# Patient Record
Sex: MALE | Race: WHITE | NOT HISPANIC OR LATINO | Employment: FULL TIME | URBAN - METROPOLITAN AREA
[De-identification: names, ages, dates, MRNs, and addresses within clinical notes are randomized per-mention and may not be internally consistent; named-entity substitution may affect disease eponyms.]

---

## 2017-01-03 ENCOUNTER — APPOINTMENT (OUTPATIENT)
Dept: URGENT CARE | Age: 42
End: 2017-01-03
Payer: COMMERCIAL

## 2017-01-03 PROCEDURE — G0382 LEV 3 HOSP TYPE B ED VISIT: HCPCS | Performed by: FAMILY MEDICINE

## 2017-01-03 PROCEDURE — 99283 EMERGENCY DEPT VISIT LOW MDM: CPT | Performed by: FAMILY MEDICINE

## 2017-03-13 ENCOUNTER — APPOINTMENT (OUTPATIENT)
Dept: LAB | Facility: HOSPITAL | Age: 42
End: 2017-03-13
Attending: INTERNAL MEDICINE
Payer: COMMERCIAL

## 2017-03-13 ENCOUNTER — TRANSCRIBE ORDERS (OUTPATIENT)
Dept: LAB | Facility: HOSPITAL | Age: 42
End: 2017-03-13

## 2017-03-13 DIAGNOSIS — C92.40 ACUTE PROMYELOCYTIC LEUKEMIA NOT HAVING ACHIEVED REMISSION (HCC): ICD-10-CM

## 2017-03-13 LAB
ALBUMIN SERPL BCP-MCNC: 3.8 G/DL (ref 3.5–5)
ALP SERPL-CCNC: 98 U/L (ref 46–116)
ALT SERPL W P-5'-P-CCNC: 42 U/L (ref 12–78)
ANION GAP SERPL CALCULATED.3IONS-SCNC: 8 MMOL/L (ref 4–13)
AST SERPL W P-5'-P-CCNC: 18 U/L (ref 5–45)
BASOPHILS # BLD AUTO: 0.02 THOUSANDS/ΜL (ref 0–0.1)
BASOPHILS NFR BLD AUTO: 0 % (ref 0–1)
BILIRUB SERPL-MCNC: 0.72 MG/DL (ref 0.2–1)
BUN SERPL-MCNC: 18 MG/DL (ref 5–25)
CALCIUM SERPL-MCNC: 8.6 MG/DL (ref 8.3–10.1)
CHLORIDE SERPL-SCNC: 105 MMOL/L (ref 100–108)
CO2 SERPL-SCNC: 27 MMOL/L (ref 21–32)
CREAT SERPL-MCNC: 0.89 MG/DL (ref 0.6–1.3)
EOSINOPHIL # BLD AUTO: 0.26 THOUSAND/ΜL (ref 0–0.61)
EOSINOPHIL NFR BLD AUTO: 4 % (ref 0–6)
ERYTHROCYTE [DISTWIDTH] IN BLOOD BY AUTOMATED COUNT: 12.3 % (ref 11.6–15.1)
GFR SERPL CREATININE-BSD FRML MDRD: >60 ML/MIN/1.73SQ M
GLUCOSE SERPL-MCNC: 108 MG/DL (ref 65–140)
HCT VFR BLD AUTO: 44.1 % (ref 36.5–49.3)
HGB BLD-MCNC: 15.3 G/DL (ref 12–17)
LYMPHOCYTES # BLD AUTO: 1.36 THOUSANDS/ΜL (ref 0.6–4.47)
LYMPHOCYTES NFR BLD AUTO: 22 % (ref 14–44)
MCH RBC QN AUTO: 31.8 PG (ref 26.8–34.3)
MCHC RBC AUTO-ENTMCNC: 34.7 G/DL (ref 31.4–37.4)
MCV RBC AUTO: 92 FL (ref 82–98)
MONOCYTES # BLD AUTO: 0.57 THOUSAND/ΜL (ref 0.17–1.22)
MONOCYTES NFR BLD AUTO: 9 % (ref 4–12)
NEUTROPHILS # BLD AUTO: 3.97 THOUSANDS/ΜL (ref 1.85–7.62)
NEUTS SEG NFR BLD AUTO: 65 % (ref 43–75)
NRBC BLD AUTO-RTO: 0 /100 WBCS
PLATELET # BLD AUTO: 216 THOUSANDS/UL (ref 149–390)
PMV BLD AUTO: 11 FL (ref 8.9–12.7)
POTASSIUM SERPL-SCNC: 3.2 MMOL/L (ref 3.5–5.3)
PROT SERPL-MCNC: 7.1 G/DL (ref 6.4–8.2)
RBC # BLD AUTO: 4.81 MILLION/UL (ref 3.88–5.62)
SODIUM SERPL-SCNC: 140 MMOL/L (ref 136–145)
WBC # BLD AUTO: 6.2 THOUSAND/UL (ref 4.31–10.16)

## 2017-03-13 PROCEDURE — 80053 COMPREHEN METABOLIC PANEL: CPT

## 2017-03-13 PROCEDURE — 85025 COMPLETE CBC W/AUTO DIFF WBC: CPT

## 2017-03-13 PROCEDURE — 81315 PML/RARALPHA COM BREAKPOINTS: CPT

## 2017-03-13 PROCEDURE — 36415 COLL VENOUS BLD VENIPUNCTURE: CPT

## 2017-03-23 ENCOUNTER — ALLSCRIPTS OFFICE VISIT (OUTPATIENT)
Dept: OTHER | Facility: OTHER | Age: 42
End: 2017-03-23

## 2017-03-24 LAB — MISCELLANEOUS LAB TEST RESULT: NORMAL

## 2017-05-17 ENCOUNTER — OFFICE VISIT (OUTPATIENT)
Dept: URGENT CARE | Age: 42
End: 2017-05-17
Payer: COMMERCIAL

## 2017-05-17 PROCEDURE — G0382 LEV 3 HOSP TYPE B ED VISIT: HCPCS | Performed by: FAMILY MEDICINE

## 2017-05-17 PROCEDURE — 99283 EMERGENCY DEPT VISIT LOW MDM: CPT | Performed by: FAMILY MEDICINE

## 2017-06-12 ENCOUNTER — TRANSCRIBE ORDERS (OUTPATIENT)
Dept: LAB | Facility: HOSPITAL | Age: 42
End: 2017-06-12

## 2017-06-12 ENCOUNTER — APPOINTMENT (OUTPATIENT)
Dept: LAB | Facility: HOSPITAL | Age: 42
End: 2017-06-12
Attending: INTERNAL MEDICINE
Payer: COMMERCIAL

## 2017-06-12 DIAGNOSIS — C92.40 ACUTE PROMYELOCYTIC LEUKEMIA NOT HAVING ACHIEVED REMISSION (HCC): Primary | ICD-10-CM

## 2017-06-12 DIAGNOSIS — C92.40 ACUTE PROMYELOCYTIC LEUKEMIA NOT HAVING ACHIEVED REMISSION (HCC): ICD-10-CM

## 2017-06-12 LAB
ALBUMIN SERPL BCP-MCNC: 3.9 G/DL (ref 3.5–5)
ALP SERPL-CCNC: 79 U/L (ref 46–116)
ALT SERPL W P-5'-P-CCNC: 38 U/L (ref 12–78)
ANION GAP SERPL CALCULATED.3IONS-SCNC: 7 MMOL/L (ref 4–13)
AST SERPL W P-5'-P-CCNC: 17 U/L (ref 5–45)
BASOPHILS # BLD AUTO: 0.03 THOUSANDS/ΜL (ref 0–0.1)
BASOPHILS NFR BLD AUTO: 0 % (ref 0–1)
BILIRUB SERPL-MCNC: 0.72 MG/DL (ref 0.2–1)
BUN SERPL-MCNC: 25 MG/DL (ref 5–25)
CALCIUM SERPL-MCNC: 8.5 MG/DL (ref 8.3–10.1)
CHLORIDE SERPL-SCNC: 105 MMOL/L (ref 100–108)
CO2 SERPL-SCNC: 31 MMOL/L (ref 21–32)
CREAT SERPL-MCNC: 0.8 MG/DL (ref 0.6–1.3)
EOSINOPHIL # BLD AUTO: 0.51 THOUSAND/ΜL (ref 0–0.61)
EOSINOPHIL NFR BLD AUTO: 8 % (ref 0–6)
ERYTHROCYTE [DISTWIDTH] IN BLOOD BY AUTOMATED COUNT: 12.5 % (ref 11.6–15.1)
GFR SERPL CREATININE-BSD FRML MDRD: >60 ML/MIN/1.73SQ M
GLUCOSE P FAST SERPL-MCNC: 101 MG/DL (ref 65–99)
HCT VFR BLD AUTO: 46.4 % (ref 36.5–49.3)
HGB BLD-MCNC: 15.7 G/DL (ref 12–17)
LYMPHOCYTES # BLD AUTO: 1.31 THOUSANDS/ΜL (ref 0.6–4.47)
LYMPHOCYTES NFR BLD AUTO: 19 % (ref 14–44)
MCH RBC QN AUTO: 31.4 PG (ref 26.8–34.3)
MCHC RBC AUTO-ENTMCNC: 33.8 G/DL (ref 31.4–37.4)
MCV RBC AUTO: 93 FL (ref 82–98)
MONOCYTES # BLD AUTO: 0.54 THOUSAND/ΜL (ref 0.17–1.22)
MONOCYTES NFR BLD AUTO: 8 % (ref 4–12)
NEUTROPHILS # BLD AUTO: 4.44 THOUSANDS/ΜL (ref 1.85–7.62)
NEUTS SEG NFR BLD AUTO: 65 % (ref 43–75)
NRBC BLD AUTO-RTO: 0 /100 WBCS
PLATELET # BLD AUTO: 194 THOUSANDS/UL (ref 149–390)
PMV BLD AUTO: 10.8 FL (ref 8.9–12.7)
POTASSIUM SERPL-SCNC: 3.5 MMOL/L (ref 3.5–5.3)
PROT SERPL-MCNC: 7.3 G/DL (ref 6.4–8.2)
RBC # BLD AUTO: 5 MILLION/UL (ref 3.88–5.62)
SODIUM SERPL-SCNC: 143 MMOL/L (ref 136–145)
WBC # BLD AUTO: 6.84 THOUSAND/UL (ref 4.31–10.16)

## 2017-06-12 PROCEDURE — 85025 COMPLETE CBC W/AUTO DIFF WBC: CPT

## 2017-06-12 PROCEDURE — 80053 COMPREHEN METABOLIC PANEL: CPT

## 2017-06-12 PROCEDURE — 88374 M/PHMTRC ALYS ISHQUANT/SEMIQ: CPT | Performed by: INTERNAL MEDICINE

## 2017-06-12 PROCEDURE — 36415 COLL VENOUS BLD VENIPUNCTURE: CPT

## 2017-06-12 PROCEDURE — 88374 M/PHMTRC ALYS ISHQUANT/SEMIQ: CPT

## 2017-06-15 LAB — MISCELLANEOUS LAB TEST RESULT: NORMAL

## 2017-06-16 DIAGNOSIS — C92.40 ACUTE PROMYELOCYTIC LEUKEMIA NOT HAVING ACHIEVED REMISSION (HCC): ICD-10-CM

## 2017-06-19 LAB — MISCELLANEOUS LAB TEST RESULT: NORMAL

## 2017-06-22 ENCOUNTER — ALLSCRIPTS OFFICE VISIT (OUTPATIENT)
Dept: OTHER | Facility: OTHER | Age: 42
End: 2017-06-22

## 2017-08-03 ENCOUNTER — OFFICE VISIT (OUTPATIENT)
Dept: URGENT CARE | Age: 42
End: 2017-08-03
Payer: COMMERCIAL

## 2017-08-03 PROCEDURE — G0382 LEV 3 HOSP TYPE B ED VISIT: HCPCS | Performed by: FAMILY MEDICINE

## 2017-09-08 ENCOUNTER — GENERIC CONVERSION - ENCOUNTER (OUTPATIENT)
Dept: OTHER | Facility: OTHER | Age: 42
End: 2017-09-08

## 2017-11-16 ENCOUNTER — ALLSCRIPTS OFFICE VISIT (OUTPATIENT)
Dept: OTHER | Facility: OTHER | Age: 42
End: 2017-11-16

## 2017-11-16 DIAGNOSIS — I10 ESSENTIAL (PRIMARY) HYPERTENSION: ICD-10-CM

## 2017-11-17 NOTE — PROGRESS NOTES
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)    Plan  Benign essential hypertension    · Losartan Potassium 50 MG Oral Tablet; take 1 tab daily   · HydroCHLOROthiazide 12 5 MG Oral Tablet; TAKE 1 TABLET DAILY   · (1) COMPREHENSIVE METABOLIC PANEL; Status:Active; Requested for:16Nov2017;    · (1) LIPID PANEL, FASTING; Status:Active; Requested for:16Nov2017;    · (1) TSH WITH FT4 REFLEX; Status:Active; Requested for:16Nov2017;    · EKG/ECG- POC; Status:Complete;   Done: 92APL1946 07:28PM   · Follow-up visit in 2 weeks Evaluation and Treatment  Follow-up  Status: Hold For -Scheduling  Requested for: 89JCL9991   · Begin or continue regular aerobic exercise  Gradually work up to at least 3 sessions of30 minutes of exercise a week ; Status:Complete;   Done: 71VRT1907 07:41PM   · Eat a low fat and low cholesterol diet ; Status:Complete;   Done: 14EKY8569 07:41PM   · Restrict the salt in your diet by avoiding highly salted foods ; Status:Complete;   Done:16Nov2017 07:41PM    Discussion/Summary    1  HTN - suboptimal BP control  check EKG  Losartan 50 mg one tablet daily  Continue HCTZ 12 5 mg one tablet daily  low-sodium diet, regular exercise Check labs  Acute promyelocytic leukemia - management per hematology -oncology Dr Norris Barrios  followup visit in 2 weeks, will review blood test results  The patient was counseled regarding diagnostic results,-- instructions for management,-- risk factor reductions,-- risks and benefits of treatment options,-- importance of compliance with treatment  total time of encounter was 25 minutes-- and-- 15 minutes was spent counseling  Possible side effects of new medications were reviewed with the patient/guardian today  The treatment plan was reviewed with the patient/guardian   The patient/guardian understands and agrees with the treatment plan      Chief Complaint  Patient presents to discuss blood pressure medication  Patient is here today for follow up of chronic conditions described in HPI  History of Present Illness  Patient presents to the office to discuss BP medications  seen in the office in 10/15  has H/o Hypertension  Currently taking HCTZ 12 5 mg daily  was at the dentist's office on Monday, 11/13/17 and his blood pressure was very high  He can not provide exact numbers  denies chest pain, shortness of breath, dizziness  Denies tobacco use  was diagnosed with acute promyelocytic leukemia in June 2015 has been followed by oncology - hematology Dr Bishnu Toscano every 6 months, last seen in 6/17  H/o is positive for HTN  Review of Systems   Constitutional: no fever,-- no chills-- and-- not feeling tired  Gained 10 lb since 5/17  Eyes: no eye pain,-- no dryness of the eyes,-- eyes not red,-- no purulent discharge from the eyes-- and-- no itching of the eyes  No visual disturbances  ENT: no earache,-- no nosebleeds,-- no sore throat,-- no hearing loss,-- no nasal discharge-- and-- no hoarseness  Cardiovascular: no chest pain,-- no intermittent leg claudication,-- no palpitations-- and-- no extremity edema  Respiratory: no shortness of breath,-- no cough,-- no wheezing-- and-- no shortness of breath during exertion  Gastrointestinal: no abdominal pain,-- no nausea,-- no vomiting,-- no constipation,-- no diarrhea-- and-- no blood in stools  Genitourinary: no dysuria,-- no urinary hesitancy-- and-- no nocturia  Musculoskeletal: no arthralgias,-- no joint swelling-- and-- no myalgias  Integumentary: no rashes,-- no itching-- and-- no skin wound  Neurological: no headache,-- no numbness,-- no tingling,-- no dizziness-- and-- no fainting  Psychiatric: no anxiety,-- no sleep disturbances,-- no depression-- and-- no emotional problems  Endocrine: no muscle weakness  Hematologic/Lymphatic: a tendency for easy bruising, but-- no swollen glands-- and-- no tendency for easy bleeding        Active Problems  1  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)   2  Allergic rhinitis (477 9) (J30 9)   3  Benign essential hypertension (401 1) (I10)   4  Dermatitis due to drug (693 0) (L27 0)   5  Need for influenza vaccination (V04 81) (Z23)   6  Nodule of left lung (793 11) (R91 1)   7  Seasonal allergies (477 9) (J30 2)    Past Medical History  1  History of Acute upper respiratory infection (465 9) (J06 9)   2  History of acute bronchitis (V12 69) (Z87 09)   3  History of acute bronchitis (V12 69) (Z87 09)   4  History of acute sinusitis (V12 69) (Z87 09)   5  History of blurred vision (V12 49) (Z86 69)   6  History of hypokalemia (V12 29) (Z86 39)   7  History of Skin abrasion (919 0) (T14 8XXA)    The active problems and past medical history were reviewed and updated today  Surgical History  1  History of Oral Surgery Tooth Extraction [place] Tooth    The surgical history was reviewed and updated today  Family History  Father    1  Family history of hypertension (V17 49) (Z82 49)    The family history was reviewed and updated today  Social History     · Denied: History of Drug use   · Never A Smoker   · No alcohol use   · Social alcohol use (Z78 9)  The social history was reviewed and updated today  Current Meds   1  HydroCHLOROthiazide 12 5 MG Oral Tablet; TAKE 1 TABLET DAILY; Therapy: 82KTK8054 to (Evaluate:15Mar2018)  Requested for: 17Aug2017; Last Rx:17Aug2017 Ordered    The medication list was reviewed and updated today  Allergies  1   Penicillins    Vitals  Vital Signs    Recorded: 16YHJ1659 07:36PM Recorded: 40KJG3424 06:48PM   Temperature  98 3 F, Tympanic   Heart Rate 78 88, R Radial   Pulse Quality  Normal, R Radial   Respiration Quality  Normal   Respiration  16   Systolic 952 919, LUE, Sitting   Diastolic 98 90, LUE, Sitting   Height  5 ft 9 in   Weight  212 lb 8 oz   BMI Calculated  31 38   BSA Calculated  2 12   Pain Scale  0       Physical Exam   Constitutional General appearance: No acute distress, well appearing and well nourished  -- Mildly obese  Eyes  Conjunctiva and lids: No swelling, erythema, or discharge  Pupils and irises: Equal, round and reactive to light  Ears, Nose, Mouth, and Throat  External inspection of ears and nose: Normal    Otoscopic examination: Tympanic membrance translucent with normal light reflex  Canals patent without erythema  Oropharynx: Normal with no erythema, edema, exudate or lesions  Pulmonary  Respiratory effort: No increased work of breathing or signs of respiratory distress  Auscultation of lungs: Clear to auscultation, equal breath sounds bilaterally, no wheezes, no rales, no rhonci  Cardiovascular  Auscultation of heart: Normal rate and rhythm, normal S1 and S2, without murmurs  Examination of extremities for edema and/or varicosities: Normal    Carotid pulses: Normal  -- no carotid bruits  Abdomen  Abdomen: Non-tender, no masses  Liver and spleen: No hepatomegaly or splenomegaly  Musculoskeletal  Gait and station: Normal    Digits and nails: Normal without clubbing or cyanosis  Inspection/palpation of joints, bones, and muscles: Normal    Skin  Skin and subcutaneous tissue: Normal without rashes or lesions     Psychiatric  Mood and affect: Normal          Future Appointments    Date/Time Provider Specialty Site   12/18/2017 04:00 PM SANAZ Hall , DO, [facility] Oncology [address] [place] [address] Rd       Signatures   Electronically signed by : SANAZ Zuleta ; Nov 16 2017  7:46PM EST                       (Author)

## 2017-12-06 ENCOUNTER — APPOINTMENT (OUTPATIENT)
Dept: LAB | Facility: HOSPITAL | Age: 42
End: 2017-12-06
Attending: INTERNAL MEDICINE
Payer: COMMERCIAL

## 2017-12-06 ENCOUNTER — TRANSCRIBE ORDERS (OUTPATIENT)
Dept: LAB | Facility: HOSPITAL | Age: 42
End: 2017-12-06

## 2017-12-06 DIAGNOSIS — C92.40 ACUTE PROMYELOCYTIC LEUKEMIA NOT HAVING ACHIEVED REMISSION (HCC): ICD-10-CM

## 2017-12-06 DIAGNOSIS — I10 ESSENTIAL (PRIMARY) HYPERTENSION: ICD-10-CM

## 2017-12-06 LAB
ALBUMIN SERPL BCP-MCNC: 3.7 G/DL (ref 3.5–5)
ALP SERPL-CCNC: 81 U/L (ref 46–116)
ALT SERPL W P-5'-P-CCNC: 49 U/L (ref 12–78)
ANION GAP SERPL CALCULATED.3IONS-SCNC: 6 MMOL/L (ref 4–13)
AST SERPL W P-5'-P-CCNC: 24 U/L (ref 5–45)
BASOPHILS # BLD AUTO: 0.02 THOUSANDS/ΜL (ref 0–0.1)
BASOPHILS NFR BLD AUTO: 0 % (ref 0–1)
BILIRUB SERPL-MCNC: 0.57 MG/DL (ref 0.2–1)
BUN SERPL-MCNC: 18 MG/DL (ref 5–25)
CALCIUM SERPL-MCNC: 8.3 MG/DL (ref 8.3–10.1)
CHLORIDE SERPL-SCNC: 104 MMOL/L (ref 100–108)
CHOLEST SERPL-MCNC: 112 MG/DL (ref 50–200)
CO2 SERPL-SCNC: 31 MMOL/L (ref 21–32)
CREAT SERPL-MCNC: 0.88 MG/DL (ref 0.6–1.3)
EOSINOPHIL # BLD AUTO: 0.27 THOUSAND/ΜL (ref 0–0.61)
EOSINOPHIL NFR BLD AUTO: 5 % (ref 0–6)
ERYTHROCYTE [DISTWIDTH] IN BLOOD BY AUTOMATED COUNT: 12.2 % (ref 11.6–15.1)
GFR SERPL CREATININE-BSD FRML MDRD: 105 ML/MIN/1.73SQ M
GLUCOSE P FAST SERPL-MCNC: 97 MG/DL (ref 65–99)
HCT VFR BLD AUTO: 44.7 % (ref 36.5–49.3)
HDLC SERPL-MCNC: 34 MG/DL (ref 40–60)
HGB BLD-MCNC: 15.8 G/DL (ref 12–17)
LDLC SERPL CALC-MCNC: 59 MG/DL (ref 0–100)
LYMPHOCYTES # BLD AUTO: 1.52 THOUSANDS/ΜL (ref 0.6–4.47)
LYMPHOCYTES NFR BLD AUTO: 28 % (ref 14–44)
MCH RBC QN AUTO: 32.2 PG (ref 26.8–34.3)
MCHC RBC AUTO-ENTMCNC: 35.3 G/DL (ref 31.4–37.4)
MCV RBC AUTO: 91 FL (ref 82–98)
MONOCYTES # BLD AUTO: 0.45 THOUSAND/ΜL (ref 0.17–1.22)
MONOCYTES NFR BLD AUTO: 8 % (ref 4–12)
NEUTROPHILS # BLD AUTO: 3.22 THOUSANDS/ΜL (ref 1.85–7.62)
NEUTS SEG NFR BLD AUTO: 59 % (ref 43–75)
NRBC BLD AUTO-RTO: 0 /100 WBCS
PLATELET # BLD AUTO: 217 THOUSANDS/UL (ref 149–390)
PMV BLD AUTO: 11.1 FL (ref 8.9–12.7)
POTASSIUM SERPL-SCNC: 3.2 MMOL/L (ref 3.5–5.3)
PROT SERPL-MCNC: 7.1 G/DL (ref 6.4–8.2)
RBC # BLD AUTO: 4.9 MILLION/UL (ref 3.88–5.62)
SODIUM SERPL-SCNC: 141 MMOL/L (ref 136–145)
T4 FREE SERPL-MCNC: 0.99 NG/DL (ref 0.76–1.46)
TRIGL SERPL-MCNC: 95 MG/DL
TSH SERPL DL<=0.05 MIU/L-ACNC: 3.83 UIU/ML (ref 0.36–3.74)
WBC # BLD AUTO: 5.49 THOUSAND/UL (ref 4.31–10.16)

## 2017-12-06 PROCEDURE — 84443 ASSAY THYROID STIM HORMONE: CPT

## 2017-12-06 PROCEDURE — 36415 COLL VENOUS BLD VENIPUNCTURE: CPT

## 2017-12-06 PROCEDURE — 80053 COMPREHEN METABOLIC PANEL: CPT

## 2017-12-06 PROCEDURE — 84439 ASSAY OF FREE THYROXINE: CPT

## 2017-12-06 PROCEDURE — 80061 LIPID PANEL: CPT

## 2017-12-06 PROCEDURE — 85025 COMPLETE CBC W/AUTO DIFF WBC: CPT

## 2017-12-06 PROCEDURE — 81315 PML/RARALPHA COM BREAKPOINTS: CPT

## 2017-12-08 ENCOUNTER — GENERIC CONVERSION - ENCOUNTER (OUTPATIENT)
Dept: OTHER | Facility: OTHER | Age: 42
End: 2017-12-08

## 2017-12-15 DIAGNOSIS — C92.40 ACUTE PROMYELOCYTIC LEUKEMIA NOT HAVING ACHIEVED REMISSION (HCC): ICD-10-CM

## 2017-12-15 DIAGNOSIS — E87.6 HYPOKALEMIA: ICD-10-CM

## 2017-12-18 ENCOUNTER — ALLSCRIPTS OFFICE VISIT (OUTPATIENT)
Dept: OTHER | Facility: OTHER | Age: 42
End: 2017-12-18

## 2017-12-19 NOTE — PROGRESS NOTES
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)    Plan  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NAKUL M3)    · Drink plenty of fluids ; Status:Complete;   Done: 32XWW0540   Ordered; For: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NAKUL M3); Ordered By:Belman, Claudetta Rhodes;   · Follow-up visit in 6 months Evaluation and Treatment  Follow-up  Status: Hold For -Scheduling  Requested for: 71YEZ0968   Ordered; For: 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NAKUL M3); Ordered By: Geoffrey Silva Performed:  Due: 41DQS8069   · (1) CBC/PLT/DIFF; Status:Active; Requested [id number]; Perform:[facility]; ZLN[facility]29SUX8002;[facility]; For: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NAKUL M3); Ordered By:Belman, Claudetta Rhodes;   · (1) COMPREHENSIVE METABOLIC PANEL; Status:Active; Requested [id number]; Perform:[facility]; YMX:78IWN5367;UBGDXTY; For: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NAKUL M3); Ordered By:Belman, Claudetta Rhodes;   · (1) SPECIAL TEST; Status:Active; Requested AEE:16PHU9531; Perform:[facility]; Order Comments:PCR for PML/ELIJAH; JTX:95NTU1596;NIYZFMG; For: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NAKUL M3); Ordered By:Belman, Claudetta Rhodes;    Discussion/Summary  Discussion Summary:   In summary, this is a 41-year-old male history of acute promyelocytic leukemia as outlined  He remains in remission  CBC is normal  Chemistry likewise  Minor hypokalemia is noted, possibly attributable to diuretic  High potassium foods were suggested  He will continue under observation  Follow-up in 6 months with repeat blood work  Lastly, I note that he has had an intermittent nonproductive cough  He denies any chest pain  He does have a history of allergies in his younger years  I suspect this represents asthma and asked him to review with his PCP at their next visit  Counseling Documentation With Imm: The patient was counseled regarding diagnostic results,-- instructions for management,-- patient and family education,-- impressions  total time of encounter was 15 minutes        Chief Complaint  Chief Complaint Free Text Note Form: Follow-up regarding APL  History of Present Illness  HPI: 6/9/15- patient presented to the hospital with bruising going on for a week or more  White blood cell count 51 9, hemoglobin 11 7, platelet count 17, 73% blasts  Fibrinogen 73, INR 1 96, PTT 31  Flow cytometry was consistent with a diagnosis of acute promyelocytic leukemia  Patient was started on ATRA 45 mg per meter squared in divided doses  Idarubicin was started 6/16/15 and administered 12 mg per meter squared days +2, +4, +6, pulse 8  On day #9 arsenic trioxide 0 15 mg per kilogram was initiated and continued daily  Treatment was complicated by stomatitis, pulmonary infiltrate (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 syndrome)  He was treated with a variety of antibiotics including acyclovir Mycafungin, cefuroxime  He was treated with allopurinol and potassium  8/24/15- started ATRA 2 weeks on, 2 weeks off, Trisenox 4 weeks on, 4 weeks off  Previous Therapy:   8/24/15- started ATRA 2 weeks on, 2 weeks off, Trisenox 4 weeks on, 2 weeks off  Interval History: Fatigued at the end of the day but still able to work  Review of Systems  Complete-Male:  Constitutional: not feeling poorly-- and-- not feeling tired  Eyes: No complaints of eye pain, no red eyes, no discharge from eyes, no itchy eyes  ENT: no complaints of earache, no hearing loss, no nosebleeds, no nasal discharge, no sore throat, no hoarseness  Cardiovascular: No complaints of slow heart rate, no fast heart rate, no chest pain, no palpitations, no leg claudication, no lower extremity  Respiratory: shortness of breath during exertion  Gastrointestinal: no nausea  Genitourinary: No complaints of dysuria, no incontinence, no hesitancy, no nocturia, no genital lesion, no testicular pain  Musculoskeletal: No complaints of arthralgia, no myalgias, no joint swelling or stiffness, no limb pain or swelling    Integumentary: No complaints of skin rash or skin lesions, no itching, no skin wound, no dry skin  Neurological: No compliants of headache, no confusion, no convulsions, no numbness or tingling, no dizziness or fainting, no limb weakness, no difficulty walking  Psychiatric: no depression  Endocrine: No complaints of proptosis, no hot flashes, no muscle weakness, no erectile dysfunction, no deepening of the voice, no feelings of weakness  Hematologic/Lymphatic: No complaints of swollen glands, no swollen glands in the neck, does not bleed easily, no easy bruising  Active Problems  1  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)   2  Allergic rhinitis (477 9) (J30 9)   3  Benign essential hypertension (401 1) (I10)   4  Dermatitis due to drug (693 0) (L27 0)   5  Hypokalemia (276 8) (E87 6)   6  Need for influenza vaccination (V04 81) (Z23)   7  Nodule of left lung (793 11) (R91 1)   8  Seasonal allergies (477 9) (J30 2)    Past Medical History  1  History of Acute upper respiratory infection (465 9) (J06 9)   2  History of acute bronchitis (V12 69) (Z87 09)   3  History of acute bronchitis (V12 69) (Z87 09)   4  History of acute sinusitis (V12 69) (Z87 09)   5  History of blurred vision (V12 49) (Z86 69)   6  History of Skin abrasion (919 0) (T14 8XXA)    Surgical History  1  History of Oral Surgery Tooth Extraction Silva Tooth    Family History  Father    1  Family history of hypertension (V17 49) (Z82 49)    Social History   · Denied: History of Drug use   · Never A Smoker   · No alcohol use   · Social alcohol use (Z78 9)    Current Meds   1  HydroCHLOROthiazide 12 5 MG Oral Tablet; TAKE 1 TABLET DAILY; Therapy: 67AZO9427 to (Evaluate:15Mar2018)  Requested for: 34ELY8859; Last Rx:17Aug2017 Ordered   2  Losartan Potassium 50 MG Oral Tablet; take 1 tab daily; Therapy: 22RDG0481 to (Last Rx:16Nov2017)  Requested for: 20CNB7614 Ordered  Medication List Reviewed: The medication list was reviewed and updated today  Allergies  1  Penicillins    Vitals  Vital Signs    Recorded: 62BDG2752 04:32PM   Temperature 97 9 F   Heart Rate 101   Respiration 16   Systolic 805   Diastolic 74   Height 5 ft 9 in   Weight 211 lb    BMI Calculated 31 16   BSA Calculated 2 11   O2 Saturation 98   Pain Scale 0     Physical Exam   Constitutional  General appearance: No acute distress, well appearing and well nourished  Eyes  Conjunctiva and lids: No swelling, erythema, or discharge  Ears, Nose, Mouth, and Throat  External inspection of ears and nose: Normal    Oropharynx: Normal with no erythema, edema, exudate or lesions  Pulmonary  Auscultation of lungs: Clear to auscultation, equal breath sounds bilaterally, no wheezes, no rales, no rhonci  Cardiovascular  Auscultation of heart: Normal rate and rhythm, normal S1 and S2, without murmurs  Examination of extremities for edema and/or varicosities: Normal    Abdomen  Abdomen: Non-tender, no masses  Liver and spleen: No hepatomegaly or splenomegaly  Lymphatic  Palpation of lymph nodes in neck: No lymphadenopathy  Musculoskeletal  Gait and station: Normal    Skin  Skin and subcutaneous tissue: Normal without rashes or lesions  Neurologic  Cranial nerves: Cranial nerves 2-12 intact  Psychiatric  Orientation to person, place and time: Normal          Signatures   Electronically signed by : SANAZ Preciado  Dec 18 2017  5:07PM EST                       (Author)

## 2018-01-10 NOTE — MISCELLANEOUS
Message   Recorded as Task   Date: 07/05/2016 01:00 PM, Created By: Rashad Talbot   Task Name: Call Back   Assigned To: Lydia Kimbrough   Regarding Patient: Dahlia Morel, Status: In Progress   Comment:    Aleisha Hawk - 05 Jul 2016 1:00 PM     TASK CREATED  Caller: Self; Other; [phone] (Home); [phone] x,,,,, (Work)  pt is calling back to have nurse call his insurance [phone]-to change diagnosis code for labs run on 02/01/16---they are not paying-debating whether it is genectic   Iesha Cordero - 05 Jul 2016 1:37 PM     TASK EDITED  called [employer]  Labs from 2/1/16 denied  DX provided does not cover genetic testing  Supporting documentation to appeal, medical records/office visit notes need to be sent via mail to [address]  Reference claim #[id number]  There is no appeal form to complete and this cannot be done via fax  Iesha Cordero - 05 Jul 2016 2:05 PM     TASK IN PROGRESS   Iesha Cordero - 08 Jul 2016 8:43 AM     TASK REASSIGNED: Previously Assigned To Iesha Cordero        Active Problems    1  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)   2  Allergic rhinitis (477 9) (J30 9)   3  Benign essential hypertension (401 1) (I10)   4  Benign essential hypertension (401 1) (I10)   5  Blurred vision (368 8) (H53 8)   6  Dermatitis due to drug (693 0,E980 5) (L27 0)   7  Need for influenza vaccination (V04 81) (Z23)   8  Skin abrasion (919 0) (T14 8)    Current Meds   1  Hydrochlorothiazide 12 5 MG Oral Tablet; TAKE 1 TABLET DAILY; Therapy: 34ICC0218 to (Evaluate:51Miz4828)  Requested for: 56MYS2723; Last   Rx:79Nzl1755 Ordered    Allergies    1   Penicillins    Signatures   Electronically signed by : Rosemarie Smith, ; Jul 11 2016  9:28AM EST                       (Author)

## 2018-01-10 NOTE — MISCELLANEOUS
Message   Recorded as Task   Date: 03/24/2016 09:10 AM, Created By: Mckay Croft   Task Name: Call Back   Assigned To: Dinah Sanon   Regarding Patient: Shaan Morin, Status: Active   Comment:    Theresa Goodrich - 24 Mar 2016 9:10 AM     TASK CREATED  Caller: Self; Results Inquiry; [phone] (Home); [phone] x,,,,, (Work)  Pt called to get results  He said that he is having a port removal tomorrow and they need to know his BW results  Lydia Kimbrough - 24 Mar 2016 9:22 AM     TASK REASSIGNED: Previously Assigned To Breckenridge Fredi - 24 Mar 2016 9:42 AM     TASK EDITED  reviewed bone marrow bx results with Dr Radha Swift  pt is ok to have port removed tomorrow as scheduled with IR  Patient aware  Active Problems    1  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)   2  Allergic rhinitis (477 9) (J30 9)   3  Benign essential hypertension (401 1) (I10)   4  Benign essential hypertension (401 1) (I10)   5  Blurred vision (368 8) (H53 8)   6  Dermatitis due to drug (693 0,E980 5) (L27 0)   7  Need for influenza vaccination (V04 81) (Z23)   8  Skin abrasion (919 0) (T14 8)    Current Meds   1  Hydrochlorothiazide 12 5 MG Oral Tablet; TAKE 1 TABLET DAILY; Therapy: 50WCG6078 to (Evaluate:37Swq8459)  Requested for: 18AQL5848; Last   Rx:56Lpn1277 Ordered   2  Mupirocin 2 % External Ointment; APPLY A SMALL AMOUNT 3 TIMES A DAY AS   DIRECTED; Therapy: 07ZLZ2053 to (Evaluate:96Pgd0616); Last Rx:19Suy9226 Ordered    Allergies    1   Penicillins    Signatures   Electronically signed by : Naveen Irvin RN; Mar 24 2016  9:43AM EST                       (Author)

## 2018-01-11 NOTE — MISCELLANEOUS
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)   2  Allergic rhinitis (477 9) (J30 9)   3  Benign essential hypertension (401 1) (I10)   4  Benign essential hypertension (401 1) (I10)   5  Blurred vision (368 8) (H53 8)   6  Dermatitis due to drug (693 0,E980 5) (L27 0)   7  Need for influenza vaccination (V04 81) (Z23)   8  Nodule of left lung (793 11) (R91 1)   9  Skin abrasion (919 0) (T14 8)    Current Meds   1  HydroCHLOROthiazide 12 5 MG Oral Tablet; TAKE 1 TABLET DAILY; Therapy: 70TUY2237 to (Evaluate:62Osd9153)  Requested for: 27Oct2016; Last   Rx:27Oct2016 Ordered    Allergies    1   Penicillins    Signatures   Electronically signed by : Rosemarie Smith, ; Nov 1 2016 11:20AM EST                       (Author)

## 2018-01-12 NOTE — PROGRESS NOTES
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)    Plan  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NAKUL M3)    · Drink plenty of fluids ; Status:Complete;   Done: 56TJW5093   Ordered; For: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NAKUL M3); Ordered By:Janel Jj;   · *1- [facility] Physician Referral  Consult  Bone marrow biopsy  Port removal one week later  Status: Hold For - Scheduling  Requested for: 84SQR8296   Ordered; For: 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NAKUL M3); Ordered By: Elizabeth Tilley Performed:  Due: 04CUI9702  Care Summary provided  : Yes   · (1) CBC/PLT/DIFF; Status:Active; Requested DFN:32IPZ6046;    Perform:[facility]; QCB:17CTP5209;GQWMBSB; For: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NAKUL M3); Ordered By:Janel Jj;   · (1) COMPREHENSIVE METABOLIC PANEL; Status:Active; Requested CGS:53EHK3694;    Perform:[facility]; MIKEY:11PNI4605;UPBSUDD; For: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NAKUL M3); Ordered By:Janel Jj;   · (1) SPECIAL TEST; Status:Active; Requested YZA:60RVQ1874;    Perform:[facility]; ZNN:94KNQ5225;OCHHAUZ; For: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NAKUL M3); Ordered By:Janel Jj;  test do you want? : PML/ELIJAH by PCR   · Follow-up visit in 3 months Evaluation and Treatment  Follow-up  Status: Complete   Done: 54VYT3378 03:45PM   Ordered; For: 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NAKUL M3); Ordered By: Elizabeth Tilley Performed:  Due: 86QRC4135; Last Updated By: Kassandra Torrez; 3/10/2016 4:19:47 PM    Discussion/Summary  Discussion Summary:   In summary, this is a 60-year-old male history of acute promyelocytic leukemia as outlined  Overall, he is doing well  He has completed his maintenance chemotherapy program of ATRA and Trisenox  Stomatitis and dermatitis are gradually improving  His energy level is generally good  He is able to work although he does have some fatigue at the end of the day  He has no other signs referable to his disease or treatment   He has no bleeding symptoms  We reviewed that bone marrow biopsy is warranted to look for minimum, residual disease  He requests Port-A-Cath removal  A week later, presuming this test is negative  I outlined a plan of surveillance moving forward thereafter with blood work on a every 3 month basis for one year, q  4 months for 2 years, q  6 months for 2 years  We reviewed that it is quite likely that his bone marrow will be favorable  Continue  Followup is warranted  Nonetheless  I note, that he's had persistent mild elevation of his alkaline phosphatase  I suspect this is related to background fatty liver/metabolic changes  If this is persistently abnormal hepatic imaging might be considered (ultrasound)  I reviewed the above considerations with the patient and his wife  They voiced understanding and agreement  Understands and agrees with treatment plan: The treatment plan was reviewed with the patient/guardian  The patient/guardian understands and agrees with the treatment plan   Counseling Documentation With Imm: The patient was counseled regarding diagnostic results, instructions for management, patient and family education, impressions  total time of encounter was 25 minutes  Chief Complaint  Chief Complaint Free Text Note Form: Followup regarding leukemia  History of Present Illness  HPI: 6/9/15- patient presented to the hospital with bruising going on for a week or more  White blood cell count 51 9, hemoglobin 11 7, platelet count 17, 50% blasts  Fibrinogen 73, INR 1 96, PTT 31  Flow cytometry was consistent with a diagnosis of acute promyelocytic leukemia  Patient was started on ATRA 45 mg per meter squared in divided doses  Idarubicin was started 6/16/15 and administered 12 mg per meter squared days +2, +4, +6, pulse 8  On day #9 arsenic trioxide 0 15 mg per kilogram was initiated and continued daily  Treatment was complicated by stomatitis, pulmonary infiltrate (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 syndrome)  He was treated with a variety of antibiotics including acyclovir Mycafungin, cefuroxime  He was treated with allopurinol and potassium  8/24/15- started ATRA 2 weeks on, 2 weeks off, Trisenox 4 weeks on, 4 weeks off  Previous Therapy:   8/24/15- started ATRA 2 weeks on, 2 weeks off, Trisenox 4 weeks on, 2 weeks off  Interval History: Fatigued at the end of the day but still able to work  Review of Systems  Complete-Male:   Constitutional: not feeling poorly and not feeling tired  Eyes: No complaints of eye pain, no red eyes, no discharge from eyes, no itchy eyes  ENT: no complaints of earache, no hearing loss, no nosebleeds, no nasal discharge, no sore throat, no hoarseness  Cardiovascular: No complaints of slow heart rate, no fast heart rate, no chest pain, no palpitations, no leg claudication, no lower extremity  Respiratory: shortness of breath during exertion  Gastrointestinal: no nausea  Genitourinary: No complaints of dysuria, no incontinence, no hesitancy, no nocturia, no genital lesion, no testicular pain  Musculoskeletal: No complaints of arthralgia, no myalgias, no joint swelling or stiffness, no limb pain or swelling  Integumentary: No complaints of skin rash or skin lesions, no itching, no skin wound, no dry skin  Neurological: No compliants of headache, no confusion, no convulsions, no numbness or tingling, no dizziness or fainting, no limb weakness, no difficulty walking  Psychiatric: no depression  Endocrine: No complaints of proptosis, no hot flashes, no muscle weakness, no erectile dysfunction, no deepening of the voice, no feelings of weakness  Hematologic/Lymphatic: No complaints of swollen glands, no swollen glands in the neck, does not bleed easily, no easy bruising  Active Problems    1  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)   2  Allergic rhinitis (477 9) (J30 9)   3  Benign essential hypertension (401 1) (I10)   4   Benign essential hypertension (401 1) (I10)   5  Blurred vision (368 8) (H53 8)   6  Dermatitis due to drug (693 0,E980 5) (L27 0)   7  Need for influenza vaccination (V04 81) (Z23)   8  Skin abrasion (919 0) (T14 8)    Past Medical History    1  History of Acute upper respiratory infection (465 9) (J06 9)   2  History of acute bronchitis (V12 69) (Z87 09)   3  History of hypokalemia (V12 29) (Z86 39)    Family History    1  Family history of hypertension (V17 49) (Z82 49)    Social History    · Never A Smoker   · No alcohol use    Current Meds   1  Hydrochlorothiazide 12 5 MG Oral Tablet; TAKE 1 TABLET DAILY; Therapy: 89RTP3375 to (Evaluate:63Rkt2792)  Requested for: 99HNS7649; Last   Rx:41Hhe8545 Ordered   2  Mupirocin 2 % External Ointment; APPLY A SMALL AMOUNT 3 TIMES A DAY AS   DIRECTED; Therapy: 74WWP4945 to (Evaluate:90Tac0099); Last Rx:95Opn8223 Ordered  Medication List Reviewed: The medication list was reviewed and updated today  Allergies    1  Penicillins    Vitals  Vital Signs [Data Includes: Current Encounter]    Recorded: 48FCI2125 03:51PM   Temperature 98 7 F   Heart Rate 94   Respiration 16   Systolic 547   Diastolic 82   Height 5 ft 9 5 in   Weight 206 lb 2 08 oz   BMI Calculated 30   BSA Calculated 2 1   O2 Saturation 96   Pain Scale 0     Physical Exam    Constitutional   General appearance: No acute distress, well appearing and well nourished  Eyes   Conjunctiva and lids: No swelling, erythema, or discharge  Ears, Nose, Mouth, and Throat   External inspection of ears and nose: Normal     Oropharynx: Normal with no erythema, edema, exudate or lesions  Pulmonary   Auscultation of lungs: Clear to auscultation, equal breath sounds bilaterally, no wheezes, no rales, no rhonci  Cardiovascular   Auscultation of heart: Normal rate and rhythm, normal S1 and S2, without murmurs  Examination of extremities for edema and/or varicosities: Normal     Abdomen   Abdomen: Non-tender, no masses      Liver and spleen: No hepatomegaly or splenomegaly  Lymphatic   Palpation of lymph nodes in neck: No lymphadenopathy  Musculoskeletal   Gait and station: Normal     Skin   Skin and subcutaneous tissue: Normal without rashes or lesions  Neurologic   Cranial nerves: Cranial nerves 2-12 intact  Psychiatric   Orientation to person, place and time: Normal          Results/Data  Results   (1) CBC/PLT/DIFF 01KTQ8030 02:38PM Damon Shields     Test Name Result Flag Reference   WBC COUNT 4 95 Thousand/uL  4 31-10 16   RBC COUNT 4 68 Million/uL  3 88-5 62   HEMOGLOBIN 15 0 g/dL  12 0-17 0   HEMATOCRIT 43 1 %  36 5-49 3   MCV 92 fL  82-98   MCH 32 1 pg  26 8-34 3   MCHC 34 8 g/dL  31 4-37 4   RDW 12 7 %  11 6-15 1   MPV 11 4 fL  8 9-12 7   PLATELET COUNT 206 Thousands/uL  149-390   nRBC AUTOMATED 0 /100 WBCs     NEUTROPHILS RELATIVE PERCENT 65 %  43-75   LYMPHOCYTES RELATIVE PERCENT 20 %  14-44   MONOCYTES RELATIVE PERCENT 11 %  4-12   EOSINOPHILS RELATIVE PERCENT 4 %  0-6   BASOPHILS RELATIVE PERCENT 0 %  0-1   NEUTROPHILS ABSOLUTE COUNT 3 22 Thousands/µL  1 85-7 62   LYMPHOCYTES ABSOLUTE COUNT 0 97 Thousands/µL  0 60-4 47   MONOCYTES ABSOLUTE COUNT 0 53 Thousand/µL  0 17-1 22   EOSINOPHILS ABSOLUTE COUNT 0 20 Thousand/µL  0 00-0 61   BASOPHILS ABSOLUTE COUNT 0 01 Thousands/µL  0 00-0 10     (1) COMPREHENSIVE METABOLIC PANEL 85YAN9292 78:59LA Damon Shields   National Kidney Disease Education Program recommendations are as follows:  GFR calculation is accurate only with a steady state creatinine  Chronic Kidney disease less than 60 ml/min/1 73 sq  meters  Kidney failure less than 15 ml/min/1 73 sq  meters  Test Name Result Flag Reference   GLUCOSE,RANDM 99 mg/dL     If the patient is fasting, the ADA then defines impaired fasting glucose as > 100 mg/dL and diabetes as > or equal to 123 mg/dL     SODIUM 140 mmol/L  136-145   POTASSIUM 3 8 mmol/L  3 5-5 3   CHLORIDE 106 mmol/L  100-108   CARBON DIOXIDE 29 mmol/L 21-32   ANION GAP (CALC) 5 mmol/L  4-13   BLOOD UREA NITROGEN 15 mg/dL  5-25   CREATININE 0 82 mg/dL  0 60-1 30   Standardized to IDMS reference method   CALCIUM 8 1 mg/dL L 8 3-10 1   BILI, TOTAL 0 51 mg/dL  0 20-1 00   ALK PHOSPHATAS 161 U/L H    ALT (SGPT) 54 U/L  12-78   AST(SGOT) 35 U/L  5-45   ALBUMIN 4 0 g/dL  3 5-5 0   TOTAL PROTEIN 7 5 g/dL  6 4-8 2   eGFR Non-African American      >60 0 ml/min/1 73sq m     Future Appointments    Date/Time Provider Specialty Site   04/14/2016 06:00 PM SANAZ Haji  [facility]   05/16/2016 04:00 PM SANAZ Haji   Family [facility]     Signatures   Electronically signed by : ASNAZ Herrera DOM D ,DO; Mar 10 2016  4:22PM EST                       (Author)

## 2018-01-13 VITALS
TEMPERATURE: 98.7 F | DIASTOLIC BLOOD PRESSURE: 84 MMHG | SYSTOLIC BLOOD PRESSURE: 142 MMHG | HEIGHT: 70 IN | RESPIRATION RATE: 20 BRPM | HEART RATE: 79 BPM | WEIGHT: 201.38 LBS | BODY MASS INDEX: 28.83 KG/M2 | OXYGEN SATURATION: 95 %

## 2018-01-13 VITALS
SYSTOLIC BLOOD PRESSURE: 140 MMHG | RESPIRATION RATE: 16 BRPM | WEIGHT: 212.5 LBS | TEMPERATURE: 98.3 F | HEART RATE: 78 BPM | DIASTOLIC BLOOD PRESSURE: 98 MMHG | BODY MASS INDEX: 31.47 KG/M2 | HEIGHT: 69 IN

## 2018-01-13 NOTE — MISCELLANEOUS
Message   Recorded as Task   Date: 10/27/2016 04:08 PM, Created By: Mary Galindo   Task Name: Call Back   Assigned To: Lydia Kimbrough   Regarding Patient: Melissa Rosas, Status: In Progress   Ivonjoshua Hugh - 27 Oct 2016 4:08 PM     TASK CREATED  patient calling having questions about a ct scan that was done a year ago of his chest  his family doc  is talking about a nodule that was on his LLL that he didnt know he had  his pcp wants him to have a repeat ct scan  he wants to know if dr Jody Song was aware of the lung nodule and if he should be worried or if dr Jody Song recommends him to have a repeat ct scan  please call him back at [phone]   Lydia Kimbrough - 28 Oct 2016 8:11 AM     TASK IN PROGRESS   Reviewed with Dr Patricia Macias - patient should have non contrast Chest CT repeated - if no changes from previous then no further scans necessary  Dr Patricia Macias thinks this may be environmental  Left msg for patient to return my call  Dr Patricia Macias will order CT if patient wishes  Active Problems    1  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)   2  Allergic rhinitis (477 9) (J30 9)   3  Benign essential hypertension (401 1) (I10)   4  Benign essential hypertension (401 1) (I10)   5  Blurred vision (368 8) (H53 8)   6  Dermatitis due to drug (693 0,E980 5) (L27 0)   7  Need for influenza vaccination (V04 81) (Z23)   8  Nodule of left lung (793 11) (R91 1)   9  Skin abrasion (919 0) (T14 8)    Current Meds   1  HydroCHLOROthiazide 12 5 MG Oral Tablet; TAKE 1 TABLET DAILY; Therapy: 98DWA2887 to (Evaluate:13Gkp4633)  Requested for: 27Oct2016; Last   Rx:27Oct2016 Ordered    Allergies    1   Penicillins    Signatures   Electronically signed by : Reggie Blackwell, ; Oct 28 2016  8:12AM EST                       (Author)

## 2018-01-14 VITALS
HEART RATE: 104 BPM | WEIGHT: 207.25 LBS | HEIGHT: 70 IN | RESPIRATION RATE: 18 BRPM | TEMPERATURE: 98.5 F | BODY MASS INDEX: 29.67 KG/M2 | SYSTOLIC BLOOD PRESSURE: 142 MMHG | DIASTOLIC BLOOD PRESSURE: 88 MMHG | OXYGEN SATURATION: 97 %

## 2018-01-14 NOTE — MISCELLANEOUS
Message   Recorded as Task   Date: 04/11/2016 03:59 PM, Created By: Romayne Orem   Task Name: Call Back   Assigned To: Lydia Kimbrough   Regarding Patient: Ran Keane, Status: Active   Comment:    Theresa Goodrich - 11 Apr 2016 3:59 PM     TASK CREATED  Caller: Self; General Medical Question; [phone] (Mobile Phone); [phone] x,,,,, (Work)  Pt had labs done through [address] [place] [address] in February  and is being charged for the test  Pt needs clarification on proper coding  Please contact cell number  By [address] APL/PML Jay Jay Parker is being denied by insurance - I will be reaching out to [employer] to find out what additional document need to be forwarded      Active Problems    1  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)   2  Allergic rhinitis (477 9) (J30 9)   3  Benign essential hypertension (401 1) (I10)   4  Benign essential hypertension (401 1) (I10)   5  Blurred vision (368 8) (H53 8)   6  Dermatitis due to drug (693 0,E980 5) (L27 0)   7  Need for influenza vaccination (V04 81) (Z23)   8  Skin abrasion (919 0) (T14 8)    Current Meds   1  Hydrochlorothiazide 12 5 MG Oral Tablet; TAKE 1 TABLET DAILY; Therapy: 44SAR2533 to (Evaluate:84Fys5505)  Requested for: 35EMI3719; Last   Rx:29Gpx6836 Ordered   2  Mupirocin 2 % External Ointment; APPLY A SMALL AMOUNT 3 TIMES A DAY AS   DIRECTED; Therapy: 02LMF3734 to (Evaluate:93Oci0295); Last Rx:03Msx5316 Ordered    Allergies    1   Penicillins    Signatures   Electronically signed by : Mague Feldman, ; Apr 11 2016  4:26PM EST                       (Author)

## 2018-01-15 NOTE — MISCELLANEOUS
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)   2  Allergic rhinitis (477 9) (J30 9)   3  Benign essential hypertension (401 1) (I10)   4  Benign essential hypertension (401 1) (I10)   5  Blurred vision (368 8) (H53 8)   6  Dermatitis due to drug (693 0,E980 5) (L27 0)   7  Need for influenza vaccination (V04 81) (Z23)   8  Nodule of left lung (793 11) (R91 1)   9  Skin abrasion (919 0) (T14 8)    Current Meds   1  HydroCHLOROthiazide 12 5 MG Oral Tablet; TAKE 1 TABLET DAILY; Therapy: 78UZK4401 to (Evaluate:00Vtj0421)  Requested for: 27Oct2016; Last   Rx:27Oct2016 Ordered    Allergies    1   Penicillins    Signatures   Electronically signed by : Fanny Villar, ; Dec 15 2016 12:51PM EST                       (Author)

## 2018-01-17 NOTE — PROGRESS NOTES
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 : PML/Ra-Ra by PCR    Discussion/Summary  Discussion Summary:   In summary, this is a 71-year-old male with a history of APL who reports to Medical Oncology for follow up of an enlarged left inguinal lymph node  #1 Indeterminate left inguinal LAD, seen on u/s  Mr Leilani Fish had a doppler ultrasound to evaluate for possible DVT after experiencing left lower extremity edema, a new bruise and pain  The U/S was negative for DVT but e/o a left inguinal lymph nod was identified  His pain and edema is improving  He does not have any other complaints or symptoms  There are no palpable inguinal LNS  The lymph node was likely reactive to a traumatic injury that initially caused the lower extremity bruise (pt denies known trauma to extremity)  He will contact us if he has any concerning symptoms  We will continue to follow regularly for his HX of APL  #2 APL, s/p systemic chemotherapy, on close clinical monitoring Q3 months  Continue current plan as outlined by Dr Montse Gaines - every 3 month follow up  Dr Montse Gaines also saw the patient face-to-face today and contributed to the above plan  Understands and agrees with treatment plan: The treatment plan was reviewed with the patient/guardian  The patient/guardian understands and agrees with the treatment plan       Counseling Documentation With Imm: The patient was counseled regarding diagnostic results, instructions for management, patient and family education, impressions  total time of encounter was 25 minutes and 20 minutes was spent counseling        Chief Complaint  Chief Complaint Free Text Note Form: f/u Acute Promyelocytic Leukemia      History of Present Illness  HPI: 6/9/15- patient presented to the hospital with bruising going on for a week or more  White blood cell count 51 9, hemoglobin 11 7, platelet count 17, 72% blasts  Fibrinogen 73, INR 1 96, PTT 31  Flow cytometry was consistent with a diagnosis of acute promyelocytic leukemia  Patient was started on ATRA 45 mg per meter squared in divided doses  Idarubicin was started 6/16/15 and administered 12 mg per meter squared days +2, +4, +6, pulse 8  On day #9 arsenic trioxide 0 15 mg per kilogram was initiated and continued daily  Treatment was complicated by stomatitis, pulmonary infiltrate (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 syndrome)  He was treated with a variety of antibiotics including acyclovir Micafungin, cefuroxime  He was treated with allopurinol and potassium  8/24/15- started ATRA 2 weeks on, 2 weeks off, Trisenox 4 weeks on, 4 weeks off   6/2016: Completed chemotherapy       Previous Therapy:   8/24/15- started ATRA 2 weeks on, 2 weeks off, Trisenox 4 weeks on, 2 weeks off  Current Therapy: Active surveillance Q3 months       Interval History: 9/19/2016: Mr Ines Rinne does not have any complaints today  He specifically denies pain, SOB, N/V, fatigue, easy bruising or bleeding, new masses, lesions or rashes  His appetite is good  ECOG=0  The remainder of a 12 point ROS was negative  11/3/2016:Mr Ines Rinne returns for evaluation of recent left lower extremity discomfort and follow up ultrasound result demonstrating a new 1 9 x 0 7 cm left inguinal LN  He reports mild tenderness in the left lower extremity that is improving over the past 1-2 weeks  Initially, he noticed a new bruise and went to the ED  A doppler u/s was performed to evaluate for DVT which was negative for DVT bu showed an enlarged lymph node in the left groin  A tib/fib XRay was negative for fracture  He was referred back to evaluate the indeterminate lymph node         Review of Systems  Complete-Male:   Constitutional: no fever, not feeling poorly and no chills  Cardiovascular: no chest pain  Respiratory: no shortness of breath, no cough and no shortness of breath during exertion  Gastrointestinal: no nausea  Musculoskeletal: No complaints of arthralgia, no myalgias, no joint swelling or stiffness, no limb pain or swelling, no limb pain and no limb swelling  Integumentary: No complaints of skin rash or skin lesions, no itching, no skin wound, no dry skin, as noted in HPI, no rashes and no skin lesions  Neurological: No compliants of headache, no confusion, no convulsions, no numbness or tingling, no dizziness or fainting, no limb weakness, no difficulty walking  Psychiatric: no depression  Endocrine: No complaints of proptosis, no hot flashes, no muscle weakness, no erectile dysfunction, no deepening of the voice, no feelings of weakness  Hematologic/Lymphatic: no tendency for easy bleeding, no tendency for easy bruising and no swollen glands in the neck  ROS Reviewed:   ROS reviewed  Active Problems    1  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)   2  Allergic rhinitis (477 9) (J30 9)   3  Benign essential hypertension (401 1) (I10)   4  Benign essential hypertension (401 1) (I10)   5  Blurred vision (368 8) (H53 8)   6  Dermatitis due to drug (693 0,E980 5) (L27 0)   7  Need for influenza vaccination (V04 81) (Z23)   8  Nodule of left lung (793 11) (R91 1)   9  Skin abrasion (919 0) (T14 8)    Past Medical History    1  History of Acute upper respiratory infection (465 9) (J06 9)   2  History of acute bronchitis (V12 69) (Z87 09)   3  History of hypokalemia (V12 29) (Z86 39)    Surgical History  Surgical History Reviewed: The surgical history was reviewed and updated today  Family History  Father    1  Family history of hypertension (V17 49) (Z82 49)  Family History Reviewed: The family history was reviewed and updated today  Social History    · Never A Smoker   · No alcohol use  Social History Reviewed: The social history was reviewed and is unchanged  Current Meds   1  HydroCHLOROthiazide 12 5 MG Oral Tablet; TAKE 1 TABLET DAILY; Therapy: 20KBT3577 to (Evaluate:01Nik1398)  Requested for: 27Oct2016; Last   Rx:27Oct2016 Ordered    Allergies    1  Penicillins    Vitals  Vital Signs    Recorded: 47XIA0199 95:78FD   Systolic 717   Diastolic 84   Heart Rate 699   Respiration 16   Temperature 98 5 F   Pain Scale 0   O2 Saturation 96   Height 5 ft 9 5 in   Weight 211 lb    BMI Calculated 30 71   BSA Calculated 2 12     Physical Exam    Constitutional   General appearance: No acute distress, well appearing and well nourished  Eyes   Conjunctiva and lids: No swelling, erythema, or discharge  Ears, Nose, Mouth, and Throat   External inspection of ears and nose: Normal     Nasal mucosa, septum, and turbinates: Normal without edema or erythema  Pulmonary   Respiratory effort: No increased work of breathing or signs of respiratory distress  Auscultation of lungs: Clear to auscultation, equal breath sounds bilaterally, no wheezes, no rales, no rhonci  Cardiovascular   Palpation of heart: Normal PMI, no thrills  Auscultation of heart: Normal rate and rhythm, normal S1 and S2, without murmurs  Examination of extremities for edema and/or varicosities: Normal     Abdomen   Abdomen: Non-tender, no masses  Liver and spleen: No hepatomegaly or splenomegaly  Lymphatic   Palpation of lymph nodes in neck: No lymphadenopathy  Musculoskeletal   Gait and station: Normal     Digits and nails: Normal without clubbing or cyanosis  Inspection/palpation of joints, bones, and muscles: Normal     Skin   Skin and subcutaneous tissue: Normal without rashes or lesions  resolving bruise on LLE, no open lesions or s/o infection  Mild associated edema  Neurologic   Sensation: No sensory loss      Psychiatric   Orientation to person, place and time: Normal     Mood and affect: Normal     Additional Exam:  No palpable inguinal adenopathy bilaterally  ECOG 0       Future Appointments    Date/Time Provider Specialty Site   12/22/2016 03:45 PM SANAZ Coffman , DO, [facility] [address] [place] [address] Rd     Signatures   Electronically signed by :  Roz Abraham, [facility]; Nov  3 2016  1:19PM EST                       (Co-author)    Electronically signed by : SANAZ Murillo ,DO; Nov 15 2016 11:57AM EST

## 2018-01-18 NOTE — MISCELLANEOUS
Message  Spoke with Haris Sosa, RN yesterday and today regarding patient  He had been experiencing diffuse frontal headache improved with tylenol  Denied any visual, hearing changes  No sinus pressure, congestion, sensation of post nasal drip  500 ccs hydration ordered yesterday and was beneficial  Additional ordered today  No nausea, vomiting  BP stable  Has been in 130s/90s  Also today reports flushing of skin without itching or any other symptoms  Will continue to monitor  She is to call tomorrow as well  Spoke with [facility] 2/11/16  Feeling better today  Less flushed  Took benadryl last night  Headache improved        Signatures   Electronically signed by : Debbie Harrington, [facility]; Feb 11 2016  4:57PM EST                       (Author)

## 2018-01-23 VITALS
DIASTOLIC BLOOD PRESSURE: 74 MMHG | WEIGHT: 211 LBS | OXYGEN SATURATION: 98 % | SYSTOLIC BLOOD PRESSURE: 138 MMHG | HEIGHT: 69 IN | TEMPERATURE: 97.9 F | HEART RATE: 101 BPM | BODY MASS INDEX: 31.25 KG/M2 | RESPIRATION RATE: 16 BRPM

## 2018-01-23 NOTE — RESULT NOTES
Verified Results  (1) TSH WITH FT4 REFLEX [id number] 06:41AM [place] [facility]     Test Name Result Flag Reference   TSH 3 830 uIU/mL H 0 358-3 740   Patients undergoing fluorescein dye angiography may retain small amounts of fluorescein in the body for 48-72 hours post procedure  Samples containing fluorescein can produce falsely depressed TSH values  If the patient had this procedure,a specimen should be resubmitted post fluorescein clearance  T4,FREE 0 99 ng/dL  0 76-1 46   Specimen collection should occur prior to Sulfasalazine administration due to the potential for falsely elevated results  (1) LIPID PANEL, FASTING [id number] 06:41AM [place] [facility]     Test Name Result Flag Reference   CHOLESTEROL 112 mg/dL     HDL,DIRECT 34 mg/dL L 40-60   Specimen collection should occur prior to Metamizole administration due to the potential for falsley depressed results  LDL CHOLESTEROL CALCULATED 59 mg/dL  0-100   Triglyceride:        Normal <150 mg/dl   Borderline High 150-199 mg/dl   High 200-499 mg/dl   Very High >499 mg/dl      Cholesterol:       Desirable <200 mg/dl    Borderline High 200-239 mg/dl    High >239 mg/dl      HDL Cholesterol:       High>59 mg/dL    Low <41 mg/dL      This screening LDL is a calculated result  It does not have the accuracy of the Direct Measured LDL in the monitoring of patients with hyperlipidemia and/or statin therapy  Direct Measure LDL ([id number]) must be ordered separately in these patients  TRIGLYCERIDES 95 mg/dL  <=150   Specimen collection should occur prior to N-Acetylcysteine or Metamizole administration due to the potential for falsely depressed results         Signatures   Electronically signed by : SANAZ Jerome ; Dec  8 2017 12:51PM EST                       (Author)

## 2018-04-05 DIAGNOSIS — I10 ESSENTIAL HYPERTENSION: Primary | ICD-10-CM

## 2018-04-05 RX ORDER — HYDROCHLOROTHIAZIDE 12.5 MG/1
12.5 TABLET ORAL DAILY
Qty: 90 TABLET | Refills: 3 | Status: SHIPPED | OUTPATIENT
Start: 2018-04-05 | End: 2018-10-27 | Stop reason: SDUPTHER

## 2018-05-23 ENCOUNTER — TELEPHONE (OUTPATIENT)
Dept: HEMATOLOGY ONCOLOGY | Facility: CLINIC | Age: 43
End: 2018-05-23

## 2018-05-23 DIAGNOSIS — C92.41 ACUTE PROMYELOCYTIC LEUKEMIA IN REMISSION (HCC): Primary | ICD-10-CM

## 2018-05-24 ENCOUNTER — APPOINTMENT (OUTPATIENT)
Dept: LAB | Facility: HOSPITAL | Age: 43
End: 2018-05-24
Attending: INTERNAL MEDICINE
Payer: COMMERCIAL

## 2018-05-24 DIAGNOSIS — C92.40 ACUTE PROMYELOCYTIC LEUKEMIA NOT HAVING ACHIEVED REMISSION (HCC): ICD-10-CM

## 2018-05-24 DIAGNOSIS — C92.41 ACUTE PROMYELOCYTIC LEUKEMIA IN REMISSION (HCC): ICD-10-CM

## 2018-05-24 LAB
ALBUMIN SERPL BCP-MCNC: 3.9 G/DL (ref 3.5–5)
ALP SERPL-CCNC: 68 U/L (ref 46–116)
ALT SERPL W P-5'-P-CCNC: 48 U/L (ref 12–78)
ANION GAP SERPL CALCULATED.3IONS-SCNC: 6 MMOL/L (ref 4–13)
AST SERPL W P-5'-P-CCNC: 29 U/L (ref 5–45)
BASOPHILS # BLD AUTO: 0.03 THOUSANDS/ΜL (ref 0–0.1)
BASOPHILS NFR BLD AUTO: 1 % (ref 0–1)
BILIRUB SERPL-MCNC: 1.05 MG/DL (ref 0.2–1)
BUN SERPL-MCNC: 15 MG/DL (ref 5–25)
CALCIUM SERPL-MCNC: 8.4 MG/DL (ref 8.3–10.1)
CHLORIDE SERPL-SCNC: 106 MMOL/L (ref 100–108)
CO2 SERPL-SCNC: 29 MMOL/L (ref 21–32)
CREAT SERPL-MCNC: 0.88 MG/DL (ref 0.6–1.3)
EOSINOPHIL # BLD AUTO: 0.24 THOUSAND/ΜL (ref 0–0.61)
EOSINOPHIL NFR BLD AUTO: 4 % (ref 0–6)
ERYTHROCYTE [DISTWIDTH] IN BLOOD BY AUTOMATED COUNT: 12 % (ref 11.6–15.1)
GFR SERPL CREATININE-BSD FRML MDRD: 105 ML/MIN/1.73SQ M
GLUCOSE SERPL-MCNC: 84 MG/DL (ref 65–140)
HCT VFR BLD AUTO: 44.6 % (ref 36.5–49.3)
HGB BLD-MCNC: 15.3 G/DL (ref 12–17)
LYMPHOCYTES # BLD AUTO: 2.04 THOUSANDS/ΜL (ref 0.6–4.47)
LYMPHOCYTES NFR BLD AUTO: 34 % (ref 14–44)
MCH RBC QN AUTO: 31.7 PG (ref 26.8–34.3)
MCHC RBC AUTO-ENTMCNC: 34.3 G/DL (ref 31.4–37.4)
MCV RBC AUTO: 93 FL (ref 82–98)
MONOCYTES # BLD AUTO: 0.35 THOUSAND/ΜL (ref 0.17–1.22)
MONOCYTES NFR BLD AUTO: 6 % (ref 4–12)
NEUTROPHILS # BLD AUTO: 3.37 THOUSANDS/ΜL (ref 1.85–7.62)
NEUTS SEG NFR BLD AUTO: 56 % (ref 43–75)
NRBC BLD AUTO-RTO: 0 /100 WBCS
PLATELET # BLD AUTO: 223 THOUSANDS/UL (ref 149–390)
PMV BLD AUTO: 11.1 FL (ref 8.9–12.7)
POTASSIUM SERPL-SCNC: 3.1 MMOL/L (ref 3.5–5.3)
PROT SERPL-MCNC: 7.1 G/DL (ref 6.4–8.2)
RBC # BLD AUTO: 4.82 MILLION/UL (ref 3.88–5.62)
SODIUM SERPL-SCNC: 141 MMOL/L (ref 136–145)
WBC # BLD AUTO: 6.05 THOUSAND/UL (ref 4.31–10.16)

## 2018-05-24 PROCEDURE — 85025 COMPLETE CBC W/AUTO DIFF WBC: CPT

## 2018-05-24 PROCEDURE — 36415 COLL VENOUS BLD VENIPUNCTURE: CPT

## 2018-05-24 PROCEDURE — 88374 M/PHMTRC ALYS ISHQUANT/SEMIQ: CPT

## 2018-05-24 PROCEDURE — 80053 COMPREHEN METABOLIC PANEL: CPT

## 2018-05-29 LAB — MISCELLANEOUS LAB TEST RESULT: NORMAL

## 2018-06-04 DIAGNOSIS — C92.40 ACUTE PROMYELOCYTIC LEUKEMIA NOT HAVING ACHIEVED REMISSION (HCC): ICD-10-CM

## 2018-06-07 ENCOUNTER — OFFICE VISIT (OUTPATIENT)
Dept: HEMATOLOGY ONCOLOGY | Facility: CLINIC | Age: 43
End: 2018-06-07
Payer: COMMERCIAL

## 2018-06-07 VITALS
BODY MASS INDEX: 28.2 KG/M2 | WEIGHT: 197 LBS | HEART RATE: 76 BPM | DIASTOLIC BLOOD PRESSURE: 74 MMHG | RESPIRATION RATE: 17 BRPM | TEMPERATURE: 98.5 F | HEIGHT: 70 IN | OXYGEN SATURATION: 98 % | SYSTOLIC BLOOD PRESSURE: 128 MMHG

## 2018-06-07 DIAGNOSIS — C92.41 ACUTE PROMYELOCYTIC LEUKEMIA IN REMISSION (HCC): Primary | ICD-10-CM

## 2018-06-07 PROCEDURE — 99213 OFFICE O/P EST LOW 20 MIN: CPT | Performed by: INTERNAL MEDICINE

## 2018-06-07 NOTE — LETTER
June 7, 2018     Jamal Mckenna, [facility]    Patient: Kemi Ravi   YOB: 1975   Date of Visit: 6/7/2018       Dear Dr Shirley Antonio:    Thank you for referring Kemi Ravi to me for evaluation  Below are my notes for this consultation  If you have questions, please do not hesitate to call me  I look forward to following your patient along with you  Sincerely,        Emerson Julian DO        CC: No Recipients  Emerson Julian DO  6/7/2018  9:32 AM  Sign at close encounter  Kemi Ravi  1975  [address]  [phone]    Chief Complaint   Patient presents with    Follow-up          No history exists  History of Present Illness:  -No ref  provider found:    -Interval History:  Clinically stable  Review of Systems:  Review of Systems   Constitutional: Negative for chills and fever  HENT: Negative for nosebleeds  Eyes: Negative for discharge  Respiratory: Negative for cough and shortness of breath  Cardiovascular: Negative for chest pain  Gastrointestinal: Negative for abdominal pain, constipation and diarrhea  Endocrine: Negative for polydipsia  Genitourinary: Negative for hematuria  Musculoskeletal: Negative for arthralgias  Skin: Negative for color change  Allergic/Immunologic: Negative for immunocompromised state  Neurological: Negative for dizziness and headaches  Hematological: Negative for adenopathy  Psychiatric/Behavioral: Negative for agitation         Patient Active Problem List   Diagnosis    Acute promyelocytic leukemia in remission [facility])     Past Medical History:   Diagnosis Date    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NAKUL M3) ([facility] )     Allergic rhinitis     Essential hypertension, benign     Leukemia ([facility] )     Personal history of other diseases of respiratory system      Past Surgical History: Procedure Laterality Date    BONE MARROW BIOPSY W/ ASPIRATION N/A     PORTACATH PLACEMENT Right      No family history on file  Social History     Social History    Marital status: /Civil Union     Spouse name: N/A    Number of children: N/A    Years of education: N/A     Occupational History    Not on file  Social History Main Topics    Smoking status: Never Smoker    Smokeless tobacco: Not on file    Alcohol use No    Drug use: No    Sexual activity: Not on file     Other Topics Concern    Not on file     Social History Narrative    No narrative on file       Current Outpatient Prescriptions:     hydrochlorothiazide (HYDRODIURIL) 12 5 mg tablet, Take 1 tablet (12 5 mg total) by mouth daily, Disp: 90 tablet, Rfl: 3  Allergies   Allergen Reactions    Penicillins      Vitals:    06/07/18 0911   BP: 128/74   Pulse: 76   Resp: 17   Temp: 98 5 °F (36 9 °C)   SpO2: 98%         Physical Exam   Constitutional: He is oriented to person, place, and time  He appears well-developed  HENT:   Head: Normocephalic  Eyes: Pupils are equal, round, and reactive to light  Neck: Neck supple  Cardiovascular: Normal rate and regular rhythm  No murmur heard  Pulmonary/Chest: Breath sounds normal  He has no wheezes  He has no rales  Abdominal: Soft  There is no tenderness  Musculoskeletal: Normal range of motion  He exhibits no edema or tenderness  Lymphadenopathy:     He has no cervical adenopathy  Neurological: He is alert and oriented to person, place, and time  He has normal reflexes  No cranial nerve deficit  Skin: No rash noted  No erythema  Psychiatric: He has a normal mood and affect  His behavior is normal            Performance Status: ECOG/Zubrod/WHO: 0 - Asymptomatic    Labs:  CBC, Coags, BMP, Mg, Phos     Imaging  No results found  I reviewed the above laboratory and imaging data        Discussion/Summary:  In summary, this is a 76-year-old male history of APL as outlined  Clinically he is doing well  He has no evidence recurrent or progressive disease  Recent PCR was negative  CBC and chemistry are normal with the exception of borderline hypokalemia, probably attributable to ongoing diuretic therapy  I reviewed the above with the patient  I suggested that he discuss the potassium level with his PCP and in the meantime look for high potassium containing foods and eat more of those  If all is well see him back in 6 months for review

## 2018-06-07 NOTE — PROGRESS NOTES
Venessalisbet Salazar  1975  [address] [facility] [place]  [address] [place] [id number] [phone]    Chief Complaint   Patient presents with    Follow-up          No history exists  History of Present Illness:  -No ref  provider found:    -Interval History:  Clinically stable  Review of Systems:  Review of Systems   Constitutional: Negative for chills and fever  HENT: Negative for nosebleeds  Eyes: Negative for discharge  Respiratory: Negative for cough and shortness of breath  Cardiovascular: Negative for chest pain  Gastrointestinal: Negative for abdominal pain, constipation and diarrhea  Endocrine: Negative for polydipsia  Genitourinary: Negative for hematuria  Musculoskeletal: Negative for arthralgias  Skin: Negative for color change  Allergic/Immunologic: Negative for immunocompromised state  Neurological: Negative for dizziness and headaches  Hematological: Negative for adenopathy  Psychiatric/Behavioral: Negative for agitation  Patient Active Problem List   Diagnosis    Acute promyelocytic leukemia in remission [facility])     Past Medical History:   Diagnosis Date    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NAKUL M3) ([facility] [address] [facility]     Allergic rhinitis     Essential hypertension, benign     Leukemia ([facility] )     Personal history of other diseases of respiratory system      Past Surgical History:   Procedure Laterality Date    BONE MARROW BIOPSY W/ ASPIRATION N/A     PORTACATH PLACEMENT Right      No family history on file  Social History     Social History    Marital status: /Civil Union     Spouse name: N/A    Number of children: N/A    Years of education: N/A     Occupational History    Not on file       Social History Main Topics    Smoking status: Never Smoker    Smokeless tobacco: Not on file    Alcohol use No    Drug use: No    Sexual activity: Not on file     Other Topics Concern    Not on file Social History Narrative    No narrative on file       Current Outpatient Prescriptions:     hydrochlorothiazide (HYDRODIURIL) 12 5 mg tablet, Take 1 tablet (12 5 mg total) by mouth daily, Disp: 90 tablet, Rfl: 3  Allergies   Allergen Reactions    Penicillins      Vitals:    06/07/18 0911   BP: 128/74   Pulse: 76   Resp: 17   Temp: 98 5 °F (36 9 °C)   SpO2: 98%         Physical Exam   Constitutional: He is oriented to person, place, and time  He appears well-developed  HENT:   Head: Normocephalic  Eyes: Pupils are equal, round, and reactive to light  Neck: Neck supple  Cardiovascular: Normal rate and regular rhythm  No murmur heard  Pulmonary/Chest: Breath sounds normal  He has no wheezes  He has no rales  Abdominal: Soft  There is no tenderness  Musculoskeletal: Normal range of motion  He exhibits no edema or tenderness  Lymphadenopathy:     He has no cervical adenopathy  Neurological: He is alert and oriented to person, place, and time  He has normal reflexes  No cranial nerve deficit  Skin: No rash noted  No erythema  Psychiatric: He has a normal mood and affect  His behavior is normal            Performance Status: ECOG/Zubrod/WHO: 0 - Asymptomatic    Labs:  CBC, Coags, BMP, Mg, Phos     Imaging  No results found  I reviewed the above laboratory and imaging data  Discussion/Summary:  In summary, this is a 58-year-old male history of APL as outlined  Clinically he is doing well  He has no evidence recurrent or progressive disease  Recent PCR was negative  CBC and chemistry are normal with the exception of borderline hypokalemia, probably attributable to ongoing diuretic therapy  I reviewed the above with the patient  I suggested that he discuss the potassium level with his PCP and in the meantime look for high potassium containing foods and eat more of those  If all is well see him back in 6 months for review

## 2018-06-07 NOTE — LETTER
June 7, 2018     [address]    Patient: Humble Lainez   YOB: 1975   Date of Visit: 6/7/2018       Dear Dr Adina Vital:    Thank you for referring Humble Lainez to me for evaluation  Below are my notes for this consultation  If you have questions, please do not hesitate to call me  I look forward to following your patient along with you  Sincerely,        Elbert Ledezma DO        CC: No Recipients  Elbert Ledezma DO  6/7/2018  9:24 AM  Sign at close encounter  Humble Lainez  1975  [address] [address] [phone]    Chief Complaint   Patient presents with    Follow-up       Advance Care Planning/Advance Directives:  {VALERIA CLN ACP/AD:54122}     No history exists  History of Present Illness:  -No ref  provider found:  -Previous Therapy (if not listed in Oncology History):  -Current Therapy (if not listed in Oncology History):  -Disease Status:  -Interval History:  -Tumor Markers:    Review of Systems:  Review of Systems    Patient Active Problem List   Diagnosis    Acute promyelocytic leukemia in remission [facility])     Past Medical History:   Diagnosis Date    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NAKUL M3) ([facility] [address] )     Allergic rhinitis     Essential hypertension, benign     Leukemia ([facility] )     Personal history of other diseases of respiratory system      Past Surgical History:   Procedure Laterality Date    BONE MARROW BIOPSY W/ ASPIRATION N/A     PORTACATH PLACEMENT Right      No family history on file  Social History     Social History    Marital status: /Civil Union     Spouse name: N/A    Number of children: N/A    Years of education: N/A     Occupational History    Not on file       Social History Main Topics    Smoking status: Never Smoker    Smokeless tobacco: Not on file    Alcohol use No    Drug use: No    Sexual activity: Not on file     Other Topics Concern    Not on file     Social History Narrative    No narrative on file       Current Outpatient Prescriptions:     hydrochlorothiazide (HYDRODIURIL) 12 5 mg tablet, Take 1 tablet (12 5 mg total) by mouth daily, Disp: 90 tablet, Rfl: 3  Allergies   Allergen Reactions    Penicillins      Vitals:    06/07/18 0911   BP: 128/74   Pulse: 76   Resp: 17   Temp: 98 5 °F (36 9 °C)   SpO2: 98%         Physical Exam        Performance Status: {Performance Status Choice:56802}    Labs:  {Lab Choice:16868}    Imaging  No results found  I reviewed the above laboratory and imaging data        Discussion/Summary:

## 2018-10-27 ENCOUNTER — OFFICE VISIT (OUTPATIENT)
Dept: FAMILY MEDICINE CLINIC | Facility: CLINIC | Age: 43
End: 2018-10-27
Payer: COMMERCIAL

## 2018-10-27 VITALS
DIASTOLIC BLOOD PRESSURE: 100 MMHG | WEIGHT: 209.6 LBS | TEMPERATURE: 97.8 F | SYSTOLIC BLOOD PRESSURE: 150 MMHG | BODY MASS INDEX: 30.01 KG/M2 | HEIGHT: 70 IN | HEART RATE: 84 BPM | RESPIRATION RATE: 16 BRPM

## 2018-10-27 DIAGNOSIS — I10 ESSENTIAL HYPERTENSION: ICD-10-CM

## 2018-10-27 DIAGNOSIS — J01.00 ACUTE NON-RECURRENT MAXILLARY SINUSITIS: Primary | ICD-10-CM

## 2018-10-27 PROBLEM — J30.2 SEASONAL ALLERGIES: Status: ACTIVE | Noted: 2017-05-17

## 2018-10-27 PROCEDURE — 99213 OFFICE O/P EST LOW 20 MIN: CPT | Performed by: FAMILY MEDICINE

## 2018-10-27 RX ORDER — MOMETASONE FUROATE 50 UG/1
2 SPRAY, METERED NASAL DAILY
Qty: 17 G | Refills: 0 | Status: SHIPPED | OUTPATIENT
Start: 2018-10-27 | End: 2019-12-03 | Stop reason: CLARIF

## 2018-10-27 RX ORDER — HYDROCHLOROTHIAZIDE 25 MG/1
12.5 TABLET ORAL DAILY
Qty: 90 TABLET | Refills: 1 | Status: SHIPPED | OUTPATIENT
Start: 2018-10-27 | End: 2018-11-23 | Stop reason: ALTCHOICE

## 2018-10-27 RX ORDER — BROMPHENIRAMINE MALEATE, PSEUDOEPHEDRINE HYDROCHLORIDE, AND DEXTROMETHORPHAN HYDROBROMIDE 2; 30; 10 MG/5ML; MG/5ML; MG/5ML
5 SYRUP ORAL 3 TIMES DAILY PRN
Qty: 120 ML | Refills: 0 | Status: SHIPPED | OUTPATIENT
Start: 2018-10-27 | End: 2018-11-08 | Stop reason: ALTCHOICE

## 2018-10-27 NOTE — PROGRESS NOTES
Chief Complaint   Patient presents with    URI     Symptoms head pressure, sinus congestion, productive cough, and tightness in chest since Wednesday  Assessment/Plan:    No problem-specific Assessment & Plan notes found for this encounter  Diagnoses and all orders for this visit:    Acute non-recurrent maxillary sinusitis  -     mometasone (NASONEX) 50 mcg/act nasal spray; 2 sprays into each nostril daily  -     brompheniramine-pseudoephedrine-DM 30-2-10 MG/5ML syrup; Take 5 mL by mouth 3 (three) times a day as needed for allergies    Essential hypertension  -     hydrochlorothiazide (HYDRODIURIL) 25 mg tablet; Take 0 5 tablets (12 5 mg total) by mouth daily        Take meds as directed  BP elevated, will inc HCTZ, he has not followed with his PCP in a while, recommend fu for BP in 1-2 weeks  Call if sxs persist or any worsening sxs despite recommendations  Subjective:      Patient ID: Bailey Luna is a 39 y o  male  HPI     Pt presents bc for 4 days of congestion, wet cough, nasal congestion, sinus pressure  Denies any ear pain, no sore throat, no fevers, no chills  Denies any SOB,   chest pain when he coughs  He took his hctz just before coming into office, but he has not been monitoring his BP  The following portions of the patient's history were reviewed and updated as appropriate: allergies, current medications, past family history, past medical history, past social history, past surgical history and problem list     Review of Systems   Constitutional: Negative for activity change and appetite change  HENT: Positive for congestion and rhinorrhea  Respiratory: Positive for cough  Cardiovascular: Negative  Gastrointestinal: Negative  Genitourinary: Negative  Musculoskeletal: Negative for arthralgias  Skin: Negative for rash  Psychiatric/Behavioral: Negative            Objective:      /100 (BP Location: Right arm, Patient Position: Sitting, Cuff Size: Standard)   Pulse 84   Temp 97 8 °F (36 6 °C) (Tympanic)   Resp 16   Ht 5' 9 5" (1 765 m)   Wt 95 1 kg (209 lb 9 6 oz)   BMI 30 51 kg/m²          Physical Exam   Constitutional: He is oriented to person, place, and time  He appears well-developed and well-nourished  No distress  HENT:   Head: Normocephalic  Right Ear: Tympanic membrane normal    Left Ear: Tympanic membrane normal    Nose: Rhinorrhea present  Right sinus exhibits frontal sinus tenderness  Left sinus exhibits frontal sinus tenderness  Mouth/Throat: Uvula is midline and mucous membranes are normal  Posterior oropharyngeal erythema present  Eyes: Conjunctivae are normal    Cardiovascular: Normal rate, regular rhythm and normal heart sounds  Pulmonary/Chest: Effort normal and breath sounds normal  No respiratory distress  He has no wheezes  He has no rales  Neurological: He is alert and oriented to person, place, and time  Psychiatric: He has a normal mood and affect   His behavior is normal

## 2018-11-08 ENCOUNTER — OFFICE VISIT (OUTPATIENT)
Dept: FAMILY MEDICINE CLINIC | Facility: CLINIC | Age: 43
End: 2018-11-08
Payer: COMMERCIAL

## 2018-11-08 VITALS
WEIGHT: 207.6 LBS | DIASTOLIC BLOOD PRESSURE: 88 MMHG | SYSTOLIC BLOOD PRESSURE: 132 MMHG | BODY MASS INDEX: 29.72 KG/M2 | HEART RATE: 92 BPM | RESPIRATION RATE: 20 BRPM | OXYGEN SATURATION: 95 % | HEIGHT: 70 IN | TEMPERATURE: 97.6 F

## 2018-11-08 DIAGNOSIS — R05.3 PERSISTENT DRY COUGH: ICD-10-CM

## 2018-11-08 DIAGNOSIS — C92.41 ACUTE PROMYELOCYTIC LEUKEMIA IN REMISSION (HCC): ICD-10-CM

## 2018-11-08 DIAGNOSIS — E87.6 HYPOKALEMIA: ICD-10-CM

## 2018-11-08 DIAGNOSIS — J30.1 SEASONAL ALLERGIC RHINITIS DUE TO POLLEN: ICD-10-CM

## 2018-11-08 DIAGNOSIS — L98.9 FACIAL SKIN LESION: ICD-10-CM

## 2018-11-08 DIAGNOSIS — I10 BENIGN ESSENTIAL HYPERTENSION: Primary | ICD-10-CM

## 2018-11-08 DIAGNOSIS — B07.8 OTHER VIRAL WARTS: ICD-10-CM

## 2018-11-08 DIAGNOSIS — J98.01 BRONCHOSPASM: ICD-10-CM

## 2018-11-08 PROBLEM — J01.00 ACUTE NON-RECURRENT MAXILLARY SINUSITIS: Status: RESOLVED | Noted: 2018-10-27 | Resolved: 2018-11-08

## 2018-11-08 PROCEDURE — 3079F DIAST BP 80-89 MM HG: CPT | Performed by: FAMILY MEDICINE

## 2018-11-08 PROCEDURE — 99214 OFFICE O/P EST MOD 30 MIN: CPT | Performed by: FAMILY MEDICINE

## 2018-11-08 PROCEDURE — 3075F SYST BP GE 130 - 139MM HG: CPT | Performed by: FAMILY MEDICINE

## 2018-11-08 RX ORDER — ALBUTEROL SULFATE 90 UG/1
2 AEROSOL, METERED RESPIRATORY (INHALATION) EVERY 6 HOURS PRN
Qty: 18 G | Refills: 0 | Status: SHIPPED | OUTPATIENT
Start: 2018-11-08 | End: 2019-12-03 | Stop reason: CLARIF

## 2018-11-08 RX ORDER — FLUTICASONE FUROATE AND VILANTEROL 100; 25 UG/1; UG/1
1 POWDER RESPIRATORY (INHALATION) DAILY
Qty: 1 INHALER | Refills: 0 | Status: SHIPPED | COMMUNITY
Start: 2018-11-08 | End: 2018-11-23 | Stop reason: SDUPTHER

## 2018-11-08 NOTE — PROGRESS NOTES
Chief Complaint   Patient presents with    Hypertension     Health Maintenance   Topic Date Due    DTaP,Tdap,and Td Vaccines (1 - Tdap) 07/23/1995    Pneumococcal PPSV23 Highest Risk Adult (2 of 3 - PCV13) 10/15/2016    INFLUENZA VACCINE  07/01/2018    Depression Screening PHQ  11/08/2019     Assessment/Plan:    Benign essential hypertension  Blood pressure has improved since last visit in October  Continue HCTZ 12 5 mg 2 tablets daily  Recommended to follow a low-sodium diet, regular exercise, lose weight  Hypokalemia  Recommended to increase dietary potassium intake  Check BMP  Seasonal allergies  Continue Nasonex - 2 spr  In each nostril daily  Persistent dry cough  Will check chest x-ray to r/o  lung pathology  Will order PFTs to r/o asthma  Sample of Breo Ellipta 100/25 mcg given to patient - use 1 puff daily  Instructed to rinse mouth after using inhaler  Bronchospasm  Prescription sent to the pharmacy for Ventolin HFA inhaler to use 2 puffs every 4 - 6 hr as needed for chest tightness or wheezing  Acute promyelocytic leukemia in remission  Currently in remission  Follow- up with hematology- oncology Dr Ronal King every 6 months  Warts on R hand / Skin lesion on L side of the nose - referred to dermatologist for evaluation  Check labs  Schedule follow-up office visit in 2 weeks, will review test results  Diagnoses and all orders for this visit:    Benign essential hypertension  -     Comprehensive metabolic panel; Future  -     Lipid panel; Future  -     TSH, 3rd generation with Free T4 reflex; Future    Hypokalemia  -     Comprehensive metabolic panel; Future    Seasonal allergic rhinitis due to pollen    Persistent dry cough  -     Complete pulmonary function test; Future  -     XR chest pa & lateral; Future    Bronchospasm  -     albuterol (VENTOLIN HFA) 90 mcg/act inhaler;  Inhale 2 puffs every 6 (six) hours as needed for wheezing    [facility] )    Other viral warts  -     Ambulatory referral to Dermatology; Future    Facial skin lesion  -     Ambulatory referral to Dermatology; Future          Subjective:      Patient ID: Amrita Morgan is a 39 y o  male  HPI     Patient is 43-year-old male with Hypertension, acute promyelocytic leukemia  He presents for a routine follow-up office visit  He was seen for acute URI on 10/27/18 by Dr Piter Weinberg  Blood pressure was elevated at 150/100  He was recommended to increase dose of   HCTZ 12 5 mg to 2 tablets daily  BP improved since last visit  Denies chest pain, shortness of breath dizziness  Patient c/o persistent dry cough, occasional wheezing and chest tightness  Patient denies prior history of asthma  Denies tobacco use  C/o nasal congestion, postnasal drainage  He uses Nasonex nasal spray with minimal relief in symptoms  Acute promyelocytic leukemia - management per hematology- oncology Dr Montse Gaines,  last seen in 6/18  Currently in remission  Blood test done in May 2018 showed normal CBC with dif  Potassium level was low at 3 1, creatinine 0 88,   glucose 84  The following portions of the patient's history were reviewed and updated as appropriate: allergies, current medications, past family history, past social history, past surgical history and problem list     Review of Systems   Constitutional: Negative for activity change, appetite change, chills, fatigue and fever  HENT: Positive for congestion and postnasal drip  Negative for ear pain, hearing loss, nosebleeds, sinus pressure, sore throat, tinnitus and trouble swallowing  Eyes: Negative for pain, discharge, redness, itching and visual disturbance  Respiratory: Positive for cough (persistent dry cough), chest tightness (occasionally) and wheezing (occasionally)  Negative for shortness of breath  Cardiovascular: Negative for chest pain, palpitations and leg swelling  Gastrointestinal: Negative for abdominal pain, blood in stool, constipation, diarrhea, nausea and vomiting  Genitourinary: Negative for difficulty urinating, dysuria, flank pain, frequency and hematuria  Musculoskeletal: Negative for arthralgias, back pain, joint swelling, myalgias and neck pain  Skin: Negative for rash and wound  Warts on R hand  Skin lesion on L side of the nose  Neurological: Negative for dizziness, syncope and headaches  Hematological: Negative for adenopathy  Does not bruise/bleed easily  Psychiatric/Behavioral: Negative  Objective:      /88 (BP Location: Left arm, Patient Position: Sitting, Cuff Size: Adult)   Pulse 92   Temp 97 6 °F (36 4 °C) (Oral)   Resp 20   Ht 5' 9 5" (1 765 m)   Wt 94 2 kg (207 lb 9 6 oz)   SpO2 95%   BMI 30 22 kg/m²          Physical Exam   Constitutional: He appears well-developed and well-nourished  Mildly obese   HENT:   Head: Normocephalic and atraumatic  Right Ear: External ear normal    Left Ear: External ear normal    Mouth/Throat: Oropharynx is clear and moist    Nasal mucosa is edematous   Eyes: Pupils are equal, round, and reactive to light  Conjunctivae are normal    Cardiovascular: Normal rate, regular rhythm and normal heart sounds  No murmur heard  No carotid bruits BL  No BL LE edema   Pulmonary/Chest: Effort normal  He has no wheezes  He has no rales  Decreased breath sounds at R lung base   Abdominal: Soft  Bowel sounds are normal  There is no tenderness  Musculoskeletal: Normal range of motion  He exhibits no edema, tenderness or deformity  Skin: Skin is warm and dry  No rash noted  Papular skin lesion 3 x 4 mm on L side of the nose  Warts on R hand  Psychiatric: He has a normal mood and affect  Nursing note and vitals reviewed

## 2018-11-09 NOTE — ASSESSMENT & PLAN NOTE
Prescription sent to the pharmacy for Ventolin HFA inhaler to use 2 puffs every 4 - 6 hr as needed for chest tightness or wheezing

## 2018-11-09 NOTE — ASSESSMENT & PLAN NOTE
Will check chest x-ray to r/o  lung pathology  Will order PFTs to r/o asthma  Sample of Breo Ellipta 100/25 mcg given to patient - use 1 puff daily  Instructed to rinse mouth after using inhaler

## 2018-11-09 NOTE — ASSESSMENT & PLAN NOTE
Blood pressure has improved since last visit in October  Continue HCTZ 12 5 mg 2 tablets daily  Recommended to follow a low-sodium diet, regular exercise, lose weight

## 2018-11-12 ENCOUNTER — HOSPITAL ENCOUNTER (OUTPATIENT)
Dept: RADIOLOGY | Facility: HOSPITAL | Age: 43
Discharge: HOME | End: 2018-11-12
Payer: COMMERCIAL

## 2018-11-12 ENCOUNTER — TRANSCRIBE ORDERS (OUTPATIENT)
Dept: RADIOLOGY | Facility: HOSPITAL | Age: 43
End: 2018-11-12

## 2018-11-12 DIAGNOSIS — R05.3 PERSISTENT DRY COUGH: ICD-10-CM

## 2018-11-12 PROCEDURE — 71046 X-RAY EXAM CHEST 2 VIEWS: CPT

## 2018-11-22 PROBLEM — E66.9 MILD OBESITY: Status: ACTIVE | Noted: 2018-11-22

## 2018-11-23 ENCOUNTER — APPOINTMENT (OUTPATIENT)
Dept: LAB | Facility: HOSPITAL | Age: 43
End: 2018-11-23
Payer: COMMERCIAL

## 2018-11-23 ENCOUNTER — OFFICE VISIT (OUTPATIENT)
Dept: FAMILY MEDICINE CLINIC | Facility: CLINIC | Age: 43
End: 2018-11-23
Payer: COMMERCIAL

## 2018-11-23 VITALS
OXYGEN SATURATION: 98 % | HEIGHT: 70 IN | WEIGHT: 209.8 LBS | SYSTOLIC BLOOD PRESSURE: 138 MMHG | HEART RATE: 70 BPM | RESPIRATION RATE: 16 BRPM | DIASTOLIC BLOOD PRESSURE: 98 MMHG | BODY MASS INDEX: 30.03 KG/M2 | TEMPERATURE: 98.1 F

## 2018-11-23 DIAGNOSIS — I10 BENIGN ESSENTIAL HYPERTENSION: ICD-10-CM

## 2018-11-23 DIAGNOSIS — R05.3 PERSISTENT DRY COUGH: Primary | ICD-10-CM

## 2018-11-23 DIAGNOSIS — J45.30 MILD PERSISTENT ASTHMA WITHOUT COMPLICATION: ICD-10-CM

## 2018-11-23 DIAGNOSIS — E87.6 HYPOKALEMIA: ICD-10-CM

## 2018-11-23 DIAGNOSIS — E66.9 MILD OBESITY: ICD-10-CM

## 2018-11-23 DIAGNOSIS — J30.1 SEASONAL ALLERGIC RHINITIS DUE TO POLLEN: ICD-10-CM

## 2018-11-23 LAB
ALBUMIN SERPL BCP-MCNC: 3.7 G/DL (ref 3.5–5)
ALP SERPL-CCNC: 72 U/L (ref 46–116)
ALT SERPL W P-5'-P-CCNC: 46 U/L (ref 12–78)
ANION GAP SERPL CALCULATED.3IONS-SCNC: 5 MMOL/L (ref 4–13)
AST SERPL W P-5'-P-CCNC: 23 U/L (ref 5–45)
BILIRUB SERPL-MCNC: 0.72 MG/DL (ref 0.2–1)
BUN SERPL-MCNC: 22 MG/DL (ref 5–25)
CALCIUM SERPL-MCNC: 9.2 MG/DL (ref 8.3–10.1)
CHLORIDE SERPL-SCNC: 103 MMOL/L (ref 100–108)
CHOLEST SERPL-MCNC: 127 MG/DL (ref 50–200)
CO2 SERPL-SCNC: 30 MMOL/L (ref 21–32)
CREAT SERPL-MCNC: 1.08 MG/DL (ref 0.6–1.3)
GFR SERPL CREATININE-BSD FRML MDRD: 83 ML/MIN/1.73SQ M
GLUCOSE P FAST SERPL-MCNC: 88 MG/DL (ref 65–99)
HDLC SERPL-MCNC: 37 MG/DL (ref 40–60)
LDLC SERPL CALC-MCNC: 78 MG/DL (ref 0–100)
NONHDLC SERPL-MCNC: 90 MG/DL
POTASSIUM SERPL-SCNC: 3.8 MMOL/L (ref 3.5–5.3)
PROT SERPL-MCNC: 7.5 G/DL (ref 6.4–8.2)
SODIUM SERPL-SCNC: 138 MMOL/L (ref 136–145)
TRIGL SERPL-MCNC: 59 MG/DL
TSH SERPL DL<=0.05 MIU/L-ACNC: 2.97 UIU/ML (ref 0.36–3.74)

## 2018-11-23 PROCEDURE — 84443 ASSAY THYROID STIM HORMONE: CPT

## 2018-11-23 PROCEDURE — 99214 OFFICE O/P EST MOD 30 MIN: CPT | Performed by: FAMILY MEDICINE

## 2018-11-23 PROCEDURE — 80053 COMPREHEN METABOLIC PANEL: CPT

## 2018-11-23 PROCEDURE — 80061 LIPID PANEL: CPT

## 2018-11-23 PROCEDURE — 36415 COLL VENOUS BLD VENIPUNCTURE: CPT

## 2018-11-23 PROCEDURE — 1036F TOBACCO NON-USER: CPT | Performed by: FAMILY MEDICINE

## 2018-11-23 PROCEDURE — 3008F BODY MASS INDEX DOCD: CPT | Performed by: FAMILY MEDICINE

## 2018-11-23 RX ORDER — BENAZEPRIL HYDROCHLORIDE AND HYDROCHLOROTHIAZIDE 20; 12.5 MG/1; MG/1
1 TABLET ORAL DAILY
Qty: 30 TABLET | Refills: 3 | Status: SHIPPED | OUTPATIENT
Start: 2018-11-23 | End: 2019-03-27 | Stop reason: SDUPTHER

## 2018-11-23 RX ORDER — FLUTICASONE FUROATE AND VILANTEROL 100; 25 UG/1; UG/1
1 POWDER RESPIRATORY (INHALATION) DAILY
Qty: 3 INHALER | Refills: 1 | Status: SHIPPED | COMMUNITY
Start: 2018-11-23 | End: 2018-12-06 | Stop reason: ALTCHOICE

## 2018-11-23 NOTE — ASSESSMENT & PLAN NOTE
Chest x-ray showed no acute cardiopulmonary disease  Patient reports improvement in symptoms after starting on Breo Ellipta 100/25 mcg 1 puff daily

## 2018-11-23 NOTE — ASSESSMENT & PLAN NOTE
Dry cough, chest tightness, wheezing improved after starting on Breo Ellipta 100/25 mcg 1 puff  daily  Recommended to continue current dose  Use Ventolin HFA PRN  Schedule appointment with allergist, check PFT's

## 2018-11-23 NOTE — PROGRESS NOTES
Chief Complaint   Patient presents with    Follow-up     2 week follow up     Health Maintenance   Topic Date Due    DTaP,Tdap,and Td Vaccines (1 - Tdap) 07/23/1995    Pneumococcal PPSV23 Highest Risk Adult (2 of 3 - PCV13) 10/15/2016    INFLUENZA VACCINE  07/01/2018    Depression Screening PHQ  11/08/2019     Assessment/Plan:    Persistent dry cough  Chest x-ray showed no acute cardiopulmonary disease  Patient reports improvement in symptoms after starting on Breo Ellipta 100/25 mcg 1 puff daily  Mild persistent asthma without complication  Dry cough, chest tightness, wheezing improved after starting on Breo Ellipta 100/25 mcg 1 puff  daily  Recommended to continue current dose  Use Ventolin HFA PRN  Schedule appointment with allergist, check PFT's  Seasonal allergies  Continue to use Nasonex nasal spray -2 sprays in each nostril daily  Benign essential hypertension  Blood pressure is elevated  Recommended to d/c HCTZ 25 mcg daily/  Will start Benazepril/HCTZ 20/12 5 mg one tablet daily  Follow a low-sodium diet  Monitor blood pressure at home  Check BMP in 10 -14 days  Will re-evaluate in 2 weeks  Mild obesity  Patient recommended to start regular exercise  Goal for HDL > 40  Encouraged weight reduction  I have spent 25 minutes with Patient  today in which greater than 50% of this time was spent in counseling/coordination of care regarding Diagnostic results, Risks and benefits of tx options, Intructions for management, Patient and family education, Importance of tx compliance, Risk factor reductions and Impressions    Schedule follow-up visit for HTN in 2 weeks  Diagnoses and all orders for this visit:    Persistent dry cough  -     fluticasone-vilanterol (BREO ELLIPTA) 100-25 mcg/inh inhaler; Inhale 1 puff daily Rinse mouth after use  Mild persistent asthma without complication  -     fluticasone-vilanterol (BREO ELLIPTA) 100-25 mcg/inh inhaler;  Inhale 1 puff daily Rinse mouth after use  -     Ambulatory referral to Allergy; Future    Seasonal allergic rhinitis due to pollen  -     Ambulatory referral to Allergy; Future    Benign essential hypertension  -     benazepril-hydrochlorthiazide (LOTENSIN HCT) 20-12 5 MG per tablet; Take 1 tablet by mouth daily  -     Basic metabolic panel; Future    Mild obesity          Subjective:      Patient ID: Latoya Mayorga is a 39 y o  male  HPI     Patient presents to the office for 2 week follow-up visit for Hypertension, persistent dry cough, bronchospasm, seasonal allergies  Patient had blood work done this morning  Fasting blood sugar 88, potassium 3 8, creatinine 1 08, TSH 2 970, cholesterol 127, HDL 37, LDL 78, triglycerides 59  Patient was started on Breo Ellipta 100/25 mcg one puff daily at the last visit  He reports improvement in persistent dry cough  C/o occasional chest tightness  No wheezing  Patient was seen approximately 20 years ago for allergies by allergist Dr Allison Arteaga  He has allergy to rag weed, pollens, grass  Chest x-ray done on 11/12/18 showed no acute cardiopulmonary disease  Patient did not schedule PFT's yet  Patient does not exercise on a regular basis  He plans to join the gym  HTN - BP remains elevated  Patient takes HCTZ 25 mg daily  Denies chest pain, shortness of breath, dizziness  Denies tobacco use  Patient has acute promyelocytic leukemia -  currently in remission  Patient has follow-up office visit with hematology- oncology Dr Willie Shoemaker next month  The following portions of the patient's history were reviewed and updated as appropriate: allergies, current medications, past medical history, past social history, past surgical history and problem list     Review of Systems   Constitutional: Negative for activity change, appetite change, chills, fatigue and fever  HENT: Positive for congestion (mild) and postnasal drip   Negative for ear pain, hearing loss, sinus pressure, sore throat and trouble swallowing  Eyes: Negative for pain, discharge, redness, itching and visual disturbance  Respiratory: Positive for cough (dry cough improved), chest tightness (improved) and wheezing (occasionally)  Negative for shortness of breath  Cardiovascular: Negative for chest pain, palpitations and leg swelling  Gastrointestinal: Negative for abdominal pain, blood in stool, constipation, diarrhea, nausea and vomiting  Genitourinary: Negative for difficulty urinating, dysuria, flank pain, frequency and hematuria  Musculoskeletal: Negative for arthralgias, back pain, joint swelling, myalgias and neck pain  Skin: Negative for rash and wound  Neurological: Negative for dizziness and headaches  Hematological: Negative  Psychiatric/Behavioral: Negative  Objective:      /98 (BP Location: Left arm, Patient Position: Sitting, Cuff Size: Adult)   Pulse 70   Temp 98 1 °F (36 7 °C) (Oral)   Resp 16   Ht 5' 9 5" (1 765 m)   Wt 95 2 kg (209 lb 12 8 oz)   SpO2 98%   BMI 30 54 kg/m²          Physical Exam   Constitutional: He appears well-developed and well-nourished  Mildly obese   HENT:   Head: Normocephalic and atraumatic  Right Ear: External ear normal    Left Ear: External ear normal    Mouth/Throat: Oropharynx is clear and moist    Eyes: Pupils are equal, round, and reactive to light  Conjunctivae are normal    Cardiovascular: Normal rate, regular rhythm and normal heart sounds  No murmur heard  No BL LE edema   Pulmonary/Chest: Effort normal and breath sounds normal  He has no wheezes  He has no rales  Abdominal: Soft  Bowel sounds are normal  There is no tenderness  Musculoskeletal: Normal range of motion  He exhibits no edema, tenderness or deformity  Skin: Skin is warm and dry  No rash noted  Psychiatric: He has a normal mood and affect  Nursing note and vitals reviewed  BMI Counseling: Body mass index is 30 54 kg/m²  Discussed with patient's BMI with him  The BMI is above average  BMI counseling and education was provided to the patient  Nutrition recommendations include reducing portion sizes, decreasing overall calorie intake, 3-5 servings of fruits/vegetables daily, reducing fast food intake, consuming healthier snacks, decreasing soda and/or juice intake, moderation in carbohydrate intake, increasing intake of lean protein, reducing intake of saturated fat and trans fat and reducing intake of cholesterol

## 2018-11-23 NOTE — ASSESSMENT & PLAN NOTE
Blood pressure is elevated  Recommended to d/c HCTZ 25 mcg daily/  Will start Benazepril/HCTZ 20/12 5 mg one tablet daily  Follow a low-sodium diet  Monitor blood pressure at home  Check BMP in 10 -14 days  Will re-evaluate in 2 weeks

## 2018-12-06 ENCOUNTER — APPOINTMENT (OUTPATIENT)
Dept: LAB | Facility: HOSPITAL | Age: 43
End: 2018-12-06
Attending: INTERNAL MEDICINE
Payer: COMMERCIAL

## 2018-12-06 ENCOUNTER — OFFICE VISIT (OUTPATIENT)
Dept: FAMILY MEDICINE CLINIC | Facility: CLINIC | Age: 43
End: 2018-12-06
Payer: COMMERCIAL

## 2018-12-06 VITALS
RESPIRATION RATE: 16 BRPM | TEMPERATURE: 98.5 F | HEART RATE: 90 BPM | SYSTOLIC BLOOD PRESSURE: 124 MMHG | BODY MASS INDEX: 30.77 KG/M2 | OXYGEN SATURATION: 95 % | WEIGHT: 211.4 LBS | DIASTOLIC BLOOD PRESSURE: 84 MMHG

## 2018-12-06 DIAGNOSIS — C92.41 ACUTE PROMYELOCYTIC LEUKEMIA IN REMISSION (HCC): ICD-10-CM

## 2018-12-06 DIAGNOSIS — I10 BENIGN ESSENTIAL HYPERTENSION: ICD-10-CM

## 2018-12-06 DIAGNOSIS — J45.30 MILD PERSISTENT ASTHMA WITHOUT COMPLICATION: ICD-10-CM

## 2018-12-06 DIAGNOSIS — Z13.1 SCREENING FOR DIABETES MELLITUS: ICD-10-CM

## 2018-12-06 DIAGNOSIS — E66.9 MILD OBESITY: ICD-10-CM

## 2018-12-06 DIAGNOSIS — I10 BENIGN ESSENTIAL HYPERTENSION: Primary | ICD-10-CM

## 2018-12-06 LAB
ALBUMIN SERPL BCP-MCNC: 3.5 G/DL (ref 3.5–5)
ALP SERPL-CCNC: 70 U/L (ref 46–116)
ALT SERPL W P-5'-P-CCNC: 44 U/L (ref 12–78)
ANION GAP SERPL CALCULATED.3IONS-SCNC: 4 MMOL/L (ref 4–13)
AST SERPL W P-5'-P-CCNC: 24 U/L (ref 5–45)
BASOPHILS # BLD AUTO: 0.05 THOUSANDS/ΜL (ref 0–0.1)
BASOPHILS NFR BLD AUTO: 1 % (ref 0–1)
BILIRUB SERPL-MCNC: 0.86 MG/DL (ref 0.2–1)
BUN SERPL-MCNC: 21 MG/DL (ref 5–25)
CALCIUM SERPL-MCNC: 8.8 MG/DL (ref 8.3–10.1)
CHLORIDE SERPL-SCNC: 105 MMOL/L (ref 100–108)
CO2 SERPL-SCNC: 30 MMOL/L (ref 21–32)
CREAT SERPL-MCNC: 0.88 MG/DL (ref 0.6–1.3)
EOSINOPHIL # BLD AUTO: 0.22 THOUSAND/ΜL (ref 0–0.61)
EOSINOPHIL NFR BLD AUTO: 4 % (ref 0–6)
ERYTHROCYTE [DISTWIDTH] IN BLOOD BY AUTOMATED COUNT: 11.9 % (ref 11.6–15.1)
GFR SERPL CREATININE-BSD FRML MDRD: 104 ML/MIN/1.73SQ M
GLUCOSE P FAST SERPL-MCNC: 91 MG/DL (ref 65–99)
HCT VFR BLD AUTO: 44.8 % (ref 36.5–49.3)
HGB BLD-MCNC: 15.1 G/DL (ref 12–17)
IMM GRANULOCYTES # BLD AUTO: 0.02 THOUSAND/UL (ref 0–0.2)
IMM GRANULOCYTES NFR BLD AUTO: 0 % (ref 0–2)
LYMPHOCYTES # BLD AUTO: 1.52 THOUSANDS/ΜL (ref 0.6–4.47)
LYMPHOCYTES NFR BLD AUTO: 25 % (ref 14–44)
MCH RBC QN AUTO: 31.7 PG (ref 26.8–34.3)
MCHC RBC AUTO-ENTMCNC: 33.7 G/DL (ref 31.4–37.4)
MCV RBC AUTO: 94 FL (ref 82–98)
MONOCYTES # BLD AUTO: 0.47 THOUSAND/ΜL (ref 0.17–1.22)
MONOCYTES NFR BLD AUTO: 8 % (ref 4–12)
NEUTROPHILS # BLD AUTO: 3.92 THOUSANDS/ΜL (ref 1.85–7.62)
NEUTS SEG NFR BLD AUTO: 62 % (ref 43–75)
NRBC BLD AUTO-RTO: 0 /100 WBCS
PLATELET # BLD AUTO: 207 THOUSANDS/UL (ref 149–390)
PMV BLD AUTO: 11 FL (ref 8.9–12.7)
POTASSIUM SERPL-SCNC: 3.6 MMOL/L (ref 3.5–5.3)
PROT SERPL-MCNC: 6.9 G/DL (ref 6.4–8.2)
RBC # BLD AUTO: 4.77 MILLION/UL (ref 3.88–5.62)
SODIUM SERPL-SCNC: 139 MMOL/L (ref 136–145)
WBC # BLD AUTO: 6.2 THOUSAND/UL (ref 4.31–10.16)

## 2018-12-06 PROCEDURE — 80053 COMPREHEN METABOLIC PANEL: CPT

## 2018-12-06 PROCEDURE — 36415 COLL VENOUS BLD VENIPUNCTURE: CPT

## 2018-12-06 PROCEDURE — 81315 PML/RARALPHA COM BREAKPOINTS: CPT

## 2018-12-06 PROCEDURE — 3074F SYST BP LT 130 MM HG: CPT | Performed by: FAMILY MEDICINE

## 2018-12-06 PROCEDURE — 85025 COMPLETE CBC W/AUTO DIFF WBC: CPT

## 2018-12-06 PROCEDURE — 99213 OFFICE O/P EST LOW 20 MIN: CPT | Performed by: FAMILY MEDICINE

## 2018-12-06 PROCEDURE — 3079F DIAST BP 80-89 MM HG: CPT | Performed by: FAMILY MEDICINE

## 2018-12-06 RX ORDER — FLUTICASONE FUROATE AND VILANTEROL 200; 25 UG/1; UG/1
1 POWDER RESPIRATORY (INHALATION) DAILY
Qty: 2 INHALER | Refills: 0 | Status: SHIPPED | COMMUNITY
Start: 2018-12-06 | End: 2019-12-03 | Stop reason: CLARIF

## 2018-12-07 NOTE — PROGRESS NOTES
Chief Complaint   Patient presents with    Follow-up     2 week follow up     Health Maintenance   Topic Date Due    DTaP,Tdap,and Td Vaccines (1 - Tdap) 07/23/1995    Pneumococcal PPSV23 Highest Risk Adult (2 of 3 - PCV13) 10/15/2016    INFLUENZA VACCINE  12/06/2019 (Originally 7/1/2018)    Depression Screening PHQ  11/08/2019     Assessment/Plan:    Benign essential hypertension  BP improved since last visit  Continue Benazepril / HCTZ 20/12 5 mg daily  Follow a low sodium diet  Mild persistent asthma without complication  Patient continues with dry cough  Will change Ellipta 100/25 mcg to Ellipta 200/25 mcg 1 puff daily  Samples given to patient  Use Ventolin HFA inhaler PRN  Patient is in the process to schedule appointment with allergist Dr Simeon Lomax  Will need to check PFT's  Recommended to use nasal steroid spray for postnasal drip which likely  triggering his cough  Mild obesity  Encouraged regular exercise, weight reduction  Schedule follow-up office visit in 6 months  Check labs prior to next visit  Diagnoses and all orders for this visit:    Benign essential hypertension  -     Comprehensive metabolic panel; Future  -     Lipid panel; Future    Mild persistent asthma without complication  -     fluticasone-vilanterol (BREO ELLIPTA) 200-25 MCG/INH inhaler; Inhale 1 puff daily Rinse mouth after use  Mild obesity    Screening for diabetes mellitus  -     Hemoglobin A1C; Future          Subjective:      Patient ID: Isabel Soler is a 39 y o  male  HPI     Patient presents for 2 week follow-up visit for HTN,  asthma  He was seen in the office on 11/23/18  Bood pressure was elevated at 138/98  Patient was started on Benazepril /HCTZ 20/12 5 mg daily  Blood pressure has improved  Patient has been checking blood pressure at home  BP ranges 124/73 -144/80  Denies chest pain, shortness of breath, dizziness  Patient still c/o dry cough    He uses Breo Ellipta 100/25 mcg 1 puff daily  Denies chest tightness or wheezing  Patient is in the process to schedule appointment with allergist Dr Anila Mckeon and check PFT's  Blood work done this morning  Potassium 3 6, sodium 139, fasting blood sugar 91, LFTs - within normal limits  Denies tobacco use  The following portions of the patient's history were reviewed and updated as appropriate: allergies, past family history, past medical history, past social history, past surgical history and problem list     Review of Systems   Constitutional: Negative for activity change, appetite change, chills, fatigue and fever  HENT: Positive for postnasal drip  Negative for congestion, ear pain, nosebleeds, sore throat and trouble swallowing  Eyes: Negative for pain, discharge, redness, itching and visual disturbance  Respiratory: Positive for cough  Negative for chest tightness, shortness of breath and wheezing  Choking: dry cough  Cardiovascular: Negative for chest pain, palpitations and leg swelling  Gastrointestinal: Negative for abdominal pain, constipation, diarrhea, nausea and vomiting  Genitourinary: Negative for difficulty urinating, dysuria, flank pain, frequency and hematuria  Musculoskeletal: Negative for arthralgias, joint swelling and myalgias  Skin: Negative for rash and wound  Neurological: Negative for dizziness and headaches  Hematological: Negative  Psychiatric/Behavioral: Negative  Objective:      /84 (BP Location: Left arm, Patient Position: Sitting, Cuff Size: Standard)   Pulse 90   Temp 98 5 °F (36 9 °C) (Tympanic)   Resp 16   Wt 95 9 kg (211 lb 6 4 oz)   SpO2 95%   BMI 30 77 kg/m²          Physical Exam   Constitutional: He appears well-developed and well-nourished  Obese   HENT:   Head: Normocephalic and atraumatic     Right Ear: External ear normal    Left Ear: External ear normal    Mouth/Throat: Oropharynx is clear and moist    Eyes: Pupils are equal, round, and reactive to light  Conjunctivae are normal    Cardiovascular: Normal rate, regular rhythm and normal heart sounds  No murmur heard  No BL LE edema   Pulmonary/Chest: Effort normal and breath sounds normal  He has no wheezes  He has no rales  Abdominal: Soft  Bowel sounds are normal  There is no tenderness  Musculoskeletal: Normal range of motion  He exhibits no edema, tenderness or deformity  Skin: Skin is warm and dry  No rash noted  Psychiatric: He has a normal mood and affect  Nursing note and vitals reviewed

## 2018-12-07 NOTE — ASSESSMENT & PLAN NOTE
BP improved since last visit  Continue Benazepril / HCTZ 20/12 5 mg daily  Follow a low sodium diet

## 2018-12-07 NOTE — ASSESSMENT & PLAN NOTE
Patient continues with dry cough  Will change Ellipta 100/25 mcg to Ellipta 200/25 mcg 1 puff daily  Samples given to patient  Use Ventolin HFA inhaler PRN  Patient is in the process to schedule appointment with allergist Dr Imani Harrison  Will need to check PFT's  Recommended to use nasal steroid spray for postnasal drip which likely  triggering his cough

## 2018-12-10 LAB — MISCELLANEOUS LAB TEST RESULT: NORMAL

## 2019-01-07 ENCOUNTER — OFFICE VISIT (OUTPATIENT)
Dept: HEMATOLOGY ONCOLOGY | Facility: CLINIC | Age: 44
End: 2019-01-07
Payer: COMMERCIAL

## 2019-01-07 VITALS
SYSTOLIC BLOOD PRESSURE: 130 MMHG | HEART RATE: 95 BPM | HEIGHT: 70 IN | WEIGHT: 213.4 LBS | BODY MASS INDEX: 30.55 KG/M2 | DIASTOLIC BLOOD PRESSURE: 76 MMHG | RESPIRATION RATE: 17 BRPM | TEMPERATURE: 98.1 F | OXYGEN SATURATION: 97 %

## 2019-01-07 DIAGNOSIS — C92.41 ACUTE PROMYELOCYTIC LEUKEMIA IN REMISSION (HCC): Primary | ICD-10-CM

## 2019-01-07 PROCEDURE — 99215 OFFICE O/P EST HI 40 MIN: CPT | Performed by: PHYSICIAN ASSISTANT

## 2019-01-07 NOTE — PROGRESS NOTES
[address] [place]  [address] [place] [address] [id number] [phone]  Progress Note  Peter Bonner, 1975, [id number]  1/7/2019    Assessment/Plan:  1  Acute promyelocytic leukemia in remission [facility])  Patient feels well and clinically there are no troubling signs  Patient's PCR for PML- Radha is undetectable  I discussed with the patient routine health maintenance the past which includes colonoscopy at the age of 38 as well as a PSA screening based upon symptoms  Patient will continue to follow up with PCP at regular intervals for hypertension, etc     Patient understands to call our office with any questions or concerns the future  The sample blood work with placed for follow-up in 6 months       - CBC and differential; Future  - Comprehensive metabolic panel; Future  - MISCELLANEOUS LAB TEST; Future    Labs and imaging for follow up:  Orders Placed This Encounter   Procedures    CBC and differential     This is a patient instruction: This test is non-fasting  Please drink two glasses of water morning of bloodwork  Standing Status:   Future     Standing Expiration Date:   1/7/2020    Comprehensive metabolic panel     This is a patient instruction: Patient fasting for 8 hours or longer recommended  Standing Status:   Future     Standing Expiration Date:   1/7/2020    MISCELLANEOUS LAB TEST     Standing Status:   Future     Standing Expiration Date:   1/7/2020     Order Specific Question:   What is the name of the test you wish to perform? Answer:   Quantitative RT-PCR  for PML - Radha     The patient is scheduled for follow-up in approximately 6 months with Dr Chris Kendrick  Patient voiced agreement and understanding to the above  Patient knows to call the Hematology/Oncology office with any questions and concerns regarding the above  Carefully review your medication list in verify the list is accurate and up-to-date    Please call the hematologic/Oncology office if there medications missing from the less, medications on the list that your not currently taking or if there is a dosage or instruction that is different from higher taking medication  I have spent 20 minutes with Patient  today in which greater than 50% of this time was spent in counseling/coordination of care regarding Intructions for management and Impressions  The patient's current treatment goals are continued remission  Barrier(s) to care identified  None  The patient is able to self care     -------------------------------------------------------------------------------------------------------  Chief complaint:   Chief Complaint   Patient presents with    Follow-up     History of present illness/Cancer History:      Acute promyelocytic leukemia in remission ([facility] [address] )    6/9/2015 Initial Diagnosis     Acute promyelocytic leukemia in remission [facility])  Patient presented to the hospital with bruising going on for a week or more  White blood cell count 51 9, hemoglobin 11 7, platelet count 17, 16% blasts  Fibrinogen 73, INR 1 96, PTT 31           6/10/2015 Biopsy     Flow cytometry was consistent with a diagnosis of acute promyelocytic leukemia  6/16/2015 -  Chemotherapy     Patient was started on ATRA 45 mg per meter squared in divided doses  Idarubicin was started 6/16/15 and administered 12 mg per meter squared days +2, +4, +6, pulse 8  On day #9 arsenic trioxide 0 15 mg per kilogram was initiated and continued daily  Treatment was complicated by stomatitis, pulmonary infiltrate (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 syndrome)  He was treated with a variety of antibiotics including acyclovir Mycafungin, cefuroxime  He was treated with allopurinol and potassium  8/24/2015 - 2/2016 Chemotherapy     ATRA 2 weeks on, 2 weeks off,   Trisenox 4 weeks on, 4 weeks off           3/10/2016 Remission             Interval history:     No major changes to health    Patient states that his activity level the same as it was before  The patient notes that he has a horrible diet  Patient drinks only soda  Review of Systems   Constitutional: Negative for activity change, appetite change, fatigue and fever  HENT: Negative for nosebleeds  Respiratory: Negative for cough, choking and shortness of breath  Cardiovascular: Negative for chest pain, palpitations and leg swelling  Gastrointestinal: Negative for abdominal distention, abdominal pain, anal bleeding, blood in stool, constipation, diarrhea, nausea and vomiting  Endocrine: Negative for cold intolerance  Genitourinary: Negative for hematuria  Musculoskeletal: Negative for myalgias  Skin: Negative for color change, pallor and rash  Allergic/Immunologic: Negative for immunocompromised state  Neurological: Negative for headaches  Hematological: Negative for adenopathy  Does not bruise/bleed easily  All other systems reviewed and are negative  Current Outpatient Prescriptions:     albuterol (VENTOLIN HFA) 90 mcg/act inhaler, Inhale 2 puffs every 6 (six) hours as needed for wheezing, Disp: 18 g, Rfl: 0    benazepril-hydrochlorthiazide (LOTENSIN HCT) 20-12 5 MG per tablet, Take 1 tablet by mouth daily, Disp: 30 tablet, Rfl: 3    fluticasone-vilanterol (BREO ELLIPTA) 200-25 MCG/INH inhaler, Inhale 1 puff daily Rinse mouth after use , Disp: 2 Inhaler, Rfl: 0    mometasone (NASONEX) 50 mcg/act nasal spray, 2 sprays into each nostril daily, Disp: 17 g, Rfl: 0      No current facility-administered medications for this visit        Allergies   Allergen Reactions    Penicillins Hives     Objective:   /76 (BP Location: Right arm, Patient Position: Sitting)   Pulse 95   Temp 98 1 °F (36 7 °C) (Tympanic)   Resp 17   Ht 5' 9 8" (1 773 m)   Wt 96 8 kg (213 lb 6 4 oz)   SpO2 97%   BMI 30 80 kg/m²   Wt Readings from Last 3 Encounters:   01/07/19 96 8 kg (213 lb 6 4 oz)   12/06/18 95 9 kg (211 lb 6 4 oz) 11/23/18 95 2 kg (209 lb 12 8 oz)     Physical Exam   Constitutional: He is oriented to person, place, and time  He appears well-developed and well-nourished  No distress  HENT:   Head: Normocephalic and atraumatic  Mouth/Throat: No oropharyngeal exudate  Poor dentation   Eyes: Pupils are equal, round, and reactive to light  Conjunctivae and EOM are normal  No scleral icterus  Neck: Normal range of motion  Cardiovascular: Normal rate and regular rhythm  No murmur heard  Pulmonary/Chest: Effort normal and breath sounds normal  No respiratory distress  Abdominal: Soft  Bowel sounds are normal  He exhibits no distension  There is no hepatosplenomegaly  There is no tenderness  Musculoskeletal: He exhibits no edema or tenderness  Lymphadenopathy:     He has no cervical adenopathy  Neurological: He is alert and oriented to person, place, and time  No cranial nerve deficit  Skin: No rash noted  No erythema  No pallor  Pertinent Laboratory Results and Imaging Review:  No visits with results within 1 Month(s) from this visit  Latest known visit with results is:   Appointment on 12/06/2018   Component Date Value Ref Range Status    WBC 12/06/2018 6 20  4  31 - 10 16 Thousand/uL Final    RBC 12/06/2018 4 77  3 88 - 5 62 Million/uL Final    Hemoglobin 12/06/2018 15 1  12 0 - 17 0 g/dL Final    Hematocrit 12/06/2018 44 8  36 5 - 49 3 % Final    MCV 12/06/2018 94  82 - 98 fL Final    MCH 12/06/2018 31 7  26 8 - 34 3 pg Final    MCHC 12/06/2018 33 7  31 4 - 37 4 g/dL Final    RDW 12/06/2018 11 9  11 6 - 15 1 % Final    MPV 12/06/2018 11 0  8 9 - 12 7 fL Final    Platelets 21/57/0490 207  149 - 390 Thousands/uL Final    nRBC 12/06/2018 0  /100 WBCs Final    Neutrophils Relative 12/06/2018 62  43 - 75 % Final    Immat GRANS % 12/06/2018 0  0 - 2 % Final    Lymphocytes Relative 12/06/2018 25  14 - 44 % Final    Monocytes Relative 12/06/2018 8  4 - 12 % Final    Eosinophils Relative 12/06/2018 4  0 - 6 % Final    Basophils Relative 12/06/2018 1  0 - 1 % Final    Neutrophils Absolute 12/06/2018 3 92  1 85 - 7 62 Thousands/µL Final    Immature Grans Absolute 12/06/2018 0 02  0 00 - 0 20 Thousand/uL Final    Lymphocytes Absolute 12/06/2018 1 52  0 60 - 4 47 Thousands/µL Final    Monocytes Absolute 12/06/2018 0 47  0 17 - 1 22 Thousand/µL Final    Eosinophils Absolute 12/06/2018 0 22  0 00 - 0 61 Thousand/µL Final    Basophils Absolute 12/06/2018 0 05  0 00 - 0 10 Thousands/µL Final    Sodium 12/06/2018 139  136 - 145 mmol/L Final    Potassium 12/06/2018 3 6  3 5 - 5 3 mmol/L Final    Chloride 12/06/2018 105  100 - 108 mmol/L Final    CO2 12/06/2018 30  21 - 32 mmol/L Final    ANION GAP 12/06/2018 4  4 - 13 mmol/L Final    BUN 12/06/2018 21  5 - 25 mg/dL Final    Creatinine 12/06/2018 0 88  0 60 - 1 30 mg/dL Final    Standardized to IDMS reference method    Glucose, Fasting 12/06/2018 91  65 - 99 mg/dL Final      Specimen collection should occur prior to Sulfasalazine administration due to the potential for falsely depressed results  Specimen collection should occur prior to Sulfapyridine administration due to the potential for falsely elevated results   Calcium 12/06/2018 8 8  8 3 - 10 1 mg/dL Final    AST 12/06/2018 24  5 - 45 U/L Final      Specimen collection should occur prior to Sulfasalazine administration due to the potential for falsely depressed results   ALT 12/06/2018 44  12 - 78 U/L Final      Specimen collection should occur prior to Sulfasalazine and/or Sulfapyridine administration due to the potential for falsely depressed results       Alkaline Phosphatase 12/06/2018 70  46 - 116 U/L Final    Total Protein 12/06/2018 6 9  6 4 - 8 2 g/dL Final    Albumin 12/06/2018 3 5  3 5 - 5 0 g/dL Final    Total Bilirubin 12/06/2018 0 86  0 20 - 1 00 mg/dL Final    eGFR 12/06/2018 104  ml/min/1 73sq m Final    Miscellaneous Lab Test Result 12/06/2018 See Attached Report   Final       The following historical data was reviewed  Past Medical History:   Diagnosis Date    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NAKUL M3) ([facility] [address] )     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HCC)     Allergic rhinitis     Asthma     Essential hypertension, benign     [facility]     Obesity     Personal history of other diseases of respiratory system        Past Surgical History:   Procedure Laterality Date    BONE MARROW BIOPSY W/ ASPIRATION N/A     PORTACATH PLACEMENT Right     WISDOM TOOTH EXTRACTION         Social History     Social History    Marital status: /Civil Union     Spouse name: N/A    Number of children: N/A    Years of education: N/A     Occupational History     [employer]     Social History Main Topics    Smoking status: Never Smoker    Smokeless tobacco: Never Used    Alcohol use No    Drug use: No    Sexual activity: Not on file     Other Topics Concern    Not on file     Social History Narrative    No narrative on file       Family History   Problem Relation Age of Onset    Hypertension Father        Please note: This report has been generated by a voice recognition software system  Therefore there may be syntax, spelling, and/or grammatical errors  Please call if you have any questions

## 2019-03-27 DIAGNOSIS — I10 BENIGN ESSENTIAL HYPERTENSION: ICD-10-CM

## 2019-03-27 RX ORDER — BENAZEPRIL HYDROCHLORIDE AND HYDROCHLOROTHIAZIDE 20; 12.5 MG/1; MG/1
TABLET ORAL
Qty: 30 TABLET | Refills: 6 | Status: SHIPPED | OUTPATIENT
Start: 2019-03-27 | End: 2019-05-02 | Stop reason: ALTCHOICE

## 2019-05-01 ENCOUNTER — TELEPHONE (OUTPATIENT)
Dept: FAMILY MEDICINE CLINIC | Facility: CLINIC | Age: 44
End: 2019-05-01

## 2019-05-02 DIAGNOSIS — I10 BENIGN ESSENTIAL HYPERTENSION: Primary | ICD-10-CM

## 2019-05-02 RX ORDER — HYDROCHLOROTHIAZIDE 12.5 MG/1
12.5 TABLET ORAL DAILY
Qty: 30 TABLET | Refills: 6 | Status: SHIPPED | OUTPATIENT
Start: 2019-05-02 | End: 2019-11-14 | Stop reason: SDUPTHER

## 2019-05-02 RX ORDER — BENAZEPRIL HYDROCHLORIDE 20 MG/1
20 TABLET ORAL DAILY
Qty: 30 TABLET | Refills: 6 | Status: SHIPPED | OUTPATIENT
Start: 2019-05-02 | End: 2019-11-14 | Stop reason: SDUPTHER

## 2019-05-30 ENCOUNTER — APPOINTMENT (OUTPATIENT)
Dept: LAB | Facility: HOSPITAL | Age: 44
End: 2019-05-30
Payer: COMMERCIAL

## 2019-05-30 DIAGNOSIS — I10 BENIGN ESSENTIAL HYPERTENSION: ICD-10-CM

## 2019-05-30 DIAGNOSIS — Z13.1 SCREENING FOR DIABETES MELLITUS: ICD-10-CM

## 2019-05-30 DIAGNOSIS — C92.41 ACUTE PROMYELOCYTIC LEUKEMIA IN REMISSION (HCC): ICD-10-CM

## 2019-05-30 LAB
ALBUMIN SERPL BCP-MCNC: 3.7 G/DL (ref 3.5–5)
ALP SERPL-CCNC: 72 U/L (ref 46–116)
ALT SERPL W P-5'-P-CCNC: 42 U/L (ref 12–78)
ANION GAP SERPL CALCULATED.3IONS-SCNC: 7 MMOL/L (ref 4–13)
AST SERPL W P-5'-P-CCNC: 22 U/L (ref 5–45)
BASOPHILS # BLD AUTO: 0.04 THOUSANDS/ΜL (ref 0–0.1)
BASOPHILS NFR BLD AUTO: 1 % (ref 0–1)
BILIRUB SERPL-MCNC: 0.51 MG/DL (ref 0.2–1)
BUN SERPL-MCNC: 20 MG/DL (ref 5–25)
CALCIUM SERPL-MCNC: 8 MG/DL (ref 8.3–10.1)
CHLORIDE SERPL-SCNC: 106 MMOL/L (ref 100–108)
CHOLEST SERPL-MCNC: 133 MG/DL (ref 50–200)
CO2 SERPL-SCNC: 27 MMOL/L (ref 21–32)
CREAT SERPL-MCNC: 0.97 MG/DL (ref 0.6–1.3)
EOSINOPHIL # BLD AUTO: 0.36 THOUSAND/ΜL (ref 0–0.61)
EOSINOPHIL NFR BLD AUTO: 6 % (ref 0–6)
ERYTHROCYTE [DISTWIDTH] IN BLOOD BY AUTOMATED COUNT: 12.3 % (ref 11.6–15.1)
EST. AVERAGE GLUCOSE BLD GHB EST-MCNC: 108 MG/DL
GFR SERPL CREATININE-BSD FRML MDRD: 95 ML/MIN/1.73SQ M
GLUCOSE P FAST SERPL-MCNC: 89 MG/DL (ref 65–99)
HBA1C MFR BLD: 5.4 % (ref 4.2–6.3)
HCT VFR BLD AUTO: 45.5 % (ref 36.5–49.3)
HDLC SERPL-MCNC: 25 MG/DL (ref 40–60)
HGB BLD-MCNC: 15.5 G/DL (ref 12–17)
IMM GRANULOCYTES # BLD AUTO: 0.02 THOUSAND/UL (ref 0–0.2)
IMM GRANULOCYTES NFR BLD AUTO: 0 % (ref 0–2)
LDLC SERPL CALC-MCNC: 65 MG/DL (ref 0–100)
LYMPHOCYTES # BLD AUTO: 1.53 THOUSANDS/ΜL (ref 0.6–4.47)
LYMPHOCYTES NFR BLD AUTO: 24 % (ref 14–44)
MCH RBC QN AUTO: 31.7 PG (ref 26.8–34.3)
MCHC RBC AUTO-ENTMCNC: 34.1 G/DL (ref 31.4–37.4)
MCV RBC AUTO: 93 FL (ref 82–98)
MONOCYTES # BLD AUTO: 0.57 THOUSAND/ΜL (ref 0.17–1.22)
MONOCYTES NFR BLD AUTO: 9 % (ref 4–12)
NEUTROPHILS # BLD AUTO: 3.92 THOUSANDS/ΜL (ref 1.85–7.62)
NEUTS SEG NFR BLD AUTO: 60 % (ref 43–75)
NONHDLC SERPL-MCNC: 108 MG/DL
NRBC BLD AUTO-RTO: 0 /100 WBCS
PLATELET # BLD AUTO: 200 THOUSANDS/UL (ref 149–390)
PMV BLD AUTO: 10.9 FL (ref 8.9–12.7)
POTASSIUM SERPL-SCNC: 3.6 MMOL/L (ref 3.5–5.3)
PROT SERPL-MCNC: 7 G/DL (ref 6.4–8.2)
RBC # BLD AUTO: 4.89 MILLION/UL (ref 3.88–5.62)
SODIUM SERPL-SCNC: 140 MMOL/L (ref 136–145)
TRIGL SERPL-MCNC: 215 MG/DL
WBC # BLD AUTO: 6.44 THOUSAND/UL (ref 4.31–10.16)

## 2019-05-30 PROCEDURE — 80053 COMPREHEN METABOLIC PANEL: CPT

## 2019-05-30 PROCEDURE — 85025 COMPLETE CBC W/AUTO DIFF WBC: CPT

## 2019-05-30 PROCEDURE — 83036 HEMOGLOBIN GLYCOSYLATED A1C: CPT

## 2019-05-30 PROCEDURE — 36415 COLL VENOUS BLD VENIPUNCTURE: CPT

## 2019-05-30 PROCEDURE — 81315 PML/RARALPHA COM BREAKPOINTS: CPT

## 2019-05-30 PROCEDURE — 80061 LIPID PANEL: CPT

## 2019-06-03 LAB — MISCELLANEOUS LAB TEST RESULT: NORMAL

## 2019-06-10 ENCOUNTER — OFFICE VISIT (OUTPATIENT)
Dept: HEMATOLOGY ONCOLOGY | Facility: CLINIC | Age: 44
End: 2019-06-10
Payer: COMMERCIAL

## 2019-06-10 VITALS
TEMPERATURE: 99.2 F | BODY MASS INDEX: 29.92 KG/M2 | HEART RATE: 104 BPM | SYSTOLIC BLOOD PRESSURE: 126 MMHG | HEIGHT: 70 IN | DIASTOLIC BLOOD PRESSURE: 74 MMHG | RESPIRATION RATE: 16 BRPM | WEIGHT: 209 LBS

## 2019-06-10 DIAGNOSIS — C92.41 ACUTE PROMYELOCYTIC LEUKEMIA IN REMISSION (HCC): Primary | ICD-10-CM

## 2019-06-10 PROCEDURE — 99213 OFFICE O/P EST LOW 20 MIN: CPT | Performed by: INTERNAL MEDICINE

## 2019-07-04 PROBLEM — E78.5 DYSLIPIDEMIA: Status: ACTIVE | Noted: 2019-07-04

## 2019-07-22 ENCOUNTER — OFFICE VISIT (OUTPATIENT)
Dept: FAMILY MEDICINE CLINIC | Facility: CLINIC | Age: 44
End: 2019-07-22
Payer: COMMERCIAL

## 2019-07-22 VITALS
BODY MASS INDEX: 30.32 KG/M2 | WEIGHT: 211.8 LBS | SYSTOLIC BLOOD PRESSURE: 120 MMHG | HEIGHT: 70 IN | OXYGEN SATURATION: 97 % | HEART RATE: 96 BPM | DIASTOLIC BLOOD PRESSURE: 84 MMHG | TEMPERATURE: 97.9 F | RESPIRATION RATE: 16 BRPM

## 2019-07-22 DIAGNOSIS — I10 BENIGN ESSENTIAL HYPERTENSION: Primary | ICD-10-CM

## 2019-07-22 DIAGNOSIS — E66.9 MILD OBESITY: ICD-10-CM

## 2019-07-22 DIAGNOSIS — J45.30 MILD PERSISTENT ASTHMA WITHOUT COMPLICATION: ICD-10-CM

## 2019-07-22 DIAGNOSIS — E78.5 DYSLIPIDEMIA: ICD-10-CM

## 2019-07-22 DIAGNOSIS — C92.41 ACUTE PROMYELOCYTIC LEUKEMIA IN REMISSION (HCC): ICD-10-CM

## 2019-07-22 PROCEDURE — 3074F SYST BP LT 130 MM HG: CPT | Performed by: FAMILY MEDICINE

## 2019-07-22 PROCEDURE — 99213 OFFICE O/P EST LOW 20 MIN: CPT | Performed by: FAMILY MEDICINE

## 2019-07-22 PROCEDURE — 3008F BODY MASS INDEX DOCD: CPT | Performed by: FAMILY MEDICINE

## 2019-07-22 PROCEDURE — 1036F TOBACCO NON-USER: CPT | Performed by: FAMILY MEDICINE

## 2019-07-22 NOTE — PROGRESS NOTES
Chief Complaint   Patient presents with    Follow-up     6 months  Health Maintenance   Topic Date Due    DTaP,Tdap,and Td Vaccines (1 - Tdap) 07/23/1995    Pneumococcal Vaccine: Pediatrics (0 to 5 Years) and At-Risk Patients (6 to 59 Years) (2 of 3 - PCV13) 10/15/2016    INFLUENZA VACCINE  12/06/2019 (Originally 7/1/2019)    Depression Screening PHQ  11/08/2019    BMI: Followup Plan  11/23/2019    BMI: Adult  06/10/2020    Pneumococcal Vaccine: 65+ Years (1 of 2 - PCV13) 07/23/2039    HEPATITIS B VACCINES  Aged Out     BMI Counseling: Body mass index is 30 39 kg/m²  Discussed the patient's BMI with him  The BMI is above average  BMI counseling and education was provided to the patient  Nutrition recommendations include reducing portion sizes, decreasing overall calorie intake, 3-5 servings of fruits/vegetables daily, reducing fast food intake, consuming healthier snacks, decreasing soda and/or juice intake, moderation in carbohydrate intake, increasing intake of lean protein, reducing intake of saturated fat and trans fat and reducing intake of cholesterol  Assessment/Plan:    Benign essential hypertension  BP is stable  Continue Benazepril 20 mg daily, HCTZ 12 5 mg daily  Dyslipidemia  Discussed a low fat diet with patient  Recommended to increase exercise  Goal for triglycerides < 150, HDL > 40  Will recheck lipid panel in 6 months  Mild obesity  Discussed dietary and lifestyle modifications with patient  Encouraged regular exercise, weight reduction  Mild persistent asthma without complication  Patient reports improvement in symptoms since last visit  He stopped using Breo Ellipta  Use Ventolin HFA inhaler PRN  Acute promyelocytic leukemia in remission  Currently in remission  Follow- up with hematology- oncology Dr Bishnu Toscano every 6 month  HM: recommended Tdap vaccination  Patient will check with insurance regarding coverage        Schedule follow-up office visit in 10 months  Check labs prior to next visit  Diagnoses and all orders for this visit:    Benign essential hypertension  -     Comprehensive metabolic panel; Future    Dyslipidemia  -     Lipid panel; Future    Mild obesity    Mild persistent asthma without complication    Acute promyelocytic leukemia in remission (HCC)          Subjective:      Patient ID: Kemi Ravi is a 39 y o  male  HPI     Patient presents for 6 month follow-up office visit  PMHx: HTN, mild intermittent asthma,  seasonal allergies, obesity, acute promyelocytic leukemia, currently in remission  Reviewed current medications, last blood test results from May 2019  CBC with dif -within normal range  Fasting blood sugar 89, creatinine 0 97, potassium 3 6 cholesterol 133, triglycerides 215, HDL 25, LDL 65,   Hb A1c 5 4  HTN - patient takes Benazepril 20 mg daily, HCTZ 12 5 mg daily  Denies chest pain, shortness breath, dizziness  Patient tries to exercise at the gym 1-2 times per week, walks  Weight has been stable  Recently was on vacation and has not been following a strict diet  Mild intermittent asthma - patient did not schedule appointment with allergist   Dr Ji Bella  He reports improvement in symptoms  Stopped using Breo Ellipta  No recent asthma exacerbations  Acute promyelocytic leukemia - management per hematology- oncology Dr Fredis Castillo, was last seen in June 2019  Currently in remission  Patient will follow with Dr Fredis Castillo in 6 months  Denies tobacco use  The following portions of the patient's history were reviewed and updated as appropriate: allergies, current medications, past family history, past social history, past surgical history and problem list     Review of Systems   Constitutional: Negative for activity change, appetite change, chills, fatigue and fever     HENT: Negative for congestion, dental problem, ear pain, hearing loss, mouth sores, nosebleeds, sore throat, tinnitus and trouble swallowing  Eyes: Negative for pain, discharge, redness, itching and visual disturbance  Respiratory: Negative for cough, chest tightness, shortness of breath and wheezing  Cardiovascular: Negative for chest pain, palpitations and leg swelling  Gastrointestinal: Negative for abdominal pain, blood in stool, constipation, diarrhea, nausea and vomiting  Genitourinary: Negative for difficulty urinating, dysuria, flank pain, frequency and hematuria  Musculoskeletal: Negative for arthralgias, back pain, gait problem, joint swelling, myalgias and neck pain  Skin: Negative for rash and wound  Neurological: Negative for dizziness and headaches  Hematological: Negative for adenopathy  Does not bruise/bleed easily  Psychiatric/Behavioral: Negative  Objective:      /84 (BP Location: Left arm, Patient Position: Sitting, Cuff Size: Adult)   Pulse 96   Temp 97 9 °F (36 6 °C) (Tympanic)   Resp 16   Ht 5' 10" (1 778 m)   Wt 96 1 kg (211 lb 12 8 oz)   SpO2 97%   BMI 30 39 kg/m²          Physical Exam   Constitutional: He appears well-developed and well-nourished  Mildly obese   HENT:   Head: Normocephalic and atraumatic  Right Ear: External ear normal    Left Ear: External ear normal    Mouth/Throat: Oropharynx is clear and moist    Eyes: Pupils are equal, round, and reactive to light  Conjunctivae are normal    Cardiovascular: Normal rate, regular rhythm and normal heart sounds  No murmur heard  No BL LE edema  No carotid bruits BL   Pulmonary/Chest: Effort normal and breath sounds normal    Abdominal: Soft  Bowel sounds are normal  There is no tenderness  Musculoskeletal: Normal range of motion  He exhibits no edema, tenderness or deformity  Skin: Skin is warm and dry  No rash noted  Psychiatric: He has a normal mood and affect  Nursing note and vitals reviewed

## 2019-07-23 NOTE — ASSESSMENT & PLAN NOTE
Discussed a low fat diet with patient  Recommended to increase exercise  Goal for triglycerides < 150, HDL > 40  Will recheck lipid panel in 6 months

## 2019-07-23 NOTE — ASSESSMENT & PLAN NOTE
Discussed dietary and lifestyle modifications with patient  Encouraged regular exercise, weight reduction

## 2019-07-23 NOTE — ASSESSMENT & PLAN NOTE
Patient reports improvement in symptoms since last visit  He stopped using Breo Ellipta  Use Ventolin HFA inhaler PRN

## 2019-11-14 DIAGNOSIS — I10 BENIGN ESSENTIAL HYPERTENSION: ICD-10-CM

## 2019-11-14 RX ORDER — BENAZEPRIL HYDROCHLORIDE 20 MG/1
TABLET ORAL
Qty: 90 TABLET | Refills: 2 | Status: SHIPPED | OUTPATIENT
Start: 2019-11-14 | End: 2020-11-02 | Stop reason: SDUPTHER

## 2019-11-14 RX ORDER — HYDROCHLOROTHIAZIDE 12.5 MG/1
TABLET ORAL
Qty: 90 TABLET | Refills: 2 | Status: SHIPPED | OUTPATIENT
Start: 2019-11-14 | End: 2020-11-02 | Stop reason: SDUPTHER

## 2019-11-19 ENCOUNTER — APPOINTMENT (OUTPATIENT)
Dept: LAB | Facility: HOSPITAL | Age: 44
End: 2019-11-19
Attending: INTERNAL MEDICINE
Payer: COMMERCIAL

## 2019-11-19 DIAGNOSIS — C92.41 ACUTE PROMYELOCYTIC LEUKEMIA IN REMISSION (HCC): ICD-10-CM

## 2019-11-19 PROCEDURE — 81315 PML/RARALPHA COM BREAKPOINTS: CPT

## 2019-11-25 LAB — MISCELLANEOUS LAB TEST RESULT: NORMAL

## 2019-11-30 ENCOUNTER — APPOINTMENT (OUTPATIENT)
Dept: LAB | Facility: HOSPITAL | Age: 44
End: 2019-11-30
Attending: INTERNAL MEDICINE
Payer: COMMERCIAL

## 2019-11-30 DIAGNOSIS — C92.41 ACUTE PROMYELOCYTIC LEUKEMIA IN REMISSION (HCC): ICD-10-CM

## 2019-11-30 LAB
ALBUMIN SERPL BCP-MCNC: 3.9 G/DL (ref 3.5–5)
ALP SERPL-CCNC: 73 U/L (ref 46–116)
ALT SERPL W P-5'-P-CCNC: 36 U/L (ref 12–78)
ANION GAP SERPL CALCULATED.3IONS-SCNC: 3 MMOL/L (ref 4–13)
AST SERPL W P-5'-P-CCNC: 20 U/L (ref 5–45)
BASOPHILS # BLD AUTO: 0.02 THOUSANDS/ΜL (ref 0–0.1)
BASOPHILS NFR BLD AUTO: 0 % (ref 0–1)
BILIRUB SERPL-MCNC: 0.82 MG/DL (ref 0.2–1)
BUN SERPL-MCNC: 18 MG/DL (ref 5–25)
CALCIUM SERPL-MCNC: 8.6 MG/DL (ref 8.3–10.1)
CHLORIDE SERPL-SCNC: 110 MMOL/L (ref 100–108)
CO2 SERPL-SCNC: 30 MMOL/L (ref 21–32)
CREAT SERPL-MCNC: 0.88 MG/DL (ref 0.6–1.3)
EOSINOPHIL # BLD AUTO: 0.17 THOUSAND/ΜL (ref 0–0.61)
EOSINOPHIL NFR BLD AUTO: 4 % (ref 0–6)
ERYTHROCYTE [DISTWIDTH] IN BLOOD BY AUTOMATED COUNT: 11.7 % (ref 11.6–15.1)
GFR SERPL CREATININE-BSD FRML MDRD: 104 ML/MIN/1.73SQ M
GLUCOSE P FAST SERPL-MCNC: 85 MG/DL (ref 65–99)
HCT VFR BLD AUTO: 44.4 % (ref 36.5–49.3)
HGB BLD-MCNC: 14.9 G/DL (ref 12–17)
IMM GRANULOCYTES # BLD AUTO: 0.01 THOUSAND/UL (ref 0–0.2)
IMM GRANULOCYTES NFR BLD AUTO: 0 % (ref 0–2)
LYMPHOCYTES # BLD AUTO: 1.17 THOUSANDS/ΜL (ref 0.6–4.47)
LYMPHOCYTES NFR BLD AUTO: 26 % (ref 14–44)
MCH RBC QN AUTO: 31.4 PG (ref 26.8–34.3)
MCHC RBC AUTO-ENTMCNC: 33.6 G/DL (ref 31.4–37.4)
MCV RBC AUTO: 94 FL (ref 82–98)
MONOCYTES # BLD AUTO: 0.4 THOUSAND/ΜL (ref 0.17–1.22)
MONOCYTES NFR BLD AUTO: 9 % (ref 4–12)
NEUTROPHILS # BLD AUTO: 2.69 THOUSANDS/ΜL (ref 1.85–7.62)
NEUTS SEG NFR BLD AUTO: 61 % (ref 43–75)
NRBC BLD AUTO-RTO: 0 /100 WBCS
PLATELET # BLD AUTO: 194 THOUSANDS/UL (ref 149–390)
PMV BLD AUTO: 11.4 FL (ref 8.9–12.7)
POTASSIUM SERPL-SCNC: 3.5 MMOL/L (ref 3.5–5.3)
PROT SERPL-MCNC: 6.8 G/DL (ref 6.4–8.2)
RBC # BLD AUTO: 4.74 MILLION/UL (ref 3.88–5.62)
SODIUM SERPL-SCNC: 143 MMOL/L (ref 136–145)
WBC # BLD AUTO: 4.46 THOUSAND/UL (ref 4.31–10.16)

## 2019-11-30 PROCEDURE — 80053 COMPREHEN METABOLIC PANEL: CPT

## 2019-11-30 PROCEDURE — 36415 COLL VENOUS BLD VENIPUNCTURE: CPT

## 2019-11-30 PROCEDURE — 85025 COMPLETE CBC W/AUTO DIFF WBC: CPT

## 2019-12-03 ENCOUNTER — OFFICE VISIT (OUTPATIENT)
Dept: HEMATOLOGY ONCOLOGY | Facility: CLINIC | Age: 44
End: 2019-12-03
Payer: COMMERCIAL

## 2019-12-03 VITALS
HEART RATE: 93 BPM | BODY MASS INDEX: 29.92 KG/M2 | SYSTOLIC BLOOD PRESSURE: 132 MMHG | WEIGHT: 209 LBS | RESPIRATION RATE: 17 BRPM | TEMPERATURE: 98.2 F | DIASTOLIC BLOOD PRESSURE: 74 MMHG | HEIGHT: 70 IN | OXYGEN SATURATION: 98 %

## 2019-12-03 DIAGNOSIS — C92.41 ACUTE PROMYELOCYTIC LEUKEMIA IN REMISSION (HCC): Primary | ICD-10-CM

## 2019-12-03 PROCEDURE — 99213 OFFICE O/P EST LOW 20 MIN: CPT | Performed by: INTERNAL MEDICINE

## 2019-12-03 NOTE — PROGRESS NOTES
[facility]  [address] [address] [facility] [address]  [facility] [address] [facility] [id number] [phone]    Skip Moreland,1975, [id number]  12/03/19    Discussion:   In summary, this is a 76-year-old male history of acute promyelocytic leukemia  He remains in remission  Recent molecular testing is negative  CBC and chemistry are essentially normal   He continues to function without restriction  His previous place of employment closed and he had a new job by the end of September  I discussed the above with the patient  The patient  voiced understanding and agreement   ______________________________________________________________________    Chief Complaint   Patient presents with    Follow-up       HPI:     Acute promyelocytic leukemia in remission ([facility] [address] )    6/9/2015 Initial Diagnosis     Acute promyelocytic leukemia in remission [facility])  Patient presented to the hospital with bruising going on for a week or more  White blood cell count 51 9, hemoglobin 11 7, platelet count 17, 98% blasts  Fibrinogen 73, INR 1 96, PTT 31        6/10/2015 Biopsy     Flow cytometry was consistent with a diagnosis of acute promyelocytic leukemia  6/16/2015 -  Chemotherapy     Patient was started on ATRA 45 mg per meter squared in divided doses  Idarubicin was started 6/16/15 and administered 12 mg per meter squared days +2, +4, +6, pulse 8  On day #9 arsenic trioxide 0 15 mg per kilogram was initiated and continued daily  Treatment was complicated by stomatitis, pulmonary infiltrate (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 syndrome)  He was treated with a variety of antibiotics including acyclovir Mycafungin, cefuroxime  He was treated with allopurinol and potassium  8/24/2015 - 2/2016 Chemotherapy     ATRA 2 weeks on, 2 weeks off,   Trisenox 4 weeks on, 4 weeks off        3/10/2016 Remission            Interval History:  Clinically stable    ECOG-  0 - Asymptomatic    Review of Systems   Constitutional: Negative for chills and fever  HENT: Negative for nosebleeds  Eyes: Negative for discharge  Respiratory: Negative for cough and shortness of breath  Cardiovascular: Negative for chest pain  Gastrointestinal: Negative for abdominal pain, constipation and diarrhea  Endocrine: Negative for polydipsia  Genitourinary: Negative for hematuria  Musculoskeletal: Negative for arthralgias  Skin: Negative for color change  Allergic/Immunologic: Negative for immunocompromised state  Neurological: Negative for dizziness and headaches  Hematological: Negative for adenopathy  Psychiatric/Behavioral: Negative for agitation         Past Medical History:   Diagnosis Date    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NAKUL M3) ([facility] )     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HCC)     Allergic rhinitis     Asthma     Essential hypertension, benign     Leukemia ([facility] )     Obesity     Personal history of other diseases of respiratory system      Patient Active Problem List   Diagnosis    Acute promyelocytic leukemia in remission ([facility] )    Allergic rhinitis    Benign essential hypertension    Seasonal allergies    Persistent dry cough    Other viral warts    Facial skin lesion    Bronchospasm    Mild obesity    Mild persistent asthma without complication    Dyslipidemia       Current Outpatient Medications:     benazepril (LOTENSIN) 20 mg tablet, TAKE 1 TABLET BY MOUTH EVERY DAY, Disp: 90 tablet, Rfl: 2    hydrochlorothiazide (HYDRODIURIL) 12 5 mg tablet, TAKE 1 TABLET BY MOUTH EVERY DAY, Disp: 90 tablet, Rfl: 2  Allergies   Allergen Reactions    Penicillins Hives     Past Surgical History:   Procedure Laterality Date    BONE MARROW BIOPSY W/ ASPIRATION N/A     PORTACATH PLACEMENT Right     WISDOM TOOTH EXTRACTION       Social History     Objective:  Vitals:    12/03/19 1539   BP: 132/74   BP Location: Right arm   Patient Position: Sitting   Pulse: 93   Resp: 17   Temp: 98 2 °F (36 8 °C)   TempSrc: Tympanic SpO2: 98%   Weight: 94 8 kg (209 lb)   Height: 5' 10" (1 778 m)     Physical Exam   Constitutional: He is oriented to person, place, and time  He appears well-developed  HENT:   Head: Normocephalic  Eyes: Pupils are equal, round, and reactive to light  Neck: Neck supple  Cardiovascular: Normal rate and regular rhythm  No murmur heard  Pulmonary/Chest: Breath sounds normal  He has no wheezes  He has no rales  Abdominal: Soft  There is no tenderness  Musculoskeletal: Normal range of motion  He exhibits no edema or tenderness  Lymphadenopathy:     He has no cervical adenopathy  Neurological: He is alert and oriented to person, place, and time  He has normal reflexes  No cranial nerve deficit  Skin: No rash noted  No erythema  Psychiatric: He has a normal mood and affect  His behavior is normal          Labs: I personally reviewed the labs and imaging pertinent to this patient care

## 2020-01-28 ENCOUNTER — OFFICE VISIT (OUTPATIENT)
Dept: FAMILY MEDICINE CLINIC | Facility: CLINIC | Age: 45
End: 2020-01-28
Payer: COMMERCIAL

## 2020-01-28 VITALS
RESPIRATION RATE: 14 BRPM | BODY MASS INDEX: 29.58 KG/M2 | WEIGHT: 206.6 LBS | OXYGEN SATURATION: 96 % | SYSTOLIC BLOOD PRESSURE: 120 MMHG | DIASTOLIC BLOOD PRESSURE: 80 MMHG | HEART RATE: 114 BPM | HEIGHT: 70 IN | TEMPERATURE: 99.3 F

## 2020-01-28 DIAGNOSIS — E78.5 DYSLIPIDEMIA: ICD-10-CM

## 2020-01-28 DIAGNOSIS — J06.9 ACUTE UPPER RESPIRATORY INFECTION: ICD-10-CM

## 2020-01-28 DIAGNOSIS — C92.41 ACUTE PROMYELOCYTIC LEUKEMIA IN REMISSION (HCC): ICD-10-CM

## 2020-01-28 DIAGNOSIS — J45.20 MILD INTERMITTENT ASTHMA WITHOUT COMPLICATION: ICD-10-CM

## 2020-01-28 DIAGNOSIS — I10 BENIGN ESSENTIAL HYPERTENSION: Primary | ICD-10-CM

## 2020-01-28 PROCEDURE — 99214 OFFICE O/P EST MOD 30 MIN: CPT | Performed by: FAMILY MEDICINE

## 2020-01-28 RX ORDER — ALBUTEROL SULFATE 90 UG/1
AEROSOL, METERED RESPIRATORY (INHALATION)
Qty: 1 INHALER | Refills: 3 | Status: SHIPPED | OUTPATIENT
Start: 2020-01-28

## 2020-01-28 NOTE — PROGRESS NOTES
Chief Complaint   Patient presents with    Follow-up     6 month follow up     Health Maintenance   Topic Date Due    DTaP,Tdap,and Td Vaccines (1 - Tdap) 07/23/1985    HIV Screening  07/23/1989    Annual Physical  07/23/1992    Pneumococcal Vaccine: Pediatrics (0 to 5 Years) and At-Risk Patients (6 to 59 Years) (2 of 3 - PCV13) 10/15/2016    Influenza Vaccine  01/28/2021 (Originally 7/1/2019)    BMI: Followup Plan  07/22/2020    Depression Screening PHQ  01/28/2021    BMI: Adult  01/28/2021    Pneumococcal Vaccine: 65+ Years (1 of 2 - PCV13) 07/23/2039    HIB Vaccine  Aged Out    Hepatitis B Vaccine  Aged Out    IPV Vaccine  Aged Out    Hepatitis A Vaccine  Aged Out    Meningococcal ACWY Vaccine  Aged Out    HPV Vaccine  Aged Out       Assessment/Plan:    Benign essential hypertension  BP is adequately controlled  Continue Benazepril 20 mg daily, HCTZ 12 5 mg daily  Acute promyelocytic leukemia in remission  Blood work done in 11/19 showed normal CBC with dif  Patient is currently in remission  Follow- up with hematology- oncology Dr Kathrine Elizabeth in 6 months  Dyslipidemia  Follow a low-cholesterol, low-fat diet, regular exercise  Check CMP, lipid panel in 3 months  Acute upper respiratory infection  Likely viral etiology  Recommended to increase fluid intake, take Tylenol Cold PRN, Robitussin DM pas needed for cough  Use throat lozenges  Call office if symptoms persist or worsen  Mild intermittent asthma without complication  Symptoms are stable  Patient is no longer using Breo Ellipta  Rx sent to the pharmacy for Ventolin HFA inhaler to use PRN  Schedule physical exam in 3 months  Check labs prior to next visit  Diagnoses and all orders for this visit:    Benign essential hypertension  -     Comprehensive metabolic panel; Future    Acute promyelocytic leukemia in remission ([facility] [address] )    Dyslipidemia  -     Lipid panel;  Future    Acute upper respiratory infection    Mild intermittent asthma without complication  -     albuterol (VENTOLIN HFA) 90 mcg/act inhaler; Use 2 puffs every 4-6 hr  PRN for chest tightness or wheezing          Subjective:      Patient ID: Jamari De Souza is a 38 y o  male  HPI     Patient is 34-year-old male with past medical history of HTN, mild intermittent asthma, seasonal allergies, obesity, acute promyelocytic leukemia, currently in remission      He presents for 6 month follow-up office visit  Reviewed current medications, last blood test results from November 30, 2019  Fasting blood sugar 85, creatinine 0 88, potassium 3 5  CBC with dif-  within normal range  HTN - blood pressure remains stable  Patient takes Benazepril 20 mg daily, HCTZ 12 5 mg daily  Denies chest pain, shortness of breath, dizziness  Patient c/o cold symptoms for the last 2-3 days  His daughter was sick with viral infection last week  C/o low-grade fever, mild non- productive cough, some nasal congestion, mild  Headache  Denies abdominal pain, diarrhea  Started taking Tylenol, uses throat lozenges  Mild intermittent asthma  -patient does not use any  inhalers currently  Symptoms are stable  No recent asthma exacerbations  Acute promyelocytic leukemia -currently in remission  Patient has been followed by hematology- oncology Dr Wiliam Keating every 6 months, last seen in 12/19  Denies tobacco use  Pneumovax done in October 2015  The following portions of the patient's history were reviewed and updated as appropriate: allergies, current medications, past medical history, past social history, past surgical history and problem list     Review of Systems   Constitutional: Positive for fatigue and fever (low grade fever)  Negative for activity change, appetite change and chills  HENT: Positive for congestion and sore throat   Negative for ear pain, mouth sores, nosebleeds, postnasal drip, sinus pressure and trouble swallowing  Eyes: Negative for pain, discharge, redness, itching and visual disturbance  Respiratory: Positive for cough (mild dry cough)  Negative for chest tightness, shortness of breath and wheezing  Cardiovascular: Negative for chest pain, palpitations and leg swelling  Gastrointestinal: Negative for abdominal pain, blood in stool, constipation, diarrhea, nausea and vomiting  Genitourinary: Negative for difficulty urinating, dysuria, flank pain, frequency and hematuria  Musculoskeletal: Negative for arthralgias, back pain, joint swelling, myalgias and neck pain  Skin: Negative for rash and wound  Neurological: Positive for headaches (mild)  Negative for dizziness  Hematological: Negative  Psychiatric/Behavioral: Negative  Objective:      /80 (BP Location: Left arm, Patient Position: Sitting, Cuff Size: Adult)   Pulse (!) 114   Temp 99 3 °F (37 4 °C) (Tympanic)   Resp 14   Ht 5' 10" (1 778 m)   Wt 93 7 kg (206 lb 9 6 oz)   SpO2 96%   BMI 29 64 kg/m²          Physical Exam   Constitutional: He appears well-developed and well-nourished  Mildly obese   HENT:   Head: Normocephalic and atraumatic  Right Ear: External ear normal    Left Ear: External ear normal    Pharynx is erythematous  No exudate  Eyes: Pupils are equal, round, and reactive to light  Conjunctivae are normal    Cardiovascular: Normal rate, regular rhythm and normal heart sounds  No murmur heard  No BL LE edema   Pulmonary/Chest: Effort normal and breath sounds normal  He has no wheezes  He has no rales  Abdominal: Soft  Bowel sounds are normal  There is no tenderness  Musculoskeletal: Normal range of motion  He exhibits no edema, tenderness or deformity  Skin: Skin is warm and dry  No rash noted  Psychiatric: He has a normal mood and affect  Nursing note and vitals reviewed

## 2020-02-04 ENCOUNTER — HOSPITAL ENCOUNTER (OUTPATIENT)
Dept: RADIOLOGY | Facility: HOSPITAL | Age: 45
Discharge: HOME | End: 2020-02-04
Payer: COMMERCIAL

## 2020-02-04 ENCOUNTER — OFFICE VISIT (OUTPATIENT)
Dept: FAMILY MEDICINE CLINIC | Facility: CLINIC | Age: 45
End: 2020-02-04
Payer: COMMERCIAL

## 2020-02-04 ENCOUNTER — TRANSCRIBE ORDERS (OUTPATIENT)
Dept: RADIOLOGY | Facility: HOSPITAL | Age: 45
End: 2020-02-04

## 2020-02-04 ENCOUNTER — TELEPHONE (OUTPATIENT)
Dept: FAMILY MEDICINE CLINIC | Facility: CLINIC | Age: 45
End: 2020-02-04

## 2020-02-04 VITALS
HEIGHT: 70 IN | WEIGHT: 206.2 LBS | BODY MASS INDEX: 29.52 KG/M2 | DIASTOLIC BLOOD PRESSURE: 84 MMHG | RESPIRATION RATE: 16 BRPM | OXYGEN SATURATION: 97 % | HEART RATE: 64 BPM | TEMPERATURE: 99.6 F | SYSTOLIC BLOOD PRESSURE: 130 MMHG

## 2020-02-04 DIAGNOSIS — J20.8 ACUTE BRONCHITIS DUE TO OTHER SPECIFIED ORGANISMS: ICD-10-CM

## 2020-02-04 DIAGNOSIS — J06.9 ACUTE UPPER RESPIRATORY INFECTION: Primary | ICD-10-CM

## 2020-02-04 DIAGNOSIS — J45.21 MILD INTERMITTENT ASTHMA WITH ACUTE EXACERBATION: ICD-10-CM

## 2020-02-04 PROCEDURE — 3008F BODY MASS INDEX DOCD: CPT | Performed by: FAMILY MEDICINE

## 2020-02-04 PROCEDURE — 71046 X-RAY EXAM CHEST 2 VIEWS: CPT

## 2020-02-04 PROCEDURE — 1036F TOBACCO NON-USER: CPT | Performed by: FAMILY MEDICINE

## 2020-02-04 PROCEDURE — 99213 OFFICE O/P EST LOW 20 MIN: CPT | Performed by: FAMILY MEDICINE

## 2020-02-04 PROCEDURE — 3079F DIAST BP 80-89 MM HG: CPT | Performed by: FAMILY MEDICINE

## 2020-02-04 PROCEDURE — 3075F SYST BP GE 130 - 139MM HG: CPT | Performed by: FAMILY MEDICINE

## 2020-02-04 RX ORDER — PREDNISONE 10 MG/1
TABLET ORAL
Qty: 21 TABLET | Refills: 0 | Status: SHIPPED | OUTPATIENT
Start: 2020-02-04 | End: 2020-06-11 | Stop reason: ALTCHOICE

## 2020-02-04 RX ORDER — HYDROCODONE POLISTIREX AND CHLORPHENIRAMINE POLISTIREX 10; 8 MG/5ML; MG/5ML
5 SUSPENSION, EXTENDED RELEASE ORAL EVERY 12 HOURS PRN
Qty: 120 ML | Refills: 0 | Status: SHIPPED | OUTPATIENT
Start: 2020-02-04 | End: 2020-06-11 | Stop reason: ALTCHOICE

## 2020-02-04 RX ORDER — AZITHROMYCIN 250 MG/1
TABLET, FILM COATED ORAL
Qty: 6 TABLET | Refills: 0 | Status: SHIPPED | OUTPATIENT
Start: 2020-02-04 | End: 2020-02-08

## 2020-02-04 NOTE — ASSESSMENT & PLAN NOTE
Start Zithromax for 5 days, Tussionex 1 tsp  twice daily for cough  Will check chest x-ray to rule out pneumonia  Call office if symptoms persist or worsen

## 2020-02-04 NOTE — PROGRESS NOTES
Chief Complaint   Patient presents with    URI     Re-Evaluate acute upper respiratory infection     Health Maintenance   Topic Date Due    DTaP,Tdap,and Td Vaccines (1 - Tdap) 07/23/1985    HIV Screening  07/23/1989    Annual Physical  07/23/1992    Pneumococcal Vaccine: Pediatrics (0 to 5 Years) and At-Risk Patients (6 to 59 Years) (2 of 3 - PCV13) 10/15/2016    Influenza Vaccine  01/28/2021 (Originally 7/1/2019)    BMI: Followup Plan  07/22/2020    Depression Screening PHQ  01/28/2021    BMI: Adult  01/28/2021    Pneumococcal Vaccine: 65+ Years (1 of 2 - PCV13) 07/23/2039    HIB Vaccine  Aged Out    Hepatitis B Vaccine  Aged Out    IPV Vaccine  Aged Out    Hepatitis A Vaccine  Aged Out    Meningococcal ACWY Vaccine  Aged Out    HPV Vaccine  Aged Out       Assessment/Plan:    Acute upper respiratory infection  Recommended to stay well hydrated, take Tylenol PRN for fever  Acute bronchitis due to other specified organisms  Start Zithromax for 5 days, Tussionex 1 tsp  twice daily for cough  Will check chest x-ray to rule out pneumonia  Call office if symptoms persist or worsen  Mild intermittent asthma with acute exacerbation  Will start on Prednisone taper  Diagnoses and all orders for this visit:    Acute upper respiratory infection    Acute bronchitis due to other specified organisms  -     azithromycin (ZITHROMAX) 250 mg tablet; Take 2 tablets 1 st day, then 1 tablet daily for 4 days, then stop  -     XR chest pa & lateral; Future  -     hydrocodone-chlorpheniramine polistirex (TUSSIONEX) 10-8 mg/5 mL ER suspension; Take 5 mL by mouth every 12 (twelve) hours as needed for coughMax Daily Amount: 10 mL    Mild intermittent asthma with acute exacerbation  -     predniSONE 10 mg tablet; Take 4 tab  for 3 days, then 2 tab  for 3 days, then 1 tab  for 3 days, then stop          Subjective:      Patient ID: Nani Coughlin is a 38 y o  male      HPI     Patient is 58-year-old male with asthma, acute promyelocytic leukemia in remission presents for re-evaluation upper respiratory infection  He was seen in the office on January 28, 2020  Patient reports worsening symptoms  C/o nasal congestion, sore throat, chest congestion, persistent non- productive cough, some chest tightness, low-grade fever  He has been taking Tylenol cold and Mucinex with no relief in symptoms  He uses Ventolin HFA inhaler PRN for chest tightness  Denies tobacco use  The following portions of the patient's history were reviewed and updated as appropriate: allergies, current medications, past family history, past social history, past surgical history and problem list     Review of Systems   Constitutional: Positive for fatigue and fever (low grade fever)  Negative for activity change, appetite change and chills  HENT: Positive for congestion and sore throat  Negative for ear pain, mouth sores, nosebleeds and trouble swallowing  Eyes: Negative for pain, discharge, redness, itching and visual disturbance  Respiratory: Positive for cough and chest tightness  Negative for shortness of breath and wheezing  Cardiovascular: Negative for chest pain, palpitations and leg swelling  Gastrointestinal: Negative for abdominal pain, diarrhea, nausea and vomiting  Genitourinary: Negative for difficulty urinating, dysuria, flank pain, frequency and hematuria  Musculoskeletal: Negative for arthralgias, back pain, joint swelling, myalgias and neck pain  Skin: Negative for rash  Neurological: Negative for dizziness and headaches  Hematological: Negative  Objective:      /84 (BP Location: Left arm, Patient Position: Sitting, Cuff Size: Adult)   Pulse 64   Temp 99 6 °F (37 6 °C) (Tympanic)   Resp 16   Ht 5' 10" (1 778 m)   Wt 93 5 kg (206 lb 3 2 oz)   SpO2 97%   BMI 29 59 kg/m²          Physical Exam   Constitutional: He appears well-developed and well-nourished     HENT:   Head: Normocephalic and atraumatic  Right Ear: External ear normal    Left Ear: External ear normal    Mouth/Throat: Oropharynx is clear and moist    Nasal mucosa is edematous   Eyes: Pupils are equal, round, and reactive to light  Conjunctivae are normal    Cardiovascular: Normal rate, regular rhythm and normal heart sounds  No murmur heard  No BL LE edema   Pulmonary/Chest: Effort normal  He has no wheezes  Slightly decreased breath sounds at lung bases   Abdominal: Soft  Bowel sounds are normal  There is no tenderness  Musculoskeletal: Normal range of motion  He exhibits no edema, tenderness or deformity  Skin: Skin is warm and dry  No rash noted  Nursing note and vitals reviewed

## 2020-02-04 NOTE — TELEPHONE ENCOUNTER
Pharmacist stated that they are out of Xanax and will not have it until next week  Do you want to order something different or wait  Please call patient back to advise   Thank You

## 2020-02-05 NOTE — TELEPHONE ENCOUNTER
I spoke with the pharmacist over the phone  Tussionex is not available at the pharmacy  Prescription was changed to Guaifenesin with Codeine to take 1 tsp 4 times daily PRN for cough

## 2020-03-01 ENCOUNTER — OFFICE VISIT (OUTPATIENT)
Dept: URGENT CARE | Age: 45
End: 2020-03-01
Payer: COMMERCIAL

## 2020-03-01 VITALS
TEMPERATURE: 98.3 F | SYSTOLIC BLOOD PRESSURE: 134 MMHG | DIASTOLIC BLOOD PRESSURE: 75 MMHG | RESPIRATION RATE: 18 BRPM | OXYGEN SATURATION: 95 % | HEART RATE: 79 BPM

## 2020-03-01 DIAGNOSIS — J01.00 ACUTE MAXILLARY SINUSITIS, RECURRENCE NOT SPECIFIED: Primary | ICD-10-CM

## 2020-03-01 DIAGNOSIS — J20.8 ACUTE BRONCHITIS DUE TO OTHER SPECIFIED ORGANISMS: ICD-10-CM

## 2020-03-01 PROCEDURE — G0382 LEV 3 HOSP TYPE B ED VISIT: HCPCS | Performed by: PHYSICIAN ASSISTANT

## 2020-03-01 PROCEDURE — 99283 EMERGENCY DEPT VISIT LOW MDM: CPT | Performed by: PHYSICIAN ASSISTANT

## 2020-03-01 RX ORDER — DOXYCYCLINE 100 MG/1
100 TABLET ORAL 2 TIMES DAILY
Qty: 10 TABLET | Refills: 0 | Status: SHIPPED | OUTPATIENT
Start: 2020-03-01 | End: 2020-03-06

## 2020-03-01 NOTE — PATIENT INSTRUCTIONS
Take antibiotics as prescribed  Use Flonase or nasal saline spray for symptomatic treatment  Warm water gargles  Change toothbrush in 2-3 days  Stay hydrated with lots of water/fluids  Follow-up with PCP in the next 1-2 days for reexamination and to ensure resolution of symptoms  Go to the ED if any fevers, unable to stay hydrated, abdominal pain, chest pain, chest tightness, wheezing shortness of breath, leg swelling, headaches, fatigue, change in voice, pain or difficulty swallowing, new or worsening symptoms or other concerning symptoms

## 2020-03-01 NOTE — PROGRESS NOTES
[facility] Now        NAME: Radha Castro is a 38 y o  male  : 1975    MRN: [id number]  DATE: 2020  TIME: 6:33 PM    Assessment and Plan   Acute maxillary sinusitis, recurrence not specified [J01 00]  1  Acute maxillary sinusitis, recurrence not specified  doxycycline (ADOXA) 100 MG tablet   2  Acute bronchitis due to other specified organisms           Patient Instructions     Take antibiotics as prescribed  Use Flonase or nasal saline spray for symptomatic treatment  Warm water gargles  Change toothbrush in 2-3 days  Stay hydrated with lots of water/fluids  Follow-up with PCP in the next 1-2 days for reexamination and to ensure resolution of symptoms  Go to the ED if any fevers, unable to stay hydrated, abdominal pain, chest pain, chest tightness, wheezing shortness of breath, leg swelling, headaches, fatigue, change in voice, pain or difficulty swallowing, new or worsening symptoms or other concerning symptoms  Chief Complaint     Chief Complaint   Patient presents with    Cold Like Symptoms     cough, chest congestion  was seen by PCP greater than 2 weeks ago         History of Present Illness       54-year-old male presents with cough, sinus pressure, postnasal drip, nasal congestion, runny nose times 3 weeks  Patient states the cough is sometimes dry, sometimes productive of clearish phlegm  Patient states he was seen by his PCP about 2 weeks ago, was placed on azithromycin, prednisone and given cough medications  States it slightly helped his cough but is still having the sinus pressure and postnasal drip  States he also had a chest x-ray done which was normal  States he was having some intermittent congestion, tried his inhaler/albuterol yesterday with significant relief  Denies any wheezing, chest tightness, shortness of breath  States he is eating and drinking normally, staying hydrated lot of fluids  Denies any known sick contacts    Denies any recent travel exposure to the corona virus  Denies any fevers, chest pain, headaches, fatigue, GI/ symptoms or other complaints  Review of Systems   Review of Systems   Constitutional: Negative for activity change, appetite change, chills, fatigue and fever  HENT: Positive for congestion, postnasal drip, rhinorrhea and sinus pressure  Negative for ear discharge, ear pain, facial swelling, sore throat, trouble swallowing and voice change  Eyes: Negative for itching and visual disturbance  Respiratory: Positive for cough  Negative for chest tightness, shortness of breath and wheezing  Cardiovascular: Negative for chest pain and leg swelling  Gastrointestinal: Negative for abdominal pain, diarrhea, nausea and vomiting  Musculoskeletal: Negative for back pain, joint swelling, neck pain and neck stiffness  Skin: Negative for rash  Neurological: Negative for dizziness, seizures, weakness, numbness and headaches  All other systems reviewed and are negative          Current Medications       Current Outpatient Medications:     albuterol (VENTOLIN HFA) 90 mcg/act inhaler, Use 2 puffs every 4-6 hr  PRN for chest tightness or wheezing, Disp: 1 Inhaler, Rfl: 3    benazepril (LOTENSIN) 20 mg tablet, TAKE 1 TABLET BY MOUTH EVERY DAY, Disp: 90 tablet, Rfl: 2    hydrochlorothiazide (HYDRODIURIL) 12 5 mg tablet, TAKE 1 TABLET BY MOUTH EVERY DAY, Disp: 90 tablet, Rfl: 2    hydrocodone-chlorpheniramine polistirex (TUSSIONEX) 10-8 mg/5 mL ER suspension, Take 5 mL by mouth every 12 (twelve) hours as needed for coughMax Daily Amount: 10 mL, Disp: 120 mL, Rfl: 0    doxycycline (ADOXA) 100 MG tablet, Take 1 tablet (100 mg total) by mouth 2 (two) times a day for 5 days, Disp: 10 tablet, Rfl: 0    predniSONE 10 mg tablet, Take 4 tab  for 3 days, then 2 tab  for 3 days, then 1 tab  for 3 days, then stop (Patient not taking: Reported on 3/1/2020), Disp: 21 tablet, Rfl: 0    Current Allergies     Allergies as of 03/01/2020 - Reviewed 03/01/2020   Allergen Reaction Noted    Penicillins Hives 02/07/2016            The following portions of the patient's history were reviewed and updated as appropriate: allergies, current medications, past family history, past medical history, past social history, past surgical history and problem list      Past Medical History:   Diagnosis Date    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NAKUL M3) ([facility] )     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[facility] )     Allergic rhinitis     Asthma     Essential hypertension, benign     Leukemia ([facility] )     Obesity     Personal history of other diseases of respiratory system        Past Surgical History:   Procedure Laterality Date    BONE MARROW BIOPSY W/ ASPIRATION N/A     PORTACATH PLACEMENT Right     WISDOM TOOTH EXTRACTION         Family History   Problem Relation Age of Onset    Hypertension Father          Medications have been verified  Objective   /75   Pulse 79   Temp 98 3 °F (36 8 °C) (Temporal)   Resp 18   SpO2 95%        Physical Exam     Physical Exam   Constitutional: He is oriented to person, place, and time  He appears well-developed and well-nourished  No distress  HENT:   Right Ear: Tympanic membrane normal    Left Ear: Tympanic membrane normal    Mouth/Throat: Uvula is midline and mucous membranes are normal  No uvula swelling  No posterior oropharyngeal edema or posterior oropharyngeal erythema  Mildly edematous bilateral nasal turbinates  Mild PND present    Mild bilateral maxillary sinus pressure/discomfort to palpation  Airway patent, handling secretions  Eyes: Pupils are equal, round, and reactive to light  Neck: Normal range of motion  Neck supple  Cardiovascular: Normal rate, regular rhythm and normal heart sounds  Pulmonary/Chest: Effort normal and breath sounds normal  No respiratory distress  He has no wheezes  Neurological: He is alert and oriented to person, place, and time     Psychiatric: He has a normal mood and affect  His behavior is normal    Nursing note and vitals reviewed

## 2020-06-01 ENCOUNTER — TELEPHONE (OUTPATIENT)
Dept: HEMATOLOGY ONCOLOGY | Facility: CLINIC | Age: 45
End: 2020-06-01

## 2020-06-03 ENCOUNTER — APPOINTMENT (OUTPATIENT)
Dept: LAB | Facility: HOSPITAL | Age: 45
End: 2020-06-03
Attending: INTERNAL MEDICINE
Payer: COMMERCIAL

## 2020-06-03 DIAGNOSIS — C92.41 ACUTE PROMYELOCYTIC LEUKEMIA IN REMISSION (HCC): ICD-10-CM

## 2020-06-03 PROCEDURE — 81315 PML/RARALPHA COM BREAKPOINTS: CPT

## 2020-06-08 LAB — MISCELLANEOUS LAB TEST RESULT: NORMAL

## 2020-06-09 ENCOUNTER — TELEPHONE (OUTPATIENT)
Dept: HEMATOLOGY ONCOLOGY | Facility: CLINIC | Age: 45
End: 2020-06-09

## 2020-06-11 ENCOUNTER — APPOINTMENT (OUTPATIENT)
Dept: LAB | Facility: HOSPITAL | Age: 45
End: 2020-06-11
Attending: INTERNAL MEDICINE
Payer: COMMERCIAL

## 2020-06-11 ENCOUNTER — OFFICE VISIT (OUTPATIENT)
Dept: HEMATOLOGY ONCOLOGY | Facility: CLINIC | Age: 45
End: 2020-06-11
Payer: COMMERCIAL

## 2020-06-11 VITALS
HEART RATE: 78 BPM | BODY MASS INDEX: 30.55 KG/M2 | TEMPERATURE: 98.3 F | OXYGEN SATURATION: 98 % | SYSTOLIC BLOOD PRESSURE: 126 MMHG | RESPIRATION RATE: 18 BRPM | DIASTOLIC BLOOD PRESSURE: 76 MMHG | WEIGHT: 213.4 LBS | HEIGHT: 70 IN

## 2020-06-11 DIAGNOSIS — C92.41 ACUTE PROMYELOCYTIC LEUKEMIA IN REMISSION (HCC): ICD-10-CM

## 2020-06-11 DIAGNOSIS — C92.41 ACUTE PROMYELOCYTIC LEUKEMIA IN REMISSION (HCC): Primary | ICD-10-CM

## 2020-06-11 LAB
ALBUMIN SERPL BCP-MCNC: 3.4 G/DL (ref 3.5–5)
ALP SERPL-CCNC: 67 U/L (ref 46–116)
ALT SERPL W P-5'-P-CCNC: 40 U/L (ref 12–78)
ANION GAP SERPL CALCULATED.3IONS-SCNC: 1 MMOL/L (ref 4–13)
AST SERPL W P-5'-P-CCNC: 23 U/L (ref 5–45)
BASOPHILS # BLD AUTO: 0.04 THOUSANDS/ΜL (ref 0–0.1)
BASOPHILS NFR BLD AUTO: 1 % (ref 0–1)
BILIRUB SERPL-MCNC: 0.78 MG/DL (ref 0.2–1)
BUN SERPL-MCNC: 18 MG/DL (ref 5–25)
CALCIUM SERPL-MCNC: 8.4 MG/DL (ref 8.3–10.1)
CHLORIDE SERPL-SCNC: 109 MMOL/L (ref 100–108)
CO2 SERPL-SCNC: 30 MMOL/L (ref 21–32)
CREAT SERPL-MCNC: 0.88 MG/DL (ref 0.6–1.3)
EOSINOPHIL # BLD AUTO: 0.34 THOUSAND/ΜL (ref 0–0.61)
EOSINOPHIL NFR BLD AUTO: 6 % (ref 0–6)
ERYTHROCYTE [DISTWIDTH] IN BLOOD BY AUTOMATED COUNT: 11.9 % (ref 11.6–15.1)
GFR SERPL CREATININE-BSD FRML MDRD: 104 ML/MIN/1.73SQ M
GLUCOSE P FAST SERPL-MCNC: 94 MG/DL (ref 65–99)
HCT VFR BLD AUTO: 43.5 % (ref 36.5–49.3)
HGB BLD-MCNC: 15.1 G/DL (ref 12–17)
IMM GRANULOCYTES # BLD AUTO: 0.01 THOUSAND/UL (ref 0–0.2)
IMM GRANULOCYTES NFR BLD AUTO: 0 % (ref 0–2)
LYMPHOCYTES # BLD AUTO: 1.5 THOUSANDS/ΜL (ref 0.6–4.47)
LYMPHOCYTES NFR BLD AUTO: 26 % (ref 14–44)
MCH RBC QN AUTO: 31.9 PG (ref 26.8–34.3)
MCHC RBC AUTO-ENTMCNC: 34.7 G/DL (ref 31.4–37.4)
MCV RBC AUTO: 92 FL (ref 82–98)
MONOCYTES # BLD AUTO: 0.51 THOUSAND/ΜL (ref 0.17–1.22)
MONOCYTES NFR BLD AUTO: 9 % (ref 4–12)
NEUTROPHILS # BLD AUTO: 3.32 THOUSANDS/ΜL (ref 1.85–7.62)
NEUTS SEG NFR BLD AUTO: 58 % (ref 43–75)
NRBC BLD AUTO-RTO: 0 /100 WBCS
PLATELET # BLD AUTO: 209 THOUSANDS/UL (ref 149–390)
PMV BLD AUTO: 10.7 FL (ref 8.9–12.7)
POTASSIUM SERPL-SCNC: 3.9 MMOL/L (ref 3.5–5.3)
PROT SERPL-MCNC: 6.6 G/DL (ref 6.4–8.2)
RBC # BLD AUTO: 4.73 MILLION/UL (ref 3.88–5.62)
SODIUM SERPL-SCNC: 140 MMOL/L (ref 136–145)
WBC # BLD AUTO: 5.72 THOUSAND/UL (ref 4.31–10.16)

## 2020-06-11 PROCEDURE — 85025 COMPLETE CBC W/AUTO DIFF WBC: CPT

## 2020-06-11 PROCEDURE — 1036F TOBACCO NON-USER: CPT | Performed by: INTERNAL MEDICINE

## 2020-06-11 PROCEDURE — 36415 COLL VENOUS BLD VENIPUNCTURE: CPT

## 2020-06-11 PROCEDURE — 99213 OFFICE O/P EST LOW 20 MIN: CPT | Performed by: INTERNAL MEDICINE

## 2020-06-11 PROCEDURE — 3008F BODY MASS INDEX DOCD: CPT | Performed by: INTERNAL MEDICINE

## 2020-06-11 PROCEDURE — 3074F SYST BP LT 130 MM HG: CPT | Performed by: INTERNAL MEDICINE

## 2020-06-11 PROCEDURE — 3078F DIAST BP <80 MM HG: CPT | Performed by: INTERNAL MEDICINE

## 2020-06-11 PROCEDURE — 80053 COMPREHEN METABOLIC PANEL: CPT

## 2020-07-04 ENCOUNTER — HOSPITAL ENCOUNTER (EMERGENCY)
Facility: HOSPITAL | Age: 45
Discharge: HOME | End: 2020-07-04
Attending: EMERGENCY MEDICINE | Admitting: EMERGENCY MEDICINE
Payer: COMMERCIAL

## 2020-07-04 VITALS
BODY MASS INDEX: 30.13 KG/M2 | HEART RATE: 75 BPM | WEIGHT: 210 LBS | DIASTOLIC BLOOD PRESSURE: 101 MMHG | OXYGEN SATURATION: 98 % | TEMPERATURE: 98.2 F | SYSTOLIC BLOOD PRESSURE: 163 MMHG | RESPIRATION RATE: 18 BRPM

## 2020-07-04 DIAGNOSIS — S61.419A HAND LACERATION: Primary | ICD-10-CM

## 2020-07-04 PROCEDURE — 90471 IMMUNIZATION ADMIN: CPT

## 2020-07-04 PROCEDURE — 99282 EMERGENCY DEPT VISIT SF MDM: CPT

## 2020-07-04 PROCEDURE — 12001 RPR S/N/AX/GEN/TRNK 2.5CM/<: CPT | Performed by: EMERGENCY MEDICINE

## 2020-07-04 PROCEDURE — 90715 TDAP VACCINE 7 YRS/> IM: CPT | Performed by: EMERGENCY MEDICINE

## 2020-07-04 PROCEDURE — 99284 EMERGENCY DEPT VISIT MOD MDM: CPT | Performed by: EMERGENCY MEDICINE

## 2020-07-04 RX ORDER — LIDOCAINE HYDROCHLORIDE 10 MG/ML
5 INJECTION, SOLUTION EPIDURAL; INFILTRATION; INTRACAUDAL; PERINEURAL ONCE
Status: COMPLETED | OUTPATIENT
Start: 2020-07-04 | End: 2020-07-04

## 2020-07-04 RX ADMIN — TETANUS TOXOID, REDUCED DIPHTHERIA TOXOID AND ACELLULAR PERTUSSIS VACCINE, ADSORBED 0.5 ML: 5; 2.5; 8; 8; 2.5 SUSPENSION INTRAMUSCULAR at 09:39

## 2020-07-04 RX ADMIN — LIDOCAINE HYDROCHLORIDE 5 ML: 10 INJECTION, SOLUTION EPIDURAL; INFILTRATION; INTRACAUDAL; PERINEURAL at 09:39

## 2020-07-04 NOTE — ED ATTENDING ATTESTATION
7/4/2020  I, Amaris Schwartz MD, saw and evaluated the patient  I have discussed the patient with the resident/non-physician practitioner and agree with the resident's/non-physician practitioner's findings, Plan of Care, and MDM as documented in the resident's/non-physician practitioner's note, except where noted  All available labs and Radiology studies were reviewed  I was present for key portions of any procedure(s) performed by the resident/non-physician practitioner and I was immediately available to provide assistance  At this point I agree with the current assessment done in the Emergency Department  I have conducted an independent evaluation of this patient a history and physical is as follows: This is a 58-year-old right-hand-dominant male who was cutting a ring bologna earlier today and cut the webspace between his thumb and index finger of his left hand  Patient is not on blood thinners is not on immunosuppression  He denies numbness, tingling, or weakness  Bleeding was controlled at home  Patient denies other injuries  He has unknown last tetanus  On exam the patient has a 1 cm laceration involving the webspace between his thenar eminence and his index finger  There is no exposed tendon  He is neurovascularly intact with normal flexion and apposition  Impression:  Hand laceration    Will plan to repair laceration, update tetanus  ED Course         Critical Care Time  Procedures

## 2020-07-04 NOTE — ED PROVIDER NOTES
History  Chief Complaint   Patient presents with    Hand Laceration     Pt cut his L hand cutting meat     HPI  Patient is a 23-year-old male history of PML not on active chemotherapy presenting for evaluation of left-sided hand laceration  Patient states that he was cutting Bologna at home, the knife slipped, and he cut the base of his left thumb  Patient denies any loss of strength or range of motion in his left thumb  Patient denies loss of sensation  Patient states that he irrigated the wound copiously at home immediately prior to coming to the emergency department, states that he sustained this wound about half an hour ago  Prior to Admission Medications   Prescriptions Last Dose Informant Patient Reported? Taking? albuterol (VENTOLIN HFA) 90 mcg/act inhaler  Self No Yes   Sig: Use 2 puffs every 4-6 hr  PRN for chest tightness or wheezing   benazepril (LOTENSIN) 20 mg tablet  Self No Yes   Sig: TAKE 1 TABLET BY MOUTH EVERY DAY   hydrochlorothiazide (HYDRODIURIL) 12 5 mg tablet  Self No Yes   Sig: TAKE 1 TABLET BY MOUTH EVERY DAY      Facility-Administered Medications: None       Past Medical History:   Diagnosis Date    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NAKUL M3) [facility]     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[facility]     Allergic rhinitis     Asthma     Essential hypertension, benign     Leukemia ([facility]     Obesity     Personal history of other diseases of respiratory system        Past Surgical History:   Procedure Laterality Date    BONE MARROW BIOPSY W/ ASPIRATION N/A     PORTACATH PLACEMENT Right     WISDOM TOOTH EXTRACTION         Family History   Problem Relation Age of Onset    Hypertension Father      I have reviewed and agree with the history as documented      E-Cigarette/Vaping    E-Cigarette Use Never User      E-Cigarette/Vaping Substances     Social History     Tobacco Use    Smoking status: Never Smoker    Smokeless tobacco: Never Used   Substance Use Topics    Alcohol use: No    Drug use: No        Review of Systems   Constitutional: Negative for chills, fatigue and fever  Skin: Positive for wound  Negative for color change, pallor and rash  Physical Exam  ED Triage Vitals [07/04/20 0931]   Temperature Pulse Respirations Blood Pressure SpO2   98 2 °F (36 8 °C) 75 18 (!) 163/101 98 %      Temp Source Heart Rate Source Patient Position - Orthostatic VS BP Location FiO2 (%)   Tympanic Monitor Sitting Right arm --      Pain Score       --             Orthostatic Vital Signs  Vitals:    07/04/20 0931   BP: (!) 163/101   Pulse: 75   Patient Position - Orthostatic VS: Sitting       Physical Exam   Constitutional: He is oriented to person, place, and time  He appears well-developed and well-nourished  No distress  HENT:   Head: Normocephalic and atraumatic  Right Ear: External ear normal    Left Ear: External ear normal    Nose: Nose normal    Mouth/Throat: Oropharynx is clear and moist  No oropharyngeal exudate  Eyes: Pupils are equal, round, and reactive to light  Conjunctivae are normal    Cardiovascular: Normal rate, regular rhythm, normal heart sounds and intact distal pulses  Exam reveals no gallop and no friction rub  No murmur heard  Pulmonary/Chest: Effort normal and breath sounds normal  No respiratory distress  He has no wheezes  He exhibits no tenderness  Abdominal: Soft  Bowel sounds are normal  He exhibits no distension and no mass  There is no tenderness  There is no rebound and no guarding  Musculoskeletal: He exhibits no edema or deformity  Neurological: He is alert and oriented to person, place, and time  Skin: Skin is warm and dry  Capillary refill takes less than 2 seconds  He is not diaphoretic  Roughly 1 cm linear laceration of the thenar eminence on the volar aspect, not actively bleeding, no gross contamination, no tendinous involvement    No deficits on thumb or index finger opposition, flexion, extension, abduction, and adduction   Psychiatric: He has a normal mood and affect  His behavior is normal    Nursing note and vitals reviewed  ED Medications  Medications   lidocaine (PF) (XYLOCAINE-MPF) 1 % injection 5 mL (5 mL Infiltration Given by Other 7/4/20 0939)   tetanus-diphtheria-acellular pertussis (BOOSTRIX) IM injection 0 5 mL (0 5 mL Intramuscular Given 7/4/20 0939)       Diagnostic Studies  Results Reviewed     None                 No orders to display         Procedures  Laceration repair  Date/Time: 7/4/2020 9:47 AM  Performed by: Slim Francisco MD  Authorized by: Slim Francisco MD   Consent: Verbal consent obtained  Consent given by: patient  Patient identity confirmed: verbally with patient  Body area: upper extremity  Location details: left hand  Laceration length: 1 cm  Tendon involvement: none  Nerve involvement: none  Vascular damage: no  Anesthesia: local infiltration    Anesthesia:  Local Anesthetic: lidocaine 1% without epinephrine  Anesthetic total: 3 mL    Sedation:  Patient sedated: no      Wound Dehiscence:  Superficial Wound Dehiscence: simple closure      Procedure Details:  Irrigation solution: tap water  Irrigation method: tap  Subcutaneous closure: 5-0 Vicryl  Number of sutures: 1  Technique: simple  Approximation difficulty: simple  Dressing: 4x4 sterile gauze            ED Course                                           MDM  Number of Diagnoses or Management Options  Hand laceration:   Diagnosis management comments: 38 M with hand laceration immediately PTA, will update tetanus, will require closure   Wound irrigated and closed, see separate wound, patient discharged with instructions for removal in 1 week       Amount and/or Complexity of Data Reviewed  Decide to obtain previous medical records or to obtain history from someone other than the patient: yes  Review and summarize past medical records: yes    Risk of Complications, Morbidity, and/or Mortality  Presenting problems: low  Diagnostic procedures: low  Management options: low    Patient Progress  Patient progress: improved        Disposition  Final diagnoses:   Hand laceration     Time reflects when diagnosis was documented in both MDM as applicable and the Disposition within this note     Time User Action Codes Description Comment    7/4/2020  9:53 AM Sun Games Add [J96 [id number]] Hand laceration       ED Disposition     ED Disposition Condition Date/Time Comment    Discharge Stable Sat Jul 4, 2020  9:52 AM Rodriguez Wei discharge to home/self care  Follow-up Information     Follow up With Specialties Details Why Contact Info    Ivy Zuluaga, [address] [facility]  [phone]            Discharge Medication List as of 7/4/2020  9:55 AM      CONTINUE these medications which have NOT CHANGED    Details   albuterol (VENTOLIN HFA) 90 mcg/act inhaler Use 2 puffs every 4-6 hr  PRN for chest tightness or wheezing, Normal      benazepril (LOTENSIN) 20 mg tablet TAKE 1 TABLET BY MOUTH EVERY DAY, Normal      hydrochlorothiazide (HYDRODIURIL) 12 5 mg tablet TAKE 1 TABLET BY MOUTH EVERY DAY, Normal           No discharge procedures on file  PDMP Review     None           ED Provider  Attending physically available and evaluated Rodriguez Wei I managed the patient along with the ED Attending      Electronically Signed by         Justice Fleming MD  07/04/20 4726

## 2020-07-04 NOTE — DISCHARGE INSTRUCTIONS
Please follow up with PCP, urgent care, or return to ED in 7 days for suture removal  If you develop redness, warmth, worsening pain, pus, or fevers/chills, return to the ED as this may reflect an infection of the affected site

## 2020-11-02 DIAGNOSIS — I10 BENIGN ESSENTIAL HYPERTENSION: ICD-10-CM

## 2020-11-02 RX ORDER — HYDROCHLOROTHIAZIDE 12.5 MG/1
12.5 TABLET ORAL DAILY
Qty: 90 TABLET | Refills: 2 | Status: SHIPPED | OUTPATIENT
Start: 2020-11-02 | End: 2021-08-23

## 2020-11-02 RX ORDER — BENAZEPRIL HYDROCHLORIDE 20 MG/1
20 TABLET ORAL DAILY
Qty: 90 TABLET | Refills: 2 | Status: SHIPPED | OUTPATIENT
Start: 2020-11-02 | End: 2021-08-23

## 2020-12-10 ENCOUNTER — TELEMEDICINE (OUTPATIENT)
Dept: FAMILY MEDICINE CLINIC | Facility: CLINIC | Age: 45
End: 2020-12-10
Payer: COMMERCIAL

## 2020-12-10 DIAGNOSIS — Z20.822 SUSPECTED COVID-19 VIRUS INFECTION: Primary | ICD-10-CM

## 2020-12-10 DIAGNOSIS — Z20.822 SUSPECTED COVID-19 VIRUS INFECTION: ICD-10-CM

## 2020-12-10 PROBLEM — J45.21 MILD INTERMITTENT ASTHMA WITH ACUTE EXACERBATION: Status: RESOLVED | Noted: 2020-02-04 | Resolved: 2020-12-10

## 2020-12-10 PROBLEM — J06.9 ACUTE UPPER RESPIRATORY INFECTION: Status: RESOLVED | Noted: 2020-01-28 | Resolved: 2020-12-10

## 2020-12-10 PROBLEM — J20.8 ACUTE BRONCHITIS DUE TO OTHER SPECIFIED ORGANISMS: Status: RESOLVED | Noted: 2020-02-04 | Resolved: 2020-12-10

## 2020-12-10 PROCEDURE — 99213 OFFICE O/P EST LOW 20 MIN: CPT | Performed by: NURSE PRACTITIONER

## 2020-12-10 PROCEDURE — U0003 INFECTIOUS AGENT DETECTION BY NUCLEIC ACID (DNA OR RNA); SEVERE ACUTE RESPIRATORY SYNDROME CORONAVIRUS 2 (SARS-COV-2) (CORONAVIRUS DISEASE [COVID-19]), AMPLIFIED PROBE TECHNIQUE, MAKING USE OF HIGH THROUGHPUT TECHNOLOGIES AS DESCRIBED BY CMS-2020-01-R: HCPCS | Performed by: NURSE PRACTITIONER

## 2020-12-11 ENCOUNTER — TELEMEDICINE (OUTPATIENT)
Dept: FAMILY MEDICINE CLINIC | Facility: CLINIC | Age: 45
End: 2020-12-11
Payer: COMMERCIAL

## 2020-12-11 ENCOUNTER — TELEPHONE (OUTPATIENT)
Dept: FAMILY MEDICINE CLINIC | Facility: CLINIC | Age: 45
End: 2020-12-11

## 2020-12-11 VITALS — TEMPERATURE: 98.5 F

## 2020-12-11 DIAGNOSIS — U07.1 COVID-19 VIRUS INFECTION: Primary | ICD-10-CM

## 2020-12-11 LAB — SARS-COV-2 RNA SPEC QL NAA+PROBE: DETECTED

## 2020-12-11 PROCEDURE — 99213 OFFICE O/P EST LOW 20 MIN: CPT | Performed by: FAMILY MEDICINE

## 2020-12-15 ENCOUNTER — TELEMEDICINE (OUTPATIENT)
Dept: FAMILY MEDICINE CLINIC | Facility: CLINIC | Age: 45
End: 2020-12-15
Payer: COMMERCIAL

## 2020-12-15 DIAGNOSIS — U07.1 COVID-19 VIRUS INFECTION: Primary | ICD-10-CM

## 2020-12-15 PROCEDURE — 1036F TOBACCO NON-USER: CPT | Performed by: FAMILY MEDICINE

## 2020-12-15 PROCEDURE — 99213 OFFICE O/P EST LOW 20 MIN: CPT | Performed by: FAMILY MEDICINE

## 2020-12-17 ENCOUNTER — TELEPHONE (OUTPATIENT)
Dept: OTHER | Facility: OTHER | Age: 45
End: 2020-12-17

## 2020-12-17 ENCOUNTER — TELEMEDICINE (OUTPATIENT)
Dept: FAMILY MEDICINE CLINIC | Facility: CLINIC | Age: 45
End: 2020-12-17
Payer: COMMERCIAL

## 2020-12-17 DIAGNOSIS — U07.1 COVID-19: Primary | ICD-10-CM

## 2020-12-17 PROCEDURE — 99442 PR PHYS/QHP TELEPHONE EVALUATION 11-20 MIN: CPT | Performed by: FAMILY MEDICINE

## 2020-12-18 ENCOUNTER — OFFICE VISIT (OUTPATIENT)
Dept: URGENT CARE | Age: 45
End: 2020-12-18
Payer: COMMERCIAL

## 2020-12-18 ENCOUNTER — APPOINTMENT (OUTPATIENT)
Dept: RADIOLOGY | Age: 45
End: 2020-12-18
Attending: FAMILY MEDICINE
Payer: COMMERCIAL

## 2020-12-18 VITALS — OXYGEN SATURATION: 96 % | RESPIRATION RATE: 18 BRPM | HEART RATE: 120 BPM | TEMPERATURE: 97.7 F

## 2020-12-18 DIAGNOSIS — U07.1 COVID-19: ICD-10-CM

## 2020-12-18 DIAGNOSIS — R50.9 FEVER, UNSPECIFIED FEVER CAUSE: ICD-10-CM

## 2020-12-18 DIAGNOSIS — U07.1 COVID-19: Primary | ICD-10-CM

## 2020-12-18 PROCEDURE — G0382 LEV 3 HOSP TYPE B ED VISIT: HCPCS | Performed by: FAMILY MEDICINE

## 2020-12-18 PROCEDURE — 71046 X-RAY EXAM CHEST 2 VIEWS: CPT

## 2020-12-18 RX ORDER — AZITHROMYCIN 250 MG/1
TABLET, FILM COATED ORAL
Qty: 6 TABLET | Refills: 0 | Status: SHIPPED | OUTPATIENT
Start: 2020-12-18 | End: 2020-12-22

## 2020-12-21 ENCOUNTER — TELEMEDICINE (OUTPATIENT)
Dept: FAMILY MEDICINE CLINIC | Facility: CLINIC | Age: 45
End: 2020-12-21
Payer: COMMERCIAL

## 2020-12-21 VITALS — TEMPERATURE: 98.6 F

## 2020-12-21 DIAGNOSIS — U07.1 COVID-19 VIRUS INFECTION: Primary | ICD-10-CM

## 2020-12-21 DIAGNOSIS — C92.41 ACUTE PROMYELOCYTIC LEUKEMIA IN REMISSION (HCC): ICD-10-CM

## 2020-12-21 PROCEDURE — 99213 OFFICE O/P EST LOW 20 MIN: CPT | Performed by: FAMILY MEDICINE

## 2020-12-24 ENCOUNTER — TELEMEDICINE (OUTPATIENT)
Dept: FAMILY MEDICINE CLINIC | Facility: CLINIC | Age: 45
End: 2020-12-24
Payer: COMMERCIAL

## 2020-12-24 ENCOUNTER — TELEPHONE (OUTPATIENT)
Dept: FAMILY MEDICINE CLINIC | Facility: CLINIC | Age: 45
End: 2020-12-24

## 2020-12-24 DIAGNOSIS — U07.1 COVID-19 VIRUS INFECTION: Primary | ICD-10-CM

## 2020-12-24 DIAGNOSIS — C92.41 ACUTE PROMYELOCYTIC LEUKEMIA IN REMISSION (HCC): ICD-10-CM

## 2020-12-24 PROCEDURE — 99213 OFFICE O/P EST LOW 20 MIN: CPT | Performed by: FAMILY MEDICINE

## 2020-12-24 PROCEDURE — 1036F TOBACCO NON-USER: CPT | Performed by: FAMILY MEDICINE

## 2020-12-27 DIAGNOSIS — I10 BENIGN ESSENTIAL HYPERTENSION: Primary | ICD-10-CM

## 2020-12-27 DIAGNOSIS — E78.5 DYSLIPIDEMIA: ICD-10-CM

## 2020-12-27 DIAGNOSIS — C92.41 ACUTE PROMYELOCYTIC LEUKEMIA IN REMISSION (HCC): ICD-10-CM

## 2020-12-28 ENCOUNTER — TELEPHONE (OUTPATIENT)
Dept: FAMILY MEDICINE CLINIC | Facility: CLINIC | Age: 45
End: 2020-12-28

## 2021-01-24 ENCOUNTER — LAB (OUTPATIENT)
Dept: LAB | Facility: HOSPITAL | Age: 46
End: 2021-01-24
Payer: COMMERCIAL

## 2021-01-24 DIAGNOSIS — E78.5 DYSLIPIDEMIA: ICD-10-CM

## 2021-01-24 DIAGNOSIS — C92.41 ACUTE PROMYELOCYTIC LEUKEMIA IN REMISSION (HCC): ICD-10-CM

## 2021-01-24 DIAGNOSIS — I10 BENIGN ESSENTIAL HYPERTENSION: ICD-10-CM

## 2021-01-24 LAB
ALBUMIN SERPL BCP-MCNC: 3.7 G/DL (ref 3.5–5)
ALP SERPL-CCNC: 70 U/L (ref 46–116)
ALT SERPL W P-5'-P-CCNC: 49 U/L (ref 12–78)
ANION GAP SERPL CALCULATED.3IONS-SCNC: 7 MMOL/L (ref 4–13)
AST SERPL W P-5'-P-CCNC: 23 U/L (ref 5–45)
BASOPHILS # BLD AUTO: 0.04 THOUSANDS/ΜL (ref 0–0.1)
BASOPHILS NFR BLD AUTO: 1 % (ref 0–1)
BILIRUB SERPL-MCNC: 0.81 MG/DL (ref 0.2–1)
BUN SERPL-MCNC: 21 MG/DL (ref 5–25)
CALCIUM SERPL-MCNC: 8.9 MG/DL (ref 8.3–10.1)
CHLORIDE SERPL-SCNC: 110 MMOL/L (ref 100–108)
CHOLEST SERPL-MCNC: 128 MG/DL (ref 50–200)
CO2 SERPL-SCNC: 25 MMOL/L (ref 21–32)
CREAT SERPL-MCNC: 0.89 MG/DL (ref 0.6–1.3)
EOSINOPHIL # BLD AUTO: 0.33 THOUSAND/ΜL (ref 0–0.61)
EOSINOPHIL NFR BLD AUTO: 6 % (ref 0–6)
ERYTHROCYTE [DISTWIDTH] IN BLOOD BY AUTOMATED COUNT: 12.7 % (ref 11.6–15.1)
GFR SERPL CREATININE-BSD FRML MDRD: 103 ML/MIN/1.73SQ M
GLUCOSE P FAST SERPL-MCNC: 92 MG/DL (ref 65–99)
HCT VFR BLD AUTO: 45.1 % (ref 36.5–49.3)
HDLC SERPL-MCNC: 36 MG/DL
HGB BLD-MCNC: 15.5 G/DL (ref 12–17)
IMM GRANULOCYTES # BLD AUTO: 0.02 THOUSAND/UL (ref 0–0.2)
IMM GRANULOCYTES NFR BLD AUTO: 0 % (ref 0–2)
LDLC SERPL CALC-MCNC: 77 MG/DL (ref 0–100)
LYMPHOCYTES # BLD AUTO: 1.44 THOUSANDS/ΜL (ref 0.6–4.47)
LYMPHOCYTES NFR BLD AUTO: 25 % (ref 14–44)
MCH RBC QN AUTO: 31.3 PG (ref 26.8–34.3)
MCHC RBC AUTO-ENTMCNC: 34.4 G/DL (ref 31.4–37.4)
MCV RBC AUTO: 91 FL (ref 82–98)
MONOCYTES # BLD AUTO: 0.56 THOUSAND/ΜL (ref 0.17–1.22)
MONOCYTES NFR BLD AUTO: 10 % (ref 4–12)
NEUTROPHILS # BLD AUTO: 3.42 THOUSANDS/ΜL (ref 1.85–7.62)
NEUTS SEG NFR BLD AUTO: 58 % (ref 43–75)
NONHDLC SERPL-MCNC: 92 MG/DL
NRBC BLD AUTO-RTO: 0 /100 WBCS
PLATELET # BLD AUTO: 234 THOUSANDS/UL (ref 149–390)
PMV BLD AUTO: 10.5 FL (ref 8.9–12.7)
POTASSIUM SERPL-SCNC: 3.8 MMOL/L (ref 3.5–5.3)
PROT SERPL-MCNC: 7.2 G/DL (ref 6.4–8.2)
RBC # BLD AUTO: 4.95 MILLION/UL (ref 3.88–5.62)
SODIUM SERPL-SCNC: 142 MMOL/L (ref 136–145)
TRIGL SERPL-MCNC: 75 MG/DL
WBC # BLD AUTO: 5.81 THOUSAND/UL (ref 4.31–10.16)

## 2021-01-24 PROCEDURE — 85025 COMPLETE CBC W/AUTO DIFF WBC: CPT

## 2021-01-24 PROCEDURE — 80061 LIPID PANEL: CPT

## 2021-01-24 PROCEDURE — 80053 COMPREHEN METABOLIC PANEL: CPT

## 2021-01-24 PROCEDURE — 36415 COLL VENOUS BLD VENIPUNCTURE: CPT

## 2021-01-28 ENCOUNTER — OFFICE VISIT (OUTPATIENT)
Dept: FAMILY MEDICINE CLINIC | Facility: CLINIC | Age: 46
End: 2021-01-28
Payer: COMMERCIAL

## 2021-01-28 VITALS
BODY MASS INDEX: 31.55 KG/M2 | OXYGEN SATURATION: 97 % | RESPIRATION RATE: 14 BRPM | TEMPERATURE: 97.9 F | WEIGHT: 213 LBS | HEIGHT: 69 IN | DIASTOLIC BLOOD PRESSURE: 84 MMHG | SYSTOLIC BLOOD PRESSURE: 134 MMHG | HEART RATE: 60 BPM

## 2021-01-28 DIAGNOSIS — C92.41 ACUTE PROMYELOCYTIC LEUKEMIA IN REMISSION (HCC): ICD-10-CM

## 2021-01-28 DIAGNOSIS — J45.20 MILD INTERMITTENT ASTHMA WITHOUT COMPLICATION: ICD-10-CM

## 2021-01-28 DIAGNOSIS — Z23 NEED FOR PNEUMOCOCCAL VACCINATION: ICD-10-CM

## 2021-01-28 DIAGNOSIS — Z00.00 WELL ADULT EXAM: Primary | ICD-10-CM

## 2021-01-28 DIAGNOSIS — I10 BENIGN ESSENTIAL HYPERTENSION: ICD-10-CM

## 2021-01-28 DIAGNOSIS — E78.5 DYSLIPIDEMIA: ICD-10-CM

## 2021-01-28 PROCEDURE — 3079F DIAST BP 80-89 MM HG: CPT | Performed by: FAMILY MEDICINE

## 2021-01-28 PROCEDURE — 1036F TOBACCO NON-USER: CPT | Performed by: FAMILY MEDICINE

## 2021-01-28 PROCEDURE — 99396 PREV VISIT EST AGE 40-64: CPT | Performed by: FAMILY MEDICINE

## 2021-01-28 PROCEDURE — 3008F BODY MASS INDEX DOCD: CPT | Performed by: FAMILY MEDICINE

## 2021-01-28 PROCEDURE — 3075F SYST BP GE 130 - 139MM HG: CPT | Performed by: FAMILY MEDICINE

## 2021-01-28 PROCEDURE — 90471 IMMUNIZATION ADMIN: CPT

## 2021-01-28 PROCEDURE — 90732 PPSV23 VACC 2 YRS+ SUBQ/IM: CPT

## 2021-01-28 PROCEDURE — 3725F SCREEN DEPRESSION PERFORMED: CPT | Performed by: FAMILY MEDICINE

## 2021-01-28 RX ORDER — DIPHENOXYLATE HYDROCHLORIDE AND ATROPINE SULFATE 2.5; .025 MG/1; MG/1
1 TABLET ORAL DAILY
COMMUNITY

## 2021-01-28 RX ORDER — MELATONIN
2000 DAILY
COMMUNITY

## 2021-01-28 NOTE — ASSESSMENT & PLAN NOTE
BP is stable  Continue Benazepril 20 mg daily, HCTZ 12 5 mg daily  Follow a low-sodium diet  Check blood pressure at home twice a week and call with blood pressure log in 4 weeks

## 2021-01-28 NOTE — ASSESSMENT & PLAN NOTE
Recommended to follow a well-balanced diet, regular exercise  Pneumovax administered today  Discussed Covid vaccine  Explained to patient that he can wait 3 months after recovery from COVID-19 virus infection in December

## 2021-01-28 NOTE — PROGRESS NOTES
Chief Complaint   Patient presents with   Bertha Carnes Physical Exam     Annual Physical     Health Maintenance   Topic Date Due    HIV Screening  07/23/1989    Annual Physical  07/23/1992    BMI: Followup Plan  07/22/2020    Depression Screening PHQ  01/28/2022    BMI: Adult  01/28/2022    Pneumococcal Vaccine: Pediatrics (0 to 5 Years) and At-Risk Patients (6 to 59 Years) (3 of 3 - PCV13) 01/28/2022    DTaP,Tdap,and Td Vaccines (2 - Td) 07/04/2030    Influenza Vaccine  Completed    HIB Vaccine  Aged Out    Hepatitis B Vaccine  Aged Out    IPV Vaccine  Aged Out    Hepatitis A Vaccine  Aged Out    Meningococcal ACWY Vaccine  Aged Out    HPV Vaccine  Aged Out         BMI Counseling: Body mass index is 31 92 kg/m²  The BMI is above normal  Nutrition recommendations include decreasing portion sizes, encouraging healthy choices of fruits and vegetables, decreasing fast food intake, consuming healthier snacks, limiting drinks that contain sugar, moderation in carbohydrate intake, increasing intake of lean protein, reducing intake of saturated and trans fat and reducing intake of cholesterol  Exercise recommendations include exercising 3-5 times per week  No pharmacotherapy was ordered  Assessment/Plan:    Well adult exam  Recommended to follow a well-balanced diet, regular exercise  Pneumovax administered today  Discussed Covid vaccine  Explained to patient that he can wait 3 months after recovery from COVID-19 virus infection in December  Benign essential hypertension  BP is stable  Continue Benazepril 20 mg daily, HCTZ 12 5 mg daily  Follow a low-sodium diet  Check blood pressure at home twice a week and call with blood pressure log in 4 weeks  Dyslipidemia  Goal for HDL > 40  Follow a low-cholesterol diet, increase exercise  Mild intermittent asthma without complication  Symptoms are stable  Use Ventolin HFA inhaler PRN        Acute promyelocytic leukemia in remission  CBC with dif -within normal range  Follow-up with hematology -oncology Dr Jennifer Goode  Schedule follow-up visit in 6 months  Check labs prior to next visit  Diagnoses and all orders for this visit:    Well adult exam    Benign essential hypertension  -     Comprehensive metabolic panel; Future    Dyslipidemia  -     Comprehensive metabolic panel; Future  -     Lipid panel; Future    Mild intermittent asthma without complication    Acute promyelocytic leukemia in remission ([facility] )    Need for pneumococcal vaccination  -     PNEUMOCOCCAL POLYSACCHARIDE VACCINE 23-VALENT =>3YO SQ IM    Other orders  -     cholecalciferol (VITAMIN D3) 1,000 units tablet; Take 2,000 Units by mouth daily  -     multivitamin (THERAGRAN) TABS; Take 1 tablet by mouth daily          Subjective:      Patient ID: iMke Ramos is a 54 y o  male  HPI    Patient presents for annual physical exam     PMHx: HTN, dyslipidemia, asthma, seasonal allergies, acute promyelocytic leukemia in remission  Reviewed current medications, blood test results from January 24, 2021  CBC with dif - within normal range  Fasting blood sugar 92, creatinine 0 89, potassium 3 8  Cholesterol 128, triglycerides 75, LDL 77, HDL 36  Patient works full-time  He recovered from [place] - 19 virus infection in December 2020  HTN - currently taking Benazepril 20 mg daily, HCTZ 12 5 mg daily  Denies chest pain, shortness of breath, dizziness  Asthma - symptoms are stable  Patient has not been using rescue inhaler  for the past month  Denies tobacco use  Drinks alcohol occasionally  Patient had Flu shot in November 2020, Pneumovax in 10/15  He had Tdap in 7/2020  Patient denies family history of colon cancer or prostate cancer      The following portions of the patient's history were reviewed and updated as appropriate: allergies, past family history, past medical history, past social history, past surgical history and problem list     Review of Systems   Constitutional: Negative for activity change, appetite change, chills, fatigue and fever  HENT: Negative for congestion, ear pain, hearing loss, mouth sores, nosebleeds, postnasal drip, sore throat, tinnitus and voice change  Eyes: Negative for pain, discharge, redness, itching and visual disturbance  Respiratory: Negative for cough, chest tightness, shortness of breath and wheezing  Cardiovascular: Negative for chest pain, palpitations and leg swelling  Gastrointestinal: Negative for abdominal pain, blood in stool, constipation, diarrhea, nausea and vomiting  Genitourinary: Negative for difficulty urinating, dysuria, flank pain, frequency and hematuria  Musculoskeletal: Negative for arthralgias, back pain, joint swelling, myalgias and neck pain  Skin: Negative for pallor  Neurological: Negative for dizziness, syncope and headaches  Hematological: Negative  Psychiatric/Behavioral: Negative for dysphoric mood and sleep disturbance  The patient is not nervous/anxious  Objective:      /84 (BP Location: Left arm, Patient Position: Sitting, Cuff Size: Standard)   Pulse 60   Temp 97 9 °F (36 6 °C) (Tympanic)   Resp 14   Ht 5' 8 5" (1 74 m)   Wt 96 6 kg (213 lb)   SpO2 97%   BMI 31 92 kg/m²          Physical Exam  Vitals signs and nursing note reviewed  Constitutional:       Appearance: Normal appearance  He is obese  HENT:      Head: Normocephalic and atraumatic  Right Ear: Tympanic membrane and external ear normal       Left Ear: Tympanic membrane and external ear normal    Eyes:      Conjunctiva/sclera: Conjunctivae normal       Pupils: Pupils are equal, round, and reactive to light  Neck:      Musculoskeletal: Normal range of motion and neck supple  Vascular: No carotid bruit  Cardiovascular:      Rate and Rhythm: Normal rate and regular rhythm  Heart sounds: No murmur     Pulmonary:      Effort: Pulmonary effort is normal       Breath sounds: Normal breath sounds  Abdominal:      General: Bowel sounds are normal  There is no distension  Palpations: Abdomen is soft  Tenderness: There is no abdominal tenderness  Musculoskeletal: Normal range of motion  General: No swelling, tenderness or deformity  Right lower leg: No edema  Left lower leg: No edema  Skin:     General: Skin is warm and dry  Findings: No rash  Neurological:      General: No focal deficit present  Mental Status: He is alert  Cranial Nerves: No cranial nerve deficit  Motor: No weakness        Coordination: Coordination normal    Psychiatric:         Mood and Affect: Mood normal

## 2021-02-06 ENCOUNTER — NURSE TRIAGE (OUTPATIENT)
Dept: OTHER | Facility: OTHER | Age: 46
End: 2021-02-06

## 2021-02-06 DIAGNOSIS — J06.9 ACUTE UPPER RESPIRATORY INFECTION: Primary | ICD-10-CM

## 2021-02-06 RX ORDER — AZITHROMYCIN 250 MG/1
TABLET, FILM COATED ORAL
Qty: 6 TABLET | Refills: 0 | Status: SHIPPED | OUTPATIENT
Start: 2021-02-06 | End: 2021-02-11

## 2021-02-06 NOTE — TELEPHONE ENCOUNTER
Regarding: running nose, would like medication  ----- Message from [employer] sent at 2/6/2021  2:02 PM EST -----  Stuffy nose and cough ever since this morning , and would like to see if they can get something prescribed for them  Spoke with pt wanted an antibiotic for symptoms and so I paged and called on call provider no answer, also called office manger no answer  ;astly I reached out to ras's PCP and she ordered pt an abx and had it sent tover to his pharmacy

## 2021-02-06 NOTE — TELEPHONE ENCOUNTER
Answer Assessment - Initial Assessment Questions  1  ONSET: "When did the nasal discharge start?"       2/2/21  2  AMOUNT: "How much discharge is there?"       Some Mucus comes out    3  COUGH: "Do you have a cough?" If yes, ask: "Describe the color of your sputum" (clear, white, yellow, green)      Coughing spells   4  RESPIRATORY DISTRESS: "Describe your breathing "       Chest is tight and has to use inhaler   5  FEVER: "Do you have a fever?" If so, ask: "What is your temperature, how was it measured, and when did it start?"      No fever   6  SEVERITY: "Overall, how bad are you feeling right now?" (e g , doesn't interfere with normal activities, staying home from school/work, staying in bed)       Resting at home today   7  OTHER SYMPTOMS: "Do you have any other symptoms?" (e g , sore throat, earache, wheezing, vomiting)      Sore throat, stuffy nose, coughing spell   8   PREGNANCY: "Is there any chance you are pregnant?" "When was your last menstrual period?"      no    Protocols used: COMMON COLD-ADULT-

## 2021-03-08 ENCOUNTER — TELEPHONE (OUTPATIENT)
Dept: HEMATOLOGY ONCOLOGY | Facility: CLINIC | Age: 46
End: 2021-03-08

## 2021-03-08 NOTE — TELEPHONE ENCOUNTER
Patient calling to see if he can be scheduled for the COVID vaccine  I reviewed [facility] is currently scheduling patients who are 79 and older  Patient will reach out to other facilities to see if he would be able to get in sooner  He is aware that he will get notification from [facility] once he is able to schedule

## 2021-03-10 DIAGNOSIS — Z23 ENCOUNTER FOR IMMUNIZATION: ICD-10-CM

## 2021-03-18 ENCOUNTER — IMMUNIZATIONS (OUTPATIENT)
Dept: FAMILY MEDICINE CLINIC | Facility: HOSPITAL | Age: 46
End: 2021-03-18

## 2021-03-18 DIAGNOSIS — Z23 ENCOUNTER FOR IMMUNIZATION: Primary | ICD-10-CM

## 2021-03-18 PROCEDURE — 0001A SARS-COV-2 / COVID-19 MRNA VACCINE (PFIZER-BIONTECH) 30 MCG: CPT

## 2021-03-18 PROCEDURE — 91300 SARS-COV-2 / COVID-19 MRNA VACCINE (PFIZER-BIONTECH) 30 MCG: CPT

## 2021-04-08 ENCOUNTER — IMMUNIZATIONS (OUTPATIENT)
Dept: FAMILY MEDICINE CLINIC | Facility: HOSPITAL | Age: 46
End: 2021-04-08

## 2021-04-08 DIAGNOSIS — Z23 ENCOUNTER FOR IMMUNIZATION: Primary | ICD-10-CM

## 2021-04-08 PROCEDURE — 0002A SARS-COV-2 / COVID-19 MRNA VACCINE (PFIZER-BIONTECH) 30 MCG: CPT

## 2021-04-08 PROCEDURE — 91300 SARS-COV-2 / COVID-19 MRNA VACCINE (PFIZER-BIONTECH) 30 MCG: CPT

## 2021-04-13 ENCOUNTER — TELEPHONE (OUTPATIENT)
Dept: HEMATOLOGY ONCOLOGY | Facility: CLINIC | Age: 46
End: 2021-04-13

## 2021-04-13 NOTE — TELEPHONE ENCOUNTER
Reschedule Appointment     Who is calling in Patient    Doctor Appointment Scheduled with Dr Tristen Zacarias date and time 06/17 at 3:00pm   New date and time 06/07 at 3:20pm   Location [place]   Patient verbalized understanding    yes

## 2021-05-10 ENCOUNTER — TELEPHONE (OUTPATIENT)
Dept: HEMATOLOGY ONCOLOGY | Facility: CLINIC | Age: 46
End: 2021-05-10

## 2021-05-10 NOTE — TELEPHONE ENCOUNTER
I phoned the patient to discuss with him that his appointment with Dr Kaitlynn Lees on 6/3 would have to be rescheduled, as Dr Kaitlynn Lees will not be in the office that day  The patient wishes to stay with Dr Kaitlynn Lees, so his appointment was rescheduled to 6/10 at [address]  The patient was agreeable to this change

## 2021-05-27 ENCOUNTER — APPOINTMENT (OUTPATIENT)
Dept: LAB | Facility: HOSPITAL | Age: 46
End: 2021-05-27
Attending: INTERNAL MEDICINE
Payer: COMMERCIAL

## 2021-05-27 ENCOUNTER — TRANSCRIBE ORDERS (OUTPATIENT)
Dept: LAB | Facility: HOSPITAL | Age: 46
End: 2021-05-27

## 2021-05-27 DIAGNOSIS — C92.41 ACUTE PROMYELOCYTIC LEUKEMIA IN REMISSION (HCC): ICD-10-CM

## 2021-05-27 LAB
ALBUMIN SERPL BCP-MCNC: 3.4 G/DL (ref 3.5–5)
ALP SERPL-CCNC: 66 U/L (ref 46–116)
ALT SERPL W P-5'-P-CCNC: 33 U/L (ref 12–78)
ANION GAP SERPL CALCULATED.3IONS-SCNC: 3 MMOL/L (ref 4–13)
AST SERPL W P-5'-P-CCNC: 18 U/L (ref 5–45)
BASOPHILS # BLD AUTO: 0.04 THOUSANDS/ΜL (ref 0–0.1)
BASOPHILS NFR BLD AUTO: 1 % (ref 0–1)
BILIRUB SERPL-MCNC: 1.01 MG/DL (ref 0.2–1)
BUN SERPL-MCNC: 19 MG/DL (ref 5–25)
CALCIUM ALBUM COR SERPL-MCNC: 9.1 MG/DL (ref 8.3–10.1)
CALCIUM SERPL-MCNC: 8.6 MG/DL (ref 8.3–10.1)
CHLORIDE SERPL-SCNC: 110 MMOL/L (ref 100–108)
CO2 SERPL-SCNC: 29 MMOL/L (ref 21–32)
CREAT SERPL-MCNC: 0.83 MG/DL (ref 0.6–1.3)
EOSINOPHIL # BLD AUTO: 0.29 THOUSAND/ΜL (ref 0–0.61)
EOSINOPHIL NFR BLD AUTO: 5 % (ref 0–6)
ERYTHROCYTE [DISTWIDTH] IN BLOOD BY AUTOMATED COUNT: 11.9 % (ref 11.6–15.1)
GFR SERPL CREATININE-BSD FRML MDRD: 106 ML/MIN/1.73SQ M
GLUCOSE P FAST SERPL-MCNC: 97 MG/DL (ref 65–99)
HCT VFR BLD AUTO: 44.9 % (ref 36.5–49.3)
HGB BLD-MCNC: 15.3 G/DL (ref 12–17)
IMM GRANULOCYTES # BLD AUTO: 0.02 THOUSAND/UL (ref 0–0.2)
IMM GRANULOCYTES NFR BLD AUTO: 0 % (ref 0–2)
LYMPHOCYTES # BLD AUTO: 1.43 THOUSANDS/ΜL (ref 0.6–4.47)
LYMPHOCYTES NFR BLD AUTO: 25 % (ref 14–44)
MCH RBC QN AUTO: 31.9 PG (ref 26.8–34.3)
MCHC RBC AUTO-ENTMCNC: 34.1 G/DL (ref 31.4–37.4)
MCV RBC AUTO: 94 FL (ref 82–98)
MONOCYTES # BLD AUTO: 0.53 THOUSAND/ΜL (ref 0.17–1.22)
MONOCYTES NFR BLD AUTO: 9 % (ref 4–12)
NEUTROPHILS # BLD AUTO: 3.42 THOUSANDS/ΜL (ref 1.85–7.62)
NEUTS SEG NFR BLD AUTO: 60 % (ref 43–75)
NRBC BLD AUTO-RTO: 0 /100 WBCS
PLATELET # BLD AUTO: 214 THOUSANDS/UL (ref 149–390)
PMV BLD AUTO: 10.6 FL (ref 8.9–12.7)
POTASSIUM SERPL-SCNC: 3.5 MMOL/L (ref 3.5–5.3)
PROT SERPL-MCNC: 6.7 G/DL (ref 6.4–8.2)
RBC # BLD AUTO: 4.79 MILLION/UL (ref 3.88–5.62)
SODIUM SERPL-SCNC: 142 MMOL/L (ref 136–145)
WBC # BLD AUTO: 5.73 THOUSAND/UL (ref 4.31–10.16)

## 2021-05-27 PROCEDURE — 85025 COMPLETE CBC W/AUTO DIFF WBC: CPT

## 2021-05-27 PROCEDURE — 36415 COLL VENOUS BLD VENIPUNCTURE: CPT

## 2021-05-27 PROCEDURE — 81315 PML/RARALPHA COM BREAKPOINTS: CPT

## 2021-05-27 PROCEDURE — 80053 COMPREHEN METABOLIC PANEL: CPT

## 2021-06-01 LAB — MISCELLANEOUS LAB TEST RESULT: NORMAL

## 2021-06-10 ENCOUNTER — OFFICE VISIT (OUTPATIENT)
Dept: HEMATOLOGY ONCOLOGY | Facility: CLINIC | Age: 46
End: 2021-06-10
Payer: COMMERCIAL

## 2021-06-10 VITALS
WEIGHT: 210.8 LBS | TEMPERATURE: 97.3 F | HEART RATE: 84 BPM | SYSTOLIC BLOOD PRESSURE: 134 MMHG | DIASTOLIC BLOOD PRESSURE: 76 MMHG | BODY MASS INDEX: 31.22 KG/M2 | OXYGEN SATURATION: 98 % | RESPIRATION RATE: 18 BRPM | HEIGHT: 69 IN

## 2021-06-10 DIAGNOSIS — C92.41 ACUTE PROMYELOCYTIC LEUKEMIA IN REMISSION (HCC): Primary | ICD-10-CM

## 2021-06-10 PROCEDURE — 99213 OFFICE O/P EST LOW 20 MIN: CPT | Performed by: INTERNAL MEDICINE

## 2021-06-10 PROCEDURE — 3008F BODY MASS INDEX DOCD: CPT | Performed by: INTERNAL MEDICINE

## 2021-06-10 NOTE — PROGRESS NOTES
[facility]  [address] [address]  [phone]    Lisbet Moreland,1975, [id number]  06/10/21    Discussion:   Summary, this is a 69-year-old male history of APL as outlined  Clinically he is doing well  He generally functions without restriction  Recent CBC and CMP are normal   PML/Kathy erik not detected  We reviewed that continued follow-up is optional   He elects yearly follow-up  He has made significant strides in fundraising for the leukemia society  I discussed the above with the patient  The patient  voiced understanding and agreement   ______________________________________________________________________    Chief Complaint   Patient presents with    Follow-up       HPI:  Oncology History   Acute promyelocytic leukemia in remission ([facility] )   6/9/2015 Initial Diagnosis    Acute promyelocytic leukemia in remission [facility])  Patient presented to the hospital with bruising going on for a week or more  White blood cell count 51 9, hemoglobin 11 7, platelet count 17, 81% blasts  Fibrinogen 73, INR 1 96, PTT 31       6/10/2015 Biopsy    Flow cytometry was consistent with a diagnosis of acute promyelocytic leukemia  6/16/2015 -  Chemotherapy    Patient was started on ATRA 45 mg per meter squared in divided doses  Idarubicin was started 6/16/15 and administered 12 mg per meter squared days +2, +4, +6, pulse 8  On day #9 arsenic trioxide 0 15 mg per kilogram was initiated and continued daily  Treatment was complicated by stomatitis, pulmonary infiltrate (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 syndrome)  He was treated with a variety of antibiotics including acyclovir Mycafungin, cefuroxime  He was treated with allopurinol and potassium  8/24/2015 - 2/2016 Chemotherapy    ATRA 2 weeks on, 2 weeks off,   Trisenox 4 weeks on, 4 weeks off       3/10/2016 Remission           Interval History:  Clinically stable    ECOG-  0 - Asymptomatic    Review of Systems   Constitutional: Negative for chills and fever  HENT: Negative for nosebleeds  Eyes: Negative for discharge  Respiratory: Negative for cough and shortness of breath  Cardiovascular: Negative for chest pain  Gastrointestinal: Negative for abdominal pain, constipation and diarrhea  Endocrine: Negative for polydipsia  Genitourinary: Negative for hematuria  Musculoskeletal: Negative for arthralgias  Skin: Negative for color change  Allergic/Immunologic: Negative for immunocompromised state  Neurological: Negative for dizziness and headaches  Hematological: Negative for adenopathy  Psychiatric/Behavioral: Negative for agitation         Past Medical History:   Diagnosis Date    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NAKUL M3) ([facility] )     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HCC)     Allergic rhinitis     Asthma     Essential hypertension, benign     Leukemia ([facility] )     Obesity     Personal history of other diseases of respiratory system      Patient Active Problem List   Diagnosis    Acute promyelocytic leukemia in remission ([facility] )    Allergic rhinitis    Benign essential hypertension    Seasonal allergies    Persistent dry cough    Other viral warts    Facial skin lesion    Bronchospasm    Mild obesity    Mild intermittent asthma without complication    Dyslipidemia    COVID-19 virus infection    Well adult exam       Current Outpatient Medications:     benazepril (LOTENSIN) 20 mg tablet, Take 1 tablet (20 mg total) by mouth daily, Disp: 90 tablet, Rfl: 2    cholecalciferol (VITAMIN D3) 1,000 units tablet, Take 2,000 Units by mouth daily, Disp: , Rfl:     hydrochlorothiazide (HYDRODIURIL) 12 5 mg tablet, Take 1 tablet (12 5 mg total) by mouth daily, Disp: 90 tablet, Rfl: 2    multivitamin (THERAGRAN) TABS, Take 1 tablet by mouth daily, Disp: , Rfl:     albuterol (VENTOLIN HFA) 90 mcg/act inhaler, Use 2 puffs every 4-6 hr  PRN for chest tightness or wheezing (Patient not taking: Reported on 1/28/2021), Disp: 1 Inhaler, Rfl: 3  Allergies   Allergen Reactions    Penicillins Hives     Past Surgical History:   Procedure Laterality Date    BONE MARROW BIOPSY W/ ASPIRATION N/A     PORTACATH PLACEMENT Right     WISDOM TOOTH EXTRACTION       Social History     Objective:  Vitals:    06/10/21 0843   BP: 134/76   BP Location: Right arm   Patient Position: Sitting   Pulse: 84   Resp: 18   Temp: (!) 97 3 °F (36 3 °C)   TempSrc: Tympanic   SpO2: 98%   Weight: 95 6 kg (210 lb 12 8 oz)   Height: 5' 8 5" (1 74 m)     Physical Exam  Constitutional:       Appearance: He is well-developed  HENT:      Head: Normocephalic and atraumatic  Eyes:      Pupils: Pupils are equal, round, and reactive to light  Neck:      Musculoskeletal: Neck supple  Cardiovascular:      Rate and Rhythm: Normal rate and regular rhythm  Heart sounds: No murmur  Pulmonary:      Breath sounds: Normal breath sounds  No wheezing or rales  Abdominal:      Palpations: Abdomen is soft  Tenderness: There is no abdominal tenderness  Musculoskeletal: Normal range of motion  General: No tenderness  Lymphadenopathy:      Cervical: No cervical adenopathy  Skin:     Findings: No erythema or rash  Neurological:      Mental Status: He is alert and oriented to person, place, and time  Cranial Nerves: No cranial nerve deficit  Deep Tendon Reflexes: Reflexes are normal and symmetric  Psychiatric:         Behavior: Behavior normal            Labs: I personally reviewed the labs and imaging pertinent to this patient care

## 2021-07-27 ENCOUNTER — RA CDI HCC (OUTPATIENT)
Dept: OTHER | Facility: HOSPITAL | Age: 46
End: 2021-07-27

## 2021-07-27 NOTE — PROGRESS NOTES
[facility]  coding opportunities          Chart reviewed, no opportunity found: CHART REVIEWED, NO OPPORTUNITY FOUND                     Patients insurance company: [employer] (Medicare Advantage and Commercial)

## 2021-08-21 DIAGNOSIS — I10 BENIGN ESSENTIAL HYPERTENSION: ICD-10-CM

## 2021-08-23 RX ORDER — BENAZEPRIL HYDROCHLORIDE 20 MG/1
TABLET ORAL
Qty: 90 TABLET | Refills: 2 | Status: SHIPPED | OUTPATIENT
Start: 2021-08-23 | End: 2022-06-08

## 2021-08-23 RX ORDER — HYDROCHLOROTHIAZIDE 12.5 MG/1
TABLET ORAL
Qty: 90 TABLET | Refills: 2 | Status: SHIPPED | OUTPATIENT
Start: 2021-08-23 | End: 2022-06-08

## 2021-10-21 ENCOUNTER — IMMUNIZATIONS (OUTPATIENT)
Dept: FAMILY MEDICINE CLINIC | Facility: HOSPITAL | Age: 46
End: 2021-10-21

## 2021-10-21 DIAGNOSIS — Z23 ENCOUNTER FOR IMMUNIZATION: Primary | ICD-10-CM

## 2021-10-21 PROCEDURE — 0001A COVID-19 PFIZER VACC 0.3 ML: CPT

## 2021-10-21 PROCEDURE — 91300 COVID-19 PFIZER VACC 0.3 ML: CPT

## 2021-11-12 ENCOUNTER — RA CDI HCC (OUTPATIENT)
Dept: OTHER | Facility: HOSPITAL | Age: 46
End: 2021-11-12

## 2021-11-18 ENCOUNTER — RA CDI HCC (OUTPATIENT)
Dept: OTHER | Facility: HOSPITAL | Age: 46
End: 2021-11-18

## 2021-11-26 ENCOUNTER — OFFICE VISIT (OUTPATIENT)
Dept: FAMILY MEDICINE CLINIC | Facility: CLINIC | Age: 46
End: 2021-11-26
Payer: COMMERCIAL

## 2021-11-26 VITALS
WEIGHT: 214 LBS | TEMPERATURE: 99 F | HEIGHT: 69 IN | HEART RATE: 92 BPM | SYSTOLIC BLOOD PRESSURE: 120 MMHG | BODY MASS INDEX: 31.7 KG/M2 | DIASTOLIC BLOOD PRESSURE: 72 MMHG | RESPIRATION RATE: 16 BRPM

## 2021-11-26 DIAGNOSIS — R06.83 SNORING: Primary | ICD-10-CM

## 2021-11-26 DIAGNOSIS — Z23 ENCOUNTER FOR IMMUNIZATION: ICD-10-CM

## 2021-11-26 PROCEDURE — 1036F TOBACCO NON-USER: CPT | Performed by: FAMILY MEDICINE

## 2021-11-26 PROCEDURE — 90471 IMMUNIZATION ADMIN: CPT

## 2021-11-26 PROCEDURE — 99213 OFFICE O/P EST LOW 20 MIN: CPT | Performed by: FAMILY MEDICINE

## 2021-11-26 PROCEDURE — 90682 RIV4 VACC RECOMBINANT DNA IM: CPT

## 2021-11-26 PROCEDURE — 3008F BODY MASS INDEX DOCD: CPT | Performed by: FAMILY MEDICINE

## 2022-01-26 ENCOUNTER — RA CDI HCC (OUTPATIENT)
Dept: OTHER | Facility: HOSPITAL | Age: 47
End: 2022-01-26

## 2022-01-26 NOTE — PROGRESS NOTES
The following dx found on active problem list - please assess using MEAT for  billing    Acute promyelocytic leukemia in remission ([facility] [address] ) [C92 41]    [facility]  coding opportunities          Number of diagnosis code(s) already on the problem list added to FYI fla                     Patients insurance company: [employer] (Medicare Advantage and Commercial)             [facility]  coding opportunities          Number of diagnosis code(s) already on the problem list added to [employer] fla                  Number of suggestions NOT actually used: 1     Patients insurance company: [employer] ([employer])     Visit status: Patient canceled the appointment

## 2022-03-27 ENCOUNTER — HOSPITAL ENCOUNTER (EMERGENCY)
Facility: HOSPITAL | Age: 47
Discharge: HOME | End: 2022-03-27
Attending: EMERGENCY MEDICINE | Admitting: EMERGENCY MEDICINE
Payer: COMMERCIAL

## 2022-03-27 VITALS
OXYGEN SATURATION: 98 % | TEMPERATURE: 97.1 F | HEART RATE: 84 BPM | RESPIRATION RATE: 16 BRPM | DIASTOLIC BLOOD PRESSURE: 77 MMHG | SYSTOLIC BLOOD PRESSURE: 125 MMHG

## 2022-03-27 DIAGNOSIS — J45.901 ASTHMA EXACERBATION: Primary | ICD-10-CM

## 2022-03-27 PROCEDURE — 99283 EMERGENCY DEPT VISIT LOW MDM: CPT

## 2022-03-27 PROCEDURE — 94640 AIRWAY INHALATION TREATMENT: CPT

## 2022-03-27 PROCEDURE — 99284 EMERGENCY DEPT VISIT MOD MDM: CPT | Performed by: EMERGENCY MEDICINE

## 2022-03-27 RX ORDER — ALBUTEROL SULFATE 2.5 MG/3ML
5 SOLUTION RESPIRATORY (INHALATION) ONCE
Status: COMPLETED | OUTPATIENT
Start: 2022-03-27 | End: 2022-03-27

## 2022-03-27 RX ORDER — PREDNISONE 20 MG/1
40 TABLET ORAL DAILY
Qty: 10 TABLET | Refills: 0 | Status: SHIPPED | OUTPATIENT
Start: 2022-03-27 | End: 2022-04-01

## 2022-03-27 RX ADMIN — PREDNISONE 50 MG: 20 TABLET ORAL at 19:11

## 2022-03-27 RX ADMIN — IPRATROPIUM BROMIDE 0.5 MG: 0.5 SOLUTION RESPIRATORY (INHALATION) at 19:12

## 2022-03-27 RX ADMIN — ALBUTEROL SULFATE 5 MG: 2.5 SOLUTION RESPIRATORY (INHALATION) at 19:11

## 2022-03-27 NOTE — ED ATTENDING ATTESTATION
3/27/2022  IMillie DO, saw and evaluated the patient  I have discussed the patient with the resident/non-physician practitioner and agree with the resident's/non-physician practitioner's findings, Plan of Care, and MDM as documented in the resident's/non-physician practitioner's note, except where noted  All available labs and Radiology studies were reviewed  I was present for key portions of any procedure(s) performed by the resident/non-physician practitioner and I was immediately available to provide assistance  At this point I agree with the current assessment done in the Emergency Department  I have conducted an independent evaluation of this patient a history and physical is as follows:    51 yom with asthma p/w dyspnea  Wheezing for a few days  Persistent  Using rescue inhaler with minimal relief  No f/c  Dry cough  No other assoc sx, no cp  Wheezing on exam but NAD   Plan albuterol, steroid    ED Course         Critical Care Time  Procedures

## 2022-03-27 NOTE — ED PROVIDER NOTES
History  Chief Complaint   Patient presents with    Asthma     for the last few days has had trouble with controlling his asthma with his inhalers  48-year-old male with history of asthma presents to the emergency department for evaluation of a suspected asthma exacerbation  The patient reports that he has been having worsening symptoms over the past 4 days including chest tightness and wheezing  The patient states that he has been using his albuterol meter dose inhaler but states that his symptoms have not been getting better so came to the emergency department for further evaluation  The patient states that he has never been hospitalized for his asthma and has never been intubated  The patient states that he is not a smoker  The patient denies fevers, chills, nausea, vomiting, diarrhea, sick contacts, recent travel, lower leg pain/swelling or chest pain  Prior to Admission Medications   Prescriptions Last Dose Informant Patient Reported? Taking?    albuterol (VENTOLIN HFA) 90 mcg/act inhaler  Self No No   Sig: Use 2 puffs every 4-6 hr  PRN for chest tightness or wheezing   benazepril (LOTENSIN) 20 mg tablet  Self No No   Sig: TAKE 1 TABLET BY MOUTH EVERY DAY   cholecalciferol (VITAMIN D3) 1,000 units tablet  Self Yes No   Sig: Take 2,000 Units by mouth daily   hydrochlorothiazide (HYDRODIURIL) 12 5 mg tablet  Self No No   Sig: TAKE 1 TABLET BY MOUTH EVERY DAY   multivitamin (THERAGRAN) TABS  Self Yes No   Sig: Take 1 tablet by mouth daily      Facility-Administered Medications: None       Past Medical History:   Diagnosis Date    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NAKUL M3) (HCC)     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HCC)     Allergic rhinitis     Asthma     Essential hypertension, benign     Leukemia (HCC)     Obesity     Personal history of other diseases of respiratory system        Past Surgical History:   Procedure Laterality Date    BONE MARROW BIOPSY W/ ASPIRATION N/A     PORTACATH PLACEMENT Right     WISDOM TOOTH EXTRACTION         Family History   Problem Relation Age of Onset    Hypertension Father      I have reviewed and agree with the history as documented  E-Cigarette/Vaping    E-Cigarette Use Never User      E-Cigarette/Vaping Substances     Social History     Tobacco Use    Smoking status: Never Smoker    Smokeless tobacco: Never Used   Vaping Use    Vaping Use: Never used   Substance Use Topics    Alcohol use: No    Drug use: No        Review of Systems   Constitutional: Negative for chills and fever  HENT: Negative for ear pain and sore throat  Eyes: Negative for pain and visual disturbance  Respiratory: Positive for chest tightness and wheezing  Negative for cough and shortness of breath  Cardiovascular: Negative for chest pain and palpitations  Gastrointestinal: Negative for abdominal pain and vomiting  Genitourinary: Negative for dysuria and hematuria  Musculoskeletal: Negative for arthralgias and back pain  Skin: Negative for color change and rash  Neurological: Negative for seizures and syncope  All other systems reviewed and are negative  Physical Exam  ED Triage Vitals   Temperature Pulse Respirations Blood Pressure SpO2   03/27/22 1743 03/27/22 1743 03/27/22 1743 03/27/22 1743 03/27/22 1743   (!) 97 1 °F (36 2 °C) 80 20 147/97 96 %      Temp Source Heart Rate Source Patient Position - Orthostatic VS BP Location FiO2 (%)   03/27/22 1743 03/27/22 1916 03/27/22 1916 03/27/22 1916 --   Temporal Monitor Sitting Right arm       Pain Score       03/27/22 1743       1             Orthostatic Vital Signs  Vitals:    03/27/22 1743 03/27/22 1916 03/27/22 2000   BP: 147/97 118/78 125/77   Pulse: 80 74 84   Patient Position - Orthostatic VS:  Sitting Sitting       Physical Exam  Vitals and nursing note reviewed  Constitutional:       Appearance: He is well-developed  HENT:      Head: Normocephalic and atraumatic     Eyes:      Conjunctiva/sclera: Conjunctivae normal    Cardiovascular:      Rate and Rhythm: Normal rate and regular rhythm  Heart sounds: No murmur heard  Pulmonary:      Effort: Pulmonary effort is normal  Prolonged expiration present  No respiratory distress  Breath sounds: Wheezing present  Abdominal:      Palpations: Abdomen is soft  Tenderness: There is no abdominal tenderness  Musculoskeletal:      Cervical back: Neck supple  Skin:     General: Skin is warm and dry  Neurological:      Mental Status: He is alert  ED Medications  Medications   albuterol inhalation solution 5 mg (5 mg Nebulization Given 3/27/22 1911)   ipratropium (ATROVENT) 0 02 % inhalation solution 0 5 mg (0 5 mg Nebulization Given 3/27/22 1912)   predniSONE tablet 50 mg (50 mg Oral Given 3/27/22 1911)       Diagnostic Studies  Results Reviewed     None                 No orders to display         Procedures  Procedures      ED Course                                       MDM  Number of Diagnoses or Management Options  Asthma exacerbation  Diagnosis management comments: 41-year-old male presented to the emergency department for evaluation of a suspected asthma exacerbation  On arrival the patient was awake, alert, oriented and in no acute distress  On exam the patient was noted to have diffuse wheezing bilaterally  The patient was treated with a DuoNeb as well as a dose of steroids  On re-evaluation the patient reported improvement of his symptoms  He is appropriate for discharge at this time  The patient was given a prescription for a steroid burst with recommendation to follow up with his PCP for continued symptoms  Return precautions were discussed  Patient agrees with the plan for discharge and feels comfortable to go home with proper f/u  Advised to return for worsening or additional problems  Diagnostic tests were reviewed and questions answered  Diagnosis, care plan and treatment options were discussed   The patient understands instructions and will follow up as directed  Disposition  Final diagnoses:   Asthma exacerbation     Time reflects when diagnosis was documented in both MDM as applicable and the Disposition within this note     Time User Action Codes Description Comment    3/27/2022  7:13 PM Tammie Simpson Add [[id number] [id number]] Asthma exacerbation       ED Disposition     ED Disposition Condition Date/Time Comment    Discharge Stable Sun Mar 27, 2022  8:21 PM Amrita Loges discharge to home/self care  Follow-up Information     Follow up With Specialties Details Why Contact Info Additional Information    Marco Hernandez DO Family Medicine Schedule an appointment as soon as possible for a visit   [place] [phone]       [address] [place] [address] 34 [facility] Emergency Department Emergency Medicine Go to  If symptoms worsen [address]  [address] [facility] 64 [facility], [place] [address], [facility] 108          Discharge Medication List as of 3/27/2022  8:23 PM      START taking these medications    Details   predniSONE 20 mg tablet Take 2 tablets (40 mg total) by mouth daily for 5 days, Starting Sun 3/27/2022, Until Fri 4/1/2022, Normal         CONTINUE these medications which have NOT CHANGED    Details   albuterol (VENTOLIN HFA) 90 mcg/act inhaler Use 2 puffs every 4-6 hr  PRN for chest tightness or wheezing, Normal      benazepril (LOTENSIN) 20 mg tablet TAKE 1 TABLET BY MOUTH EVERY DAY, Normal      cholecalciferol (VITAMIN D3) 1,000 units tablet Take 2,000 Units by mouth daily, Historical Med      hydrochlorothiazide (HYDRODIURIL) 12 5 mg tablet TAKE 1 TABLET BY MOUTH EVERY DAY, Normal      multivitamin (THERAGRAN) TABS Take 1 tablet by mouth daily, Historical Med           No discharge procedures on file      PDMP Review     None           ED Provider  Attending physically available and ketty Main I managed the patient along with the ED Attending      Electronically Signed by         Tristan Dakin, MD  03/27/22 2825

## 2022-04-04 ENCOUNTER — OFFICE VISIT (OUTPATIENT)
Dept: SLEEP CENTER | Facility: CLINIC | Age: 47
End: 2022-04-04
Payer: COMMERCIAL

## 2022-04-04 VITALS
HEIGHT: 69 IN | DIASTOLIC BLOOD PRESSURE: 82 MMHG | BODY MASS INDEX: 31.4 KG/M2 | WEIGHT: 212 LBS | SYSTOLIC BLOOD PRESSURE: 126 MMHG | HEART RATE: 81 BPM

## 2022-04-04 DIAGNOSIS — J45.20 MILD INTERMITTENT ASTHMA WITHOUT COMPLICATION: ICD-10-CM

## 2022-04-04 DIAGNOSIS — R06.83 SNORING: Primary | ICD-10-CM

## 2022-04-04 DIAGNOSIS — J30.1 SEASONAL ALLERGIC RHINITIS DUE TO POLLEN: ICD-10-CM

## 2022-04-04 DIAGNOSIS — E66.9 OBESITY (BMI 30-39.9): ICD-10-CM

## 2022-04-04 DIAGNOSIS — I10 BENIGN ESSENTIAL HYPERTENSION: ICD-10-CM

## 2022-04-04 PROCEDURE — 1036F TOBACCO NON-USER: CPT | Performed by: INTERNAL MEDICINE

## 2022-04-04 PROCEDURE — 3008F BODY MASS INDEX DOCD: CPT | Performed by: INTERNAL MEDICINE

## 2022-04-04 PROCEDURE — 99204 OFFICE O/P NEW MOD 45 MIN: CPT | Performed by: INTERNAL MEDICINE

## 2022-04-04 RX ORDER — MONTELUKAST SODIUM 10 MG/1
TABLET ORAL
COMMUNITY
Start: 2022-03-31

## 2022-04-04 NOTE — PATIENT INSTRUCTIONS
What is GWENDOLYN? Obstructive sleep apnea is a common and serious sleep disorder that causes you to stop breathing during sleep  The airway repeatedly becomes blocked, limiting the amount of air that reaches your lungs  When this happens, you may snore loudly or making choking noises as you try to breathe  Your brain and body becomes oxygen deprived and you may wake up  This may happen a few times a night, or in more severe cases, several hundred times a night  Sleep apnea can make you wake up in the morning feeling tired or unrefreshed even though you have had a full night of sleep  During the day, you may feel fatigued, have difficulty concentrating or you may even unintentionally fall asleep  This is because your body is waking up numerous times throughout the night, even though you might not be conscious of each awakening  The lack of oxygen your body receives can have negative long-term consequences for your health  This includes:  High blood pressure  Heart disease  Irregular heart rhythms  Stroke  Pre-diabetes and diabetes  Depression    Testing  An objective evaluation of your sleep may be needed before your board certified sleep physician can make a diagnosis  Options include:   In-lab overnight sleep study  This type of sleep study requires you to stay overnight at a sleep center, in a bed that may resemble a hotel room  You will sleep with sensors hooked up to various parts of your body  These sensors record your brain waves, heartbeat, breathing and movement  An overnight sleep study also provides your doctor with the most complete information about your sleep  Learn more about an overnight sleep study      Home sleep apnea test  Some patients with high risk factors for obstructive sleep apnea and no other medical disorders may be candidates for a home sleep apnea test  The testing equipment differs in that it is less complicated than what is used in an overnight sleep study   As such, does not give all the data an in-lab will and if negative, may not mean you do not have the problem  Treatment for sleep apnea  includes using a continuous positive airway pressure (CPAP) machine to keep your airway open during sleep  A mask is placed over your nose and mouth, or just your nose  The mask is hooked to the CPAP machine that blows a gentle stream of air into the mask when you breathe  This helps keep your airway open so you can breathe more regularly  Extra oxygen may be given to you through the machine  You may be given a mouth device  It looks like a mouth guard or dental retainer and stops your tongue and mouth tissues from blocking your throat while you sleep  Surgery may be needed to remove extra tissues that block your mouth, throat, or nose  Manage sleep apnea:   Do not smoke  Nicotine and other chemicals in cigarettes and cigars can cause lung damage  Ask your healthcare provider for information if you currently smoke and need help to quit  E-cigarettes or smokeless tobacco still contain nicotine  Talk to your healthcare provider before you use these products  Do not drink alcohol or take sedative medicine before you go to sleep  Alcohol and sedatives can relax the muscles and tissues around your throat  This can block the airflow to your lungs  Maintain a healthy weight  Excess tissue around your throat may restrict your breathing  Ask your healthcare provider for information if you need to lose weight  Sleep on your side or use pillows designed to prevent sleep apnea  This prevents your tongue or other tissues from blocking your throat  You can also raise the head of your bed  Driving Safety  Refrain from driving when drowsy  Follow up with your healthcare provider as directed:  Write down your questions so you remember to ask them during your visits  Go to [employer] website for more information: Sleepeducation  org     What is GWENDOLYN?    Obstructive sleep apnea is a common and serious sleep disorder that causes you to stop breathing during sleep  The airway repeatedly becomes blocked, limiting the amount of air that reaches your lungs  When this happens, you may snore loudly or making choking noises as you try to breathe  Your brain and body becomes oxygen deprived and you may wake up  This may happen a few times a night, or in more severe cases, several hundred times a night  Sleep apnea can make you wake up in the morning feeling tired or unrefreshed even though you have had a full night of sleep  During the day, you may feel fatigued, have difficulty concentrating or you may even unintentionally fall asleep  This is because your body is waking up numerous times throughout the night, even though you might not be conscious of each awakening  The lack of oxygen your body receives can have negative long-term consequences for your health  This includes:  High blood pressure  Heart disease  Irregular heart rhythms  Stroke  Pre-diabetes and diabetes  Depression    Testing  An objective evaluation of your sleep may be needed before your board certified sleep physician can make a diagnosis  Options include:   In-lab overnight sleep study  This type of sleep study requires you to stay overnight at a sleep center, in a bed that may resemble a hotel room  You will sleep with sensors hooked up to various parts of your body  These sensors record your brain waves, heartbeat, breathing and movement  An overnight sleep study also provides your doctor with the most complete information about your sleep  Learn more about an overnight sleep study      Home sleep apnea test  Some patients with high risk factors for obstructive sleep apnea and no other medical disorders may be candidates for a home sleep apnea test  The testing equipment differs in that it is less complicated than what is used in an overnight sleep study   As such, does not give all the data an in-lab will and if negative, may not mean you do not have the problem  Treatment for sleep apnea  includes using a continuous positive airway pressure (CPAP) machine to keep your airway open during sleep  A mask is placed over your nose and mouth, or just your nose  The mask is hooked to the CPAP machine that blows a gentle stream of air into the mask when you breathe  This helps keep your airway open so you can breathe more regularly  Extra oxygen may be given to you through the machine  You may be given a mouth device  It looks like a mouth guard or dental retainer and stops your tongue and mouth tissues from blocking your throat while you sleep  Surgery may be needed to remove extra tissues that block your mouth, throat, or nose  Manage sleep apnea:   Do not smoke  Nicotine and other chemicals in cigarettes and cigars can cause lung damage  Ask your healthcare provider for information if you currently smoke and need help to quit  E-cigarettes or smokeless tobacco still contain nicotine  Talk to your healthcare provider before you use these products  Do not drink alcohol or take sedative medicine before you go to sleep  Alcohol and sedatives can relax the muscles and tissues around your throat  This can block the airflow to your lungs  Maintain a healthy weight  Excess tissue around your throat may restrict your breathing  Ask your healthcare provider for information if you need to lose weight  Sleep on your side or use pillows designed to prevent sleep apnea  This prevents your tongue or other tissues from blocking your throat  You can also raise the head of your bed  Driving Safety  Refrain from driving when drowsy  Follow up with your healthcare provider as directed:  Write down your questions so you remember to ask them during your visits  Go to AAS website for more information: Sleepeducation  org     Nursing Support:  When: Monday through Friday 7A-5PM except holidays  Where: Our direct line is [phone]      If you are having a true emergency please call 911  In the event that the line is busy or it is after hours please leave a voice message and we will return your call  Please speak clearly, leaving your full name, birth date, best number to reach you and the reason for your call  Medication refills: We will need the name of the medication, the dosage, the ordering provider, whether you get a 30 or 90 day refill, and the pharmacy name and address  Medications will be ordered by the provider only  Nurses cannot call in prescriptions  Please allow 7 days for medication refills  Physician requested updates: If your provider requested that you call with an update after starting medication, please be ready to provide us the medication and dosage, what time you take your medication, the time you attempt to fall asleep, time you fall asleep, when you wake up, and what time you get out of bed  Sleep Study Results: We will contact you with sleep study results and/or next steps after the physician has reviewed your testing

## 2022-04-04 NOTE — PROGRESS NOTES
Review of Systems      Genitourinary none   Cardiology none   Gastrointestinal none   Neurology frequent headaches   Constitutional none   Integumentary none   Psychiatry anxiety   Musculoskeletal none   Pulmonary shortness of breath with activity, wheezing, frequent cough and snoring   ENT throat clearing   Endocrine none   Hematological none

## 2022-04-04 NOTE — PROGRESS NOTES
Consultation - [address]  51 y o  male  :1975  [facility]:[id number]  DOS:2022    Physician Requesting Consult: Augustina Munguia DO             Reason for Consult : At your kind request I saw Caleb Brown for initial sleep evaluation today  The patient is here to evaluate for suspected Obstructive Sleep Apnea  PFSH, Problem List, Medications & Allergies were reviewed in EMR  My Munoz  has a past medical history of 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NAKUL M3) ([facility] [address] ), 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[facility] [address] ), Allergic rhinitis, Asthma, Essential hypertension, benign, Leukemia ([facility] [address] ), Obesity, and Personal history of other diseases of respiratory system  He has a current medication list which includes the following prescription(s): albuterol, benazepril, cholecalciferol, hydrochlorothiazide, multivitamin, and montelukast       HPI:  Family report snoring of several months duration  There is no report of breathing difficulties during sleep  On occasion he has caught himself snoring  He is not aware of modifying factors  Other Complaints: none  Restless Leg Syndrome: reports no suggestive symptoms  Parasomnia: no features reported    Sleep Routine (on average):   Typical Bedtime:  9:00 p m  Gets OOB:  4:00 a m  TIB:7 hrs  Sleep latency:< 15 minutes Sleep Interruptions:infrequent/nite   Awakens: before alarm most days  Upon awakening: usually feels refreshed   My Munoz reports constant fatigue, but no Excessive Daytime Sleepiness , yawns occasionally but is not dozing off   He rated himself at Total score: 14 /24 on the Lyons Sleepiness Scale  Habits:  reports that he has never smoked  He has never used smokeless tobacco  ;   E-Cigarette/Vaping    E-Cigarette Use Never User     ;  reports no history of drug use ;  reports no history of alcohol use  ; Caffeine use:excessive until around 8:00 p m ; Exercise routine: none but is physically active in his job        Family History: Negative for sleep disturbance  ROS - reviewed and as attached  Significant for weight fluctuates in the range of around 5-7 lb  He has nasal symptoms and postnasal drip due to allergies  He has asthma that is usually well controlled but recently needed to go to the ER because of an exacerbation due to seasonal allergies  He reported no cardiac symptoms  He denies acid reflux  EXAM:  /82   Pulse 81   Ht 5' 8 5" (1 74 m)   Wt 96 2 kg (212 lb)   BMI 31 77 kg/m²    General: Well groomed male, well appearing, in no apparent distress  Neurological: Alert and orientated;  cooperative; Cranial nerves intact;    Psychiatric: Speech:clear and coherent;  Normal mood, affect & thought   Skin: warm and dry; Color& Hydration good; no facial rashes or lesions   HEENT:  Craniofacial anatomy: normal Sinuses: non- tender  TMJ: Normal    Eyes: EOM's intact;  conjunctiva/corneas clear   Ears: Externallyappear normal     Nasal Airway: airflow is reduced and assymetric nares Septum:  Deviated; Mucosa: normal; Turbinates: normal; Rhinorrhea: None   Mouth: Lips: normal posture; Dentition: normal   Mucosa:moist  ; Hard Palate:narrow   Oropharryx: crowded and AP narrowing Tongue: Mallampati:Class IV and MobileSoft Palate:  redundant  Tonsils: absent  Neck:; Neck Circumference: 15 5 "; Supple; no abnormal masses; Thyroid:normal  Trachea:central     Lymph: No Cervical or Submandibular Lymhadenopathy  Heart: S1,S2 normal; RRR; no gallop; no murmurs   Lungs: Respiratory Effort:normal  Air entry good bilaterally  No wheezes but expiration is prolonged  No rales  Abdomen: Obese, Soft & non-tender    Extremities: No pedal edema  No clubbing or cyanosis  Musculoskeletal:  Motor normal; Gait:normal        IMPRESSION: Primary/Secondary Sleep Diagnoses (to Medical or Psych conditions) & Comorbidities   1  Snoring  Ambulatory referral to Sleep Medicine    Home Study   2  Mild intermittent asthma without complication  Home Study   3  Seasonal allergic rhinitis due to pollen     4  Benign essential hypertension     5  Obesity (BMI 30-39  9)  Home Study        PLAN:   With respect to above conditions, comprehensive counseling provided on pathophysiology; effects on symptoms and comorbidities, diagnostic strategies & limitations; treatment options; risks or no treament; risks & benefits of the various therapeutic options; costs and insurance aspects  I advised weight reduction, avoiding sleeping supine, using alcohol or sedating medications close to bed time and on safe driving practices  Nocturnal polysomnography is indicated and a diagnostic study will be scheduled  Patient is willing to try Positive airway pressure therapy and will be scheduled for a titration study if indicated  Follow-up to be scheduled after the studies to review results, further details of treatment options and to initiate/adjust therapy  Sincerely,     Authenticated electronically by Yeni Walker MD   on 81/16/04   Board Certified Specialist     Portions of the record may have been created with voice recognition software  Occasional wrong word or "sound a like" substitutions may have occurred due to the inherent limitations of voice recognition software  There may also be notations and random deletions of words or characters from malfunctioning software  Read the chart carefully and recognize, using context, where substitutions/deletions have occurred

## 2022-05-05 ENCOUNTER — TELEPHONE (OUTPATIENT)
Dept: HEMATOLOGY ONCOLOGY | Facility: CLINIC | Age: 47
End: 2022-05-05

## 2022-05-05 NOTE — TELEPHONE ENCOUNTER
Spoke with patient regarding moving his appointment to Monday the 27th of June @3  Patient was fine with the change  I also reminded him to do his bloodwork a week before the appointment

## 2022-05-15 ENCOUNTER — HOSPITAL ENCOUNTER (OUTPATIENT)
Dept: SLEEP CENTER | Facility: CLINIC | Age: 47
Discharge: HOME | End: 2022-05-15
Payer: COMMERCIAL

## 2022-05-15 DIAGNOSIS — E66.9 OBESITY (BMI 30-39.9): ICD-10-CM

## 2022-05-15 DIAGNOSIS — R06.83 SNORING: ICD-10-CM

## 2022-05-15 DIAGNOSIS — J45.20 MILD INTERMITTENT ASTHMA WITHOUT COMPLICATION: ICD-10-CM

## 2022-05-15 PROCEDURE — G0399 HOME SLEEP TEST/TYPE 3 PORTA: HCPCS

## 2022-05-16 DIAGNOSIS — G47.33 OSA (OBSTRUCTIVE SLEEP APNEA): Primary | ICD-10-CM

## 2022-05-16 NOTE — PROGRESS NOTES
Home Sleep Study Documentation    Pre-Sleep Home Study:    Set-up and instructions performed by: MM    Technician performed demonstration for Patient: yes    Return demonstration performed by Patient: yes    Written instructions provided to Patient: yes    Patient signed consent form: yes        Post-Sleep Home Study:    Additional comments by Patient: none    Home Sleep Study Failed:no:    Failure reason: N/A    Reported or Detected: N/A    Scored by: JANINE Jacob, LONGT

## 2022-06-01 ENCOUNTER — TELEPHONE (OUTPATIENT)
Dept: SLEEP CENTER | Facility: CLINIC | Age: 47
End: 2022-06-01

## 2022-06-01 NOTE — TELEPHONE ENCOUNTER
Called patient and advised sleep study resulted and shows mild GWENDOLYN  APAP ordered  Rescheduled DME set up to 6/20/22 in [facility] for CPAP and clinicals sent to [address] [facility] via [place]  Compliance follow up scheduled 9/26/22    Added to wait list

## 2022-06-20 ENCOUNTER — TELEPHONE (OUTPATIENT)
Dept: SLEEP CENTER | Facility: CLINIC | Age: 47
End: 2022-06-20

## 2022-06-20 LAB

## 2022-06-20 NOTE — TELEPHONE ENCOUNTER
Roxanne II, set @ 5-15cm auto, heated humidifier, standard tubing, N30i med cushion nasal mask   Set up @ [facility], per script Dr Dayana Coffman

## 2022-06-22 DIAGNOSIS — I10 BENIGN ESSENTIAL HYPERTENSION: ICD-10-CM

## 2022-06-23 RX ORDER — HYDROCHLOROTHIAZIDE 12.5 MG/1
TABLET ORAL
Qty: 90 TABLET | Refills: 1 | Status: SHIPPED | OUTPATIENT
Start: 2022-06-23

## 2022-06-23 RX ORDER — BENAZEPRIL HYDROCHLORIDE 20 MG/1
TABLET ORAL
Qty: 90 TABLET | Refills: 1 | Status: SHIPPED | OUTPATIENT
Start: 2022-06-23

## 2022-06-24 ENCOUNTER — TELEPHONE (OUTPATIENT)
Dept: HEMATOLOGY ONCOLOGY | Facility: CLINIC | Age: 47
End: 2022-06-24

## 2022-06-24 NOTE — TELEPHONE ENCOUNTER
Appointment Cancellation Or Reschedule     Person calling in Patient    Provider Dr Rahul Mcclain   Office Visit Date and Time 6/27/22 @ 3pm   Office Visit Location [facility]   Did patient want to reschedule their office appointment? If so, when was it scheduled to? Yes 8/4/22 @ 3pm   Is this patient calling to reschedule an infusion appointment? no   When is their next infusion appointment? na   Is this patient a Chemo patient? no   Reason for Cancellation or Reschedule Patient unable to keep appt     If the patient is a treatment patient, please route this to the office nurse  If the patient is not on treatment, please route to the office MA  If the patient is a surgical oncology patient, please route to surg/onc clinical pool

## 2022-07-01 ENCOUNTER — OFFICE VISIT (OUTPATIENT)
Dept: FAMILY MEDICINE CLINIC | Facility: CLINIC | Age: 47
End: 2022-07-01
Payer: COMMERCIAL

## 2022-07-01 VITALS
HEIGHT: 68 IN | SYSTOLIC BLOOD PRESSURE: 110 MMHG | RESPIRATION RATE: 16 BRPM | HEART RATE: 71 BPM | TEMPERATURE: 98.3 F | OXYGEN SATURATION: 96 % | DIASTOLIC BLOOD PRESSURE: 76 MMHG | BODY MASS INDEX: 31.71 KG/M2 | WEIGHT: 209.2 LBS

## 2022-07-01 DIAGNOSIS — Z00.00 ANNUAL PHYSICAL EXAM: Primary | ICD-10-CM

## 2022-07-01 DIAGNOSIS — I10 BENIGN ESSENTIAL HYPERTENSION: ICD-10-CM

## 2022-07-01 DIAGNOSIS — E66.09 CLASS 1 OBESITY DUE TO EXCESS CALORIES WITHOUT SERIOUS COMORBIDITY WITH BODY MASS INDEX (BMI) OF 31.0 TO 31.9 IN ADULT: ICD-10-CM

## 2022-07-01 DIAGNOSIS — Z12.12 SCREENING FOR COLORECTAL CANCER: ICD-10-CM

## 2022-07-01 DIAGNOSIS — R53.83 FATIGUE, UNSPECIFIED TYPE: ICD-10-CM

## 2022-07-01 DIAGNOSIS — Z12.11 SCREENING FOR COLORECTAL CANCER: ICD-10-CM

## 2022-07-01 DIAGNOSIS — Z13.29 SCREENING FOR THYROID DISORDER: ICD-10-CM

## 2022-07-01 DIAGNOSIS — L91.8 CUTANEOUS SKIN TAGS: ICD-10-CM

## 2022-07-01 DIAGNOSIS — L98.9 SKIN LESION: ICD-10-CM

## 2022-07-01 DIAGNOSIS — J45.20 MILD INTERMITTENT ASTHMA WITHOUT COMPLICATION: ICD-10-CM

## 2022-07-01 DIAGNOSIS — Z12.5 SCREENING FOR MALIGNANT NEOPLASM OF PROSTATE: ICD-10-CM

## 2022-07-01 DIAGNOSIS — Z13.6 SCREENING FOR CARDIOVASCULAR CONDITION: ICD-10-CM

## 2022-07-01 PROBLEM — E66.811 CLASS 1 OBESITY DUE TO EXCESS CALORIES WITHOUT SERIOUS COMORBIDITY WITH BODY MASS INDEX (BMI) OF 31.0 TO 31.9 IN ADULT: Status: ACTIVE | Noted: 2018-11-22

## 2022-07-01 PROCEDURE — 99213 OFFICE O/P EST LOW 20 MIN: CPT | Performed by: FAMILY MEDICINE

## 2022-07-01 PROCEDURE — 99396 PREV VISIT EST AGE 40-64: CPT | Performed by: FAMILY MEDICINE

## 2022-07-01 NOTE — ASSESSMENT & PLAN NOTE
Patient has skin lesion on his left medial thigh  Looks benign in nature  Would like to have this removed given irritation      Advised patient schedule appointment in 1 month for removal

## 2022-07-01 NOTE — ASSESSMENT & PLAN NOTE
Patient has cutaneous skin tags on upper neck area      Advised patient follow-up with Dermatology for skin tag removal

## 2022-07-01 NOTE — ASSESSMENT & PLAN NOTE
Blood pressure is well controlled at this time  110/76 on recheck  Ready refill benazepril 20 mg and hydrochlorothiazide 12 5 mg daily  Continue medication  Will order some CMP and TSH for recheck  Advised patient follow-up in 6 months or sooner as needed

## 2022-07-01 NOTE — PROGRESS NOTES
[facility] PRACTICE    NAME: Bobby Grady  AGE: 51 y o  SEX: male  : 1975     DATE: 2022     Assessment and Plan:     Problem List Items Addressed This Visit        Respiratory    Mild intermittent asthma without complication     Advised patient to continue albuterol inhaler in singular as needed  Follow-up with specialist            Relevant Orders    Comprehensive metabolic panel       Cardiovascular and Mediastinum    Benign essential hypertension     Blood pressure is well controlled at this time  110/76 on recheck  Ready refill benazepril 20 mg and hydrochlorothiazide 12 5 mg daily  Continue medication  Will order some CMP and TSH for recheck  Advised patient follow-up in 6 months or sooner as needed  Relevant Orders    Comprehensive metabolic panel    TSH, 3rd generation with Free T4 reflex       Musculoskeletal and Integument    Cutaneous skin tags     Patient has cutaneous skin tags on upper neck area  Advised patient follow-up with Dermatology for skin tag removal            Relevant Orders    Ambulatory Referral to Dermatology    Skin lesion     Patient has skin lesion on his left medial thigh  Looks benign in nature  Would like to have this removed given irritation  Advised patient schedule appointment in 1 month for removal               Other    Class 1 obesity due to excess calories without serious comorbidity with body mass index (BMI) of 31 0 to 31 9 in adult     Advised patient to eat less food and exercise at least 3 hours weekly      Follow-up as needed           Relevant Orders    HEMOGLOBIN A1C W/ EAG ESTIMATION      Other Visit Diagnoses     Annual physical exam    -  Primary    Screening for colorectal cancer        Relevant Orders    Ambulatory referral for colonoscopy    Screening for cardiovascular condition        Relevant Orders    Lipid panel    Screening for thyroid disorder        Relevant Orders    TSH, 3rd generation with Free T4 reflex    Fatigue, unspecified type        Relevant Orders    CBC and differential    Screening for malignant neoplasm of prostate        Relevant Orders    PSA, Total Screen        Patient presents for a well exam today  All past medical history, family history, medications, allergies, social history and problem list updated  All pertinent previous, labs, imaging and records reviewed  Advised patient to see dentist and optometrist at least once a year  Preventative screens negative  Follow up in 1 year for annual well or sooner for any concerns    HM:  Immunizations up-to-date, other than the following: Otherwise UTD with other vaccinations  Colonoscopy: ordered    Will check CBC, CMP, TSH, lipid panel and A1c  Check PSA  Immunizations and preventive care screenings were discussed with patient today  Appropriate education was printed on patient's after visit summary  Counseling:  Alcohol/drug use: discussed moderation in alcohol intake, the recommendations for healthy alcohol use, and avoidance of illicit drug use  Dental Health: discussed importance of regular tooth brushing, flossing, and dental visits  Injury prevention: discussed safety/seat belts, safety helmets, smoke detectors, carbon dioxide detectors, and smoking near bedding or upholstery  Sexual health: discussed sexually transmitted diseases, partner selection, use of condoms, avoidance of unintended pregnancy, and contraceptive alternatives  Exercise: the importance of regular exercise/physical activity was discussed  Recommend exercise 3-5 times per week for at least 30 minutes  BMI Counseling: Body mass index is 31 71 kg/m²  The BMI is above normal  Nutrition recommendations include decreasing portion sizes and moderation in carbohydrate intake  Exercise recommendations include moderate physical activity 150 minutes/week  No pharmacotherapy was ordered   Rationale for BMI follow-up plan is due to patient being overweight or obese  Return in about 6 months (around 1/1/2023) for Recheck  Chief Complaint:     Chief Complaint   Patient presents with    Physical Exam     Annual physical     Follow-up     Medication       History of Present Illness:     Adult Annual Physical   Patient here for a comprehensive physical exam  The patient reports problems - see other note  No smoking   occasional drink 1-2 whiskey  No RD    , 3 kids    Sexual active, no hx of std      Diet and Physical Activity  Diet/Nutrition: well balanced diet  Exercise: walking  Depression Screening  PHQ-2/9 Depression Screening         General Health  Sleep: sleeps well  Hearing: normal - bilateral   Vision: no vision problems  Dental: regular dental visits   Health  Symptoms include: none     Review of Systems:     Review of Systems   Constitutional: Negative for chills and fever  HENT: Negative for congestion, sinus pressure, sinus pain and sore throat  Eyes: Negative for pain and visual disturbance  Respiratory: Negative for cough and shortness of breath  Cardiovascular: Negative for chest pain and palpitations  Gastrointestinal: Negative for abdominal pain, diarrhea, nausea and vomiting  Genitourinary: Negative for dysuria and hematuria  Musculoskeletal: Negative for myalgias  Skin: Negative for rash  Neurological: Negative for dizziness and headaches        Past Medical History:     Past Medical History:   Diagnosis Date    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NAKUL M3) ([facility] [address] )     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HCC)     Allergic rhinitis     Asthma     Essential hypertension, benign     Leukemia ([facility] [address] )     Obesity     Personal history of other diseases of respiratory system       Past Surgical History:     Past Surgical History:   Procedure Laterality Date    BONE MARROW BIOPSY W/ ASPIRATION N/A     PORTACATH PLACEMENT Right     WISDOM TOOTH EXTRACTION Family History:     Family History   Problem Relation Age of Onset    Hypertension Father       Social History:     Social History     Socioeconomic History    Marital status: /Civil Union     Spouse name: None    Number of children: None    Years of education: None    Highest education level: None   Occupational History     Employer: [employer]   Tobacco Use    Smoking status: Never Smoker    Smokeless tobacco: Never Used   Vaping Use    Vaping Use: Never used   Substance and Sexual Activity    Alcohol use: No    Drug use: No    Sexual activity: None   Other Topics Concern    None   Social History Narrative    None     Social Determinants of Health     Financial Resource Strain: Not on file   Food Insecurity: Not on file   Transportation Needs: Not on file   Physical Activity: Not on file   Stress: Not on file   Social Connections: Not on file   Intimate Partner Violence: Not on file   Housing Stability: Not on file      Current Medications:     Current Outpatient Medications   Medication Sig Dispense Refill    albuterol (VENTOLIN HFA) 90 mcg/act inhaler Use 2 puffs every 4-6 hr  PRN for chest tightness or wheezing 1 Inhaler 3    benazepril (LOTENSIN) 20 mg tablet TAKE 1 TABLET BY MOUTH EVERY DAY 90 tablet 1    cholecalciferol (VITAMIN D3) 1,000 units tablet Take 2,000 Units by mouth daily      hydrochlorothiazide (HYDRODIURIL) 12 5 mg tablet TAKE 1 TABLET BY MOUTH EVERY DAY 90 tablet 1    montelukast (SINGULAIR) 10 mg tablet       multivitamin (THERAGRAN) TABS Take 1 tablet by mouth daily       No current facility-administered medications for this visit  Allergies:      Allergies   Allergen Reactions    Penicillins Hives      Physical Exam:     /76 (BP Location: Left arm, Patient Position: Sitting)   Pulse 71   Temp 98 3 °F (36 8 °C) (Tympanic)   Resp 16   Ht 5' 8 11" (1 73 m)   Wt 94 9 kg (209 lb 3 2 oz)   SpO2 96%   BMI 31 71 kg/m²     Physical Exam  Vitals and nursing note reviewed  Constitutional:       Appearance: He is well-developed  HENT:      Head: Normocephalic and atraumatic  Right Ear: Tympanic membrane normal       Left Ear: Tympanic membrane normal       Nose: Nose normal       Mouth/Throat:      Mouth: Mucous membranes are moist       Pharynx: Oropharynx is clear  Eyes:      Conjunctiva/sclera: Conjunctivae normal    Cardiovascular:      Rate and Rhythm: Normal rate and regular rhythm  Pulses: Normal pulses  Heart sounds: No murmur heard  Pulmonary:      Effort: Pulmonary effort is normal  No respiratory distress  Breath sounds: Normal breath sounds  Abdominal:      Palpations: Abdomen is soft  Tenderness: There is no abdominal tenderness  Musculoskeletal:         General: Normal range of motion  Cervical back: Neck supple  Lymphadenopathy:      Cervical: No cervical adenopathy  Skin:     General: Skin is warm and dry  Capillary Refill: Capillary refill takes less than 2 seconds  Neurological:      Mental Status: He is alert  Psychiatric:         Mood and Affect: Mood normal         This note has been constructed using a voice recognition system  There may be translation, syntax, or grammatical errors  If you have an questions, please contact the dictating provider        Marco Agosto, [address] [facility],[address]

## 2022-07-01 NOTE — PROGRESS NOTES
Assessment/Plan:    Mild intermittent asthma without complication  Advised patient to continue albuterol inhaler in singular as needed  Follow-up with specialist     Benign essential hypertension  Blood pressure is well controlled at this time  110/76 on recheck  Ready refill benazepril 20 mg and hydrochlorothiazide 12 5 mg daily  Continue medication  Will order some CMP and TSH for recheck  Advised patient follow-up in 6 months or sooner as needed  Cutaneous skin tags  Patient has cutaneous skin tags on upper neck area  Advised patient follow-up with Dermatology for skin tag removal     Skin lesion  Patient has skin lesion on his left medial thigh  Looks benign in nature  Would like to have this removed given irritation  Advised patient schedule appointment in 1 month for removal     Class 1 obesity due to excess calories without serious comorbidity with body mass index (BMI) of 31 0 to 31 9 in adult  Advised patient to eat less food and exercise at least 3 hours weekly  Follow-up as needed       Diagnoses and all orders for this visit:    Annual physical exam    Benign essential hypertension  -     Comprehensive metabolic panel; Future  -     TSH, 3rd generation with Free T4 reflex; Future    Mild intermittent asthma without complication  -     Comprehensive metabolic panel; Future    Cutaneous skin tags  -     Ambulatory Referral to Dermatology; Future    Skin lesion    Class 1 obesity due to excess calories without serious comorbidity with body mass index (BMI) of 31 0 to 31 9 in adult  -     HEMOGLOBIN A1C W/ EAG ESTIMATION; Future    Screening for colorectal cancer  -     Ambulatory referral for colonoscopy; Future    Screening for cardiovascular condition  -     Lipid panel; Future    Screening for thyroid disorder  -     TSH, 3rd generation with Free T4 reflex;  Future    Fatigue, unspecified type  -     CBC and differential; Future    Screening for malignant neoplasm of prostate  - PSA, Total Screen; Future        Subjective:   Chief Complaint   Patient presents with    Physical Exam     Annual physical     Follow-up     Medication      Health Maintenance   Topic Date Due    Hepatitis C Screening  Never done    HIV Screening  Never done    Colorectal Cancer Screening  Never done    COVID-19 Vaccine (4 - Booster for Pfizer series) 01/21/2022    Depression Screening  01/28/2022    Pneumococcal Vaccine: Pediatrics (0 to 5 Years) and At-Risk Patients (6 to 59 Years) (3 - PCV) 01/28/2022    Influenza Vaccine (1) 09/01/2022    BMI: Followup Plan  07/01/2023    BMI: Adult  07/01/2023    Annual Physical  07/01/2023    DTaP,Tdap,and Td Vaccines (2 - Td or Tdap) 07/04/2030    HIB Vaccine  Aged Out    Hepatitis B Vaccine  Aged Out    IPV Vaccine  Aged Out    Hepatitis A Vaccine  Aged Out    Meningococcal ACWY Vaccine  Aged Out    HPV Vaccine  Aged Out        Patient ID: Katt Morocho is a 51 y o  male  HPI      Patient presents for medication management  HTN:  On benazepril 20 milligrams daily and hydrochlorothiazide 12 5 milligrams daily  Mild intermittent asthma:  On albuterol inhaler as needed  Also on singular 10 milligrams daily  Gets medication refilled with allergist     Patient has multiple skin tags on neck  Would like referral to Dermatology for skin tag removal       Patient also reports having a skin lesion on the left medial thigh that is irritating  Has been applying hydrocortisone cream without any significant improvement  The following portions of the patient's history were reviewed and updated as appropriate: allergies, current medications, past family history, past medical history, past social history, past surgical history, and problem list     Review of Systems   Constitutional: Negative for chills and fever  HENT: Negative for congestion  Respiratory: Negative for cough and shortness of breath      Cardiovascular: Negative for chest pain and palpitations  Gastrointestinal: Negative for vomiting  Genitourinary: Negative for dysuria  Musculoskeletal: Negative for myalgias  Skin: Positive for rash  Neurological: Negative for dizziness and headaches  Objective:  /76 (BP Location: Left arm, Patient Position: Sitting)   Pulse 71   Temp 98 3 °F (36 8 °C) (Tympanic)   Resp 16   Ht 5' 8 11" (1 73 m)   Wt 94 9 kg (209 lb 3 2 oz)   SpO2 96%   BMI 31 71 kg/m²      Physical Exam  Vitals and nursing note reviewed  Constitutional:       General: He is not in acute distress  Appearance: He is obese  HENT:      Head: Normocephalic and atraumatic  Eyes:      Conjunctiva/sclera: Conjunctivae normal    Cardiovascular:      Rate and Rhythm: Normal rate and regular rhythm  Pulses: Normal pulses  Heart sounds: No murmur heard  Pulmonary:      Effort: Pulmonary effort is normal  No respiratory distress  Breath sounds: No wheezing, rhonchi or rales  Abdominal:      Palpations: Abdomen is soft  Tenderness: There is no abdominal tenderness  Musculoskeletal:         General: No swelling  Lymphadenopathy:      Cervical: No cervical adenopathy  Skin:     Comments: Papule noted on left inner thigh  Roughly 0 5 cm in diameter  Nonpruritic non erythematous and nontender  Well-circumscribed    Skin tags noted on left side and midline of neck  Neurological:      Mental Status: He is alert  This note has been constructed using a voice recognition system  There may be translation, syntax, or grammatical errors  If you have an questions, please contact the dictating provider

## 2022-07-01 NOTE — PATIENT INSTRUCTIONS

## 2022-08-02 ENCOUNTER — RA CDI HCC (OUTPATIENT)
Dept: OTHER | Facility: HOSPITAL | Age: 47
End: 2022-08-02

## 2022-08-06 ENCOUNTER — APPOINTMENT (OUTPATIENT)
Dept: LAB | Facility: HOSPITAL | Age: 47
End: 2022-08-06
Attending: FAMILY MEDICINE
Payer: COMMERCIAL

## 2022-08-06 DIAGNOSIS — Z13.6 SCREENING FOR CARDIOVASCULAR CONDITION: ICD-10-CM

## 2022-08-06 DIAGNOSIS — E66.09 CLASS 1 OBESITY DUE TO EXCESS CALORIES WITHOUT SERIOUS COMORBIDITY WITH BODY MASS INDEX (BMI) OF 31.0 TO 31.9 IN ADULT: ICD-10-CM

## 2022-08-06 DIAGNOSIS — J45.20 MILD INTERMITTENT ASTHMA WITHOUT COMPLICATION: ICD-10-CM

## 2022-08-06 DIAGNOSIS — Z13.29 SCREENING FOR THYROID DISORDER: ICD-10-CM

## 2022-08-06 DIAGNOSIS — I10 BENIGN ESSENTIAL HYPERTENSION: ICD-10-CM

## 2022-08-06 DIAGNOSIS — Z12.5 SCREENING FOR MALIGNANT NEOPLASM OF PROSTATE: ICD-10-CM

## 2022-08-06 DIAGNOSIS — R53.83 FATIGUE, UNSPECIFIED TYPE: ICD-10-CM

## 2022-08-06 LAB
ALBUMIN SERPL BCP-MCNC: 3.2 G/DL (ref 3.5–5)
ALP SERPL-CCNC: 71 U/L (ref 46–116)
ALT SERPL W P-5'-P-CCNC: 38 U/L (ref 12–78)
ANION GAP SERPL CALCULATED.3IONS-SCNC: 2 MMOL/L (ref 4–13)
AST SERPL W P-5'-P-CCNC: 25 U/L (ref 5–45)
BASOPHILS # BLD AUTO: 0.03 THOUSANDS/ΜL (ref 0–0.1)
BASOPHILS NFR BLD AUTO: 1 % (ref 0–1)
BILIRUB SERPL-MCNC: 0.57 MG/DL (ref 0.2–1)
BUN SERPL-MCNC: 15 MG/DL (ref 5–25)
CALCIUM ALBUM COR SERPL-MCNC: 8.8 MG/DL (ref 8.3–10.1)
CALCIUM SERPL-MCNC: 8.2 MG/DL (ref 8.3–10.1)
CHLORIDE SERPL-SCNC: 110 MMOL/L (ref 96–108)
CHOLEST SERPL-MCNC: 114 MG/DL
CO2 SERPL-SCNC: 30 MMOL/L (ref 21–32)
CREAT SERPL-MCNC: 0.92 MG/DL (ref 0.6–1.3)
EOSINOPHIL # BLD AUTO: 0.33 THOUSAND/ΜL (ref 0–0.61)
EOSINOPHIL NFR BLD AUTO: 7 % (ref 0–6)
ERYTHROCYTE [DISTWIDTH] IN BLOOD BY AUTOMATED COUNT: 11.8 % (ref 11.6–15.1)
EST. AVERAGE GLUCOSE BLD GHB EST-MCNC: 108 MG/DL
GFR SERPL CREATININE-BSD FRML MDRD: 98 ML/MIN/1.73SQ M
GLUCOSE P FAST SERPL-MCNC: 100 MG/DL (ref 65–99)
HBA1C MFR BLD: 5.4 %
HCT VFR BLD AUTO: 44.8 % (ref 36.5–49.3)
HDLC SERPL-MCNC: 29 MG/DL
HGB BLD-MCNC: 14.7 G/DL (ref 12–17)
IMM GRANULOCYTES # BLD AUTO: 0.01 THOUSAND/UL (ref 0–0.2)
IMM GRANULOCYTES NFR BLD AUTO: 0 % (ref 0–2)
LDLC SERPL CALC-MCNC: 56 MG/DL (ref 0–100)
LYMPHOCYTES # BLD AUTO: 1.13 THOUSANDS/ΜL (ref 0.6–4.47)
LYMPHOCYTES NFR BLD AUTO: 22 % (ref 14–44)
MCH RBC QN AUTO: 31.1 PG (ref 26.8–34.3)
MCHC RBC AUTO-ENTMCNC: 32.8 G/DL (ref 31.4–37.4)
MCV RBC AUTO: 95 FL (ref 82–98)
MONOCYTES # BLD AUTO: 0.44 THOUSAND/ΜL (ref 0.17–1.22)
MONOCYTES NFR BLD AUTO: 9 % (ref 4–12)
NEUTROPHILS # BLD AUTO: 3.11 THOUSANDS/ΜL (ref 1.85–7.62)
NEUTS SEG NFR BLD AUTO: 61 % (ref 43–75)
NONHDLC SERPL-MCNC: 85 MG/DL
NRBC BLD AUTO-RTO: 0 /100 WBCS
PLATELET # BLD AUTO: 204 THOUSANDS/UL (ref 149–390)
PMV BLD AUTO: 11.3 FL (ref 8.9–12.7)
POTASSIUM SERPL-SCNC: 4.1 MMOL/L (ref 3.5–5.3)
PROT SERPL-MCNC: 6.6 G/DL (ref 6.4–8.4)
PSA SERPL-MCNC: 0.2 NG/ML (ref 0–4)
RBC # BLD AUTO: 4.73 MILLION/UL (ref 3.88–5.62)
SODIUM SERPL-SCNC: 142 MMOL/L (ref 135–147)
TRIGL SERPL-MCNC: 144 MG/DL
TSH SERPL DL<=0.05 MIU/L-ACNC: 2.39 UIU/ML (ref 0.45–4.5)
WBC # BLD AUTO: 5.05 THOUSAND/UL (ref 4.31–10.16)

## 2022-08-06 PROCEDURE — 83036 HEMOGLOBIN GLYCOSYLATED A1C: CPT

## 2022-08-06 PROCEDURE — 80061 LIPID PANEL: CPT

## 2022-08-06 PROCEDURE — 85025 COMPLETE CBC W/AUTO DIFF WBC: CPT

## 2022-08-06 PROCEDURE — 36415 COLL VENOUS BLD VENIPUNCTURE: CPT

## 2022-08-06 PROCEDURE — G0103 PSA SCREENING: HCPCS

## 2022-08-06 PROCEDURE — 80053 COMPREHEN METABOLIC PANEL: CPT

## 2022-08-06 PROCEDURE — 84443 ASSAY THYROID STIM HORMONE: CPT

## 2022-08-09 ENCOUNTER — PROCEDURE VISIT (OUTPATIENT)
Dept: FAMILY MEDICINE CLINIC | Facility: CLINIC | Age: 47
End: 2022-08-09

## 2022-08-09 VITALS
HEART RATE: 102 BPM | DIASTOLIC BLOOD PRESSURE: 86 MMHG | RESPIRATION RATE: 16 BRPM | WEIGHT: 209 LBS | HEIGHT: 69 IN | TEMPERATURE: 99.7 F | BODY MASS INDEX: 30.96 KG/M2 | SYSTOLIC BLOOD PRESSURE: 122 MMHG

## 2022-08-09 DIAGNOSIS — L98.9 SKIN LESION: Primary | ICD-10-CM

## 2022-08-09 PROCEDURE — 88312 SPECIAL STAINS GROUP 1: CPT | Performed by: PATHOLOGY

## 2022-08-09 PROCEDURE — 88305 TISSUE EXAM BY PATHOLOGIST: CPT | Performed by: PATHOLOGY

## 2022-08-09 NOTE — PROGRESS NOTES
Biopsy    Date/Time: 8/9/2022 3:04 PM  Performed by: Micha Keyes DO  Authorized by: Micha Keyes DO   Universal Protocol:  Consent: Verbal consent obtained  Written consent obtained  Consent given by: patient  Patient understanding: patient states understanding of the procedure being performed      Procedure Details - Skin Biopsy:     Biopsy tissue type: skin    Biopsy method: shave biopsy      Initial size (mm):  8    Final defect size (mm):  8    Malignancy: malignancy unknown       Left medial thigh prepped and cleaned with alcohol x 2  Then numbing spray apply to the skin lesion  0 5 cc of 1% lidocaine with epi injected into site of concern for anesthesia  Once anesthesia was achieved, attempted shave bx with flex blade  Unsuccessful  Transition to #15 scalpel blade  Hemostasis achieved with silver nitrate sticks x 4  Triple abx ointment and bandage applied

## 2022-08-09 NOTE — PATIENT INSTRUCTIONS
Skin Biopsy   AMBULATORY CARE:   A skin biopsy  is a procedure used to remove a small piece of skin for testing  The type of biopsy you need will depend on the condition your healthcare provider wants to test for  Common conditions include cancer or precancer  A precancer growth is not cancer yet, but it could become cancer later  A skin condition such as eczema, rash, or a skin infection may also be reasons for a biopsy  You may need to have treatment depending on the results of the skin biopsy tests  What will happen during your skin biopsy: Your healthcare provider will clean the skin where he or she will do the biopsy  He or she will use a local anesthetic medicine to make you more comfortable during your procedure  This medicine is a shot put into the skin that will numb the area, and dull your pain  You may still feel pressure or pushing during the procedure after you get this medicine  The procedure will depend on the type of biopsy you have:     A punch biopsy is used to take the whole thickness of a small, round piece of skin  The punch tool will be placed on the area where the skin sample will be taken  Your healthcare provider will move and press the punch downward to cut the skin  Once the skin is loosened, your healthcare provider will pull it up, and cut it out  Stitches are used to close the wound  A shave biopsy is used to scrape off a top layer of skin  Your healthcare provider will first inject medicine into your skin  This will cause the area to be raised  Your provider will use a blade or other tool to scrape or shave off the raised area of skin  An excisional biopsy is used if you have a growth or sore that needs to be tested for cancer  Your healthcare provider will cut the growth off the skin  Layers of skin and fat may be taken  The area will be closed with stitches  An incisional biopsy is also used to test for cancer, but only part of a growth or sore is removed   Your healthcare provider will cut part of the growth out  The area may need to be closed with stitches  Your healthcare provider may put medicine on your wound to stop the area from bleeding  The skin sample will be sent to a lab for tests  What will happen after your skin biopsy:  A bandage will cover the biopsy area to keep it clean and dry  The bandage will help to protect the area from infection  When the procedure is over, you may be able to go home  You may have some bleeding, oozing, redness, or swelling after the biopsy  These are normal  You may also have pain during the first 24 to 48 hours after your procedure  Risks of a skin biopsy:  A skin biopsy may cause you to bleed from the biopsy area, or get an infection  You may have bruising, swelling, or pain in the area where the biopsy was done  You may have scarring from where the skin tissue was removed  You are at higher risk of having problems healing after your procedure if you smoke, or take steroid medicines  You may have an allergic response from the numbing medicine used for the procedure  Seek care immediately if:   You have red lines on your skin coming from your wound area  Blood soaks through your bandage  Contact your healthcare provider if:   You have a fever  You have increased swelling, redness, or bleeding from your wound  You have pain that does not go away, or is not helped by pain medicines  You have pus in the wound, or yellow or green drainage coming out of your wound  You have questions or concerns about your condition or care  Medicines: You may need any of the following:  NSAIDs , such as ibuprofen, help decrease swelling, pain, and fever  This medicine is available with or without a doctor's order  NSAIDs can cause stomach bleeding or kidney problems in certain people  If you take blood thinner medicine, always ask your healthcare provider if NSAIDs are safe for you   Always read the medicine label and follow directions  Acetaminophen  decreases pain and fever  It is available without a doctor's order  Ask how much to take and how often to take it  Follow directions  Read the labels of all other medicines you are using to see if they also contain acetaminophen, or ask your doctor or pharmacist  Acetaminophen can cause liver damage if not taken correctly  Do not use more than 4 grams (4,000 milligrams) total of acetaminophen in one day  Prescription pain medicine  may be given  Ask your healthcare provider how to take this medicine safely  Some prescription pain medicines contain acetaminophen  Do not take other medicines that contain acetaminophen without talking to your healthcare provider  Too much acetaminophen may cause liver damage  Prescription pain medicine may cause constipation  Ask your healthcare provider how to prevent or treat constipation  Take your medicine as directed  Contact your healthcare provider if you think your medicine is not helping or if you have side effects  Tell him or her if you are allergic to any medicine  Keep a list of the medicines, vitamins, and herbs you take  Include the amounts, and when and why you take them  Bring the list or the pill bottles to follow-up visits  Carry your medicine list with you in case of an emergency  Wound care:  Check the wound for signs of infection, such as redness, swelling, or pus  Carefully wash the wound with soap and water  Dry the area and put on new, clean bandages as directed  Change your bandages when they get wet or dirty  Follow up with your healthcare provider or dermatologist as directed: You may need to return to have your stitches removed  The results of the biopsy are usually ready within 10 days of the procedure  Write down your questions so you remember to ask them during your visits    © Copyright [employer] 2022 Information is for End User's use only and may not be sold, redistributed or otherwise used for commercial purposes  All illustrations and images included in CareNotes® are the copyrighted property of [employer]  or Elvia Aquino  The above information is an  only  It is not intended as medical advice for individual conditions or treatments  Talk to your doctor, nurse or pharmacist before following any medical regimen to see if it is safe and effective for you

## 2022-08-15 ENCOUNTER — TELEPHONE (OUTPATIENT)
Dept: HEMATOLOGY ONCOLOGY | Facility: CLINIC | Age: 47
End: 2022-08-15

## 2022-08-23 ENCOUNTER — TELEPHONE (OUTPATIENT)
Dept: HEMATOLOGY ONCOLOGY | Facility: CLINIC | Age: 47
End: 2022-08-23

## 2022-08-23 DIAGNOSIS — C92.41 ACUTE PROMYELOCYTIC LEUKEMIA IN REMISSION (HCC): Primary | ICD-10-CM

## 2022-08-23 NOTE — TELEPHONE ENCOUNTER
Patient is rescheduled for October but wanted to know if any testing or labs need to be done.  Please let patient know

## 2022-08-23 NOTE — TELEPHONE ENCOUNTER
"Attempted to c/b pt without success.  Left VM letting him know that he will need to have his labs drawn prior to his f/u visit with Dr. Jj in October.      Left \"[employer]\" phone number as c/b.   "

## 2022-10-12 PROBLEM — Z00.00 WELL ADULT EXAM: Status: RESOLVED | Noted: 2021-01-28 | Resolved: 2022-10-12

## 2022-10-19 ENCOUNTER — APPOINTMENT (OUTPATIENT)
Dept: LAB | Facility: CLINIC | Age: 47
End: 2022-10-19
Payer: COMMERCIAL

## 2022-10-19 DIAGNOSIS — C92.41 ACUTE PROMYELOCYTIC LEUKEMIA IN REMISSION (HCC): ICD-10-CM

## 2022-10-19 LAB
ALBUMIN SERPL BCP-MCNC: 4.2 G/DL (ref 3.5–5)
ALP SERPL-CCNC: 72 U/L (ref 46–116)
ALT SERPL W P-5'-P-CCNC: 47 U/L (ref 12–78)
ANION GAP SERPL CALCULATED.3IONS-SCNC: 4 MMOL/L (ref 4–13)
AST SERPL W P-5'-P-CCNC: 22 U/L (ref 5–45)
BASOPHILS # BLD AUTO: 0.05 THOUSANDS/ÂΜL (ref 0–0.1)
BASOPHILS NFR BLD AUTO: 1 % (ref 0–1)
BILIRUB SERPL-MCNC: 0.86 MG/DL (ref 0.2–1)
BUN SERPL-MCNC: 27 MG/DL (ref 5–25)
CALCIUM SERPL-MCNC: 9 MG/DL (ref 8.3–10.1)
CHLORIDE SERPL-SCNC: 107 MMOL/L (ref 96–108)
CO2 SERPL-SCNC: 25 MMOL/L (ref 21–32)
CREAT SERPL-MCNC: 0.89 MG/DL (ref 0.6–1.3)
EOSINOPHIL # BLD AUTO: 0.36 THOUSAND/ÂΜL (ref 0–0.61)
EOSINOPHIL NFR BLD AUTO: 4 % (ref 0–6)
ERYTHROCYTE [DISTWIDTH] IN BLOOD BY AUTOMATED COUNT: 12 % (ref 11.6–15.1)
GFR SERPL CREATININE-BSD FRML MDRD: 101 ML/MIN/1.73SQ M
GLUCOSE SERPL-MCNC: 103 MG/DL (ref 65–140)
HCT VFR BLD AUTO: 45.5 % (ref 36.5–49.3)
HGB BLD-MCNC: 15.7 G/DL (ref 12–17)
IMM GRANULOCYTES # BLD AUTO: 0.03 THOUSAND/UL (ref 0–0.2)
IMM GRANULOCYTES NFR BLD AUTO: 0 % (ref 0–2)
LYMPHOCYTES # BLD AUTO: 1.96 THOUSANDS/ÂΜL (ref 0.6–4.47)
LYMPHOCYTES NFR BLD AUTO: 22 % (ref 14–44)
MCH RBC QN AUTO: 31.7 PG (ref 26.8–34.3)
MCHC RBC AUTO-ENTMCNC: 34.5 G/DL (ref 31.4–37.4)
MCV RBC AUTO: 92 FL (ref 82–98)
MONOCYTES # BLD AUTO: 0.63 THOUSAND/ÂΜL (ref 0.17–1.22)
MONOCYTES NFR BLD AUTO: 7 % (ref 4–12)
NEUTROPHILS # BLD AUTO: 5.82 THOUSANDS/ÂΜL (ref 1.85–7.62)
NEUTS SEG NFR BLD AUTO: 66 % (ref 43–75)
NRBC BLD AUTO-RTO: 0 /100 WBCS
PLATELET # BLD AUTO: 254 THOUSANDS/UL (ref 149–390)
PMV BLD AUTO: 10.9 FL (ref 8.9–12.7)
POTASSIUM SERPL-SCNC: 3.8 MMOL/L (ref 3.5–5.3)
PROT SERPL-MCNC: 7.7 G/DL (ref 6.4–8.4)
RBC # BLD AUTO: 4.95 MILLION/UL (ref 3.88–5.62)
SODIUM SERPL-SCNC: 136 MMOL/L (ref 135–147)
WBC # BLD AUTO: 8.85 THOUSAND/UL (ref 4.31–10.16)

## 2022-10-19 PROCEDURE — 85025 COMPLETE CBC W/AUTO DIFF WBC: CPT

## 2022-10-19 PROCEDURE — 88374 M/PHMTRC ALYS ISHQUANT/SEMIQ: CPT

## 2022-10-19 PROCEDURE — 36415 COLL VENOUS BLD VENIPUNCTURE: CPT

## 2022-10-19 PROCEDURE — 80053 COMPREHEN METABOLIC PANEL: CPT

## 2022-10-19 PROCEDURE — 81315 PML/RARALPHA COM BREAKPOINTS: CPT

## 2022-10-20 ENCOUNTER — OFFICE VISIT (OUTPATIENT)
Dept: HEMATOLOGY ONCOLOGY | Facility: CLINIC | Age: 47
End: 2022-10-20
Payer: COMMERCIAL

## 2022-10-20 VITALS
OXYGEN SATURATION: 98 % | SYSTOLIC BLOOD PRESSURE: 136 MMHG | DIASTOLIC BLOOD PRESSURE: 82 MMHG | TEMPERATURE: 98.9 F | WEIGHT: 215 LBS | RESPIRATION RATE: 18 BRPM | HEIGHT: 69 IN | BODY MASS INDEX: 31.84 KG/M2 | HEART RATE: 80 BPM

## 2022-10-20 DIAGNOSIS — R53.83 OTHER FATIGUE: ICD-10-CM

## 2022-10-20 DIAGNOSIS — G47.33 OSA (OBSTRUCTIVE SLEEP APNEA): ICD-10-CM

## 2022-10-20 DIAGNOSIS — C92.41 ACUTE PROMYELOCYTIC LEUKEMIA IN REMISSION (HCC): Primary | ICD-10-CM

## 2022-10-20 PROBLEM — R53.82 CHRONIC FATIGUE: Status: ACTIVE | Noted: 2022-10-20

## 2022-10-20 PROCEDURE — 99214 OFFICE O/P EST MOD 30 MIN: CPT | Performed by: INTERNAL MEDICINE

## 2022-10-20 NOTE — PROGRESS NOTES
[facility]   Deepti Morgan [id number]  [address],1975, [phone]  10/20/22    Discussion:   In summary, this is a 67-year-old male with a history of APL as outlined  Generally he is doing well  He is able to work without restriction  He has been diagnosed with sleep apnea and started using CPAP several months ago  Despite this he notes that his energy level is diminished  Medications are not effective  Has asthma which is intermittently active  No suggestion of cardiac symptoms  He denies symptoms suggestive of depression  I wonder about the possibility of decreased testosterone  A m  Testing is recommended  I discussed the above with the patient  The patient  voiced understanding and agreement   ______________________________________________________________________    Chief Complaint   Patient presents with   • Follow-up       HPI:  Oncology History   Acute promyelocytic leukemia in remission ([facility] [address] )   6/9/2015 Initial Diagnosis    Acute promyelocytic leukemia in remission [facility])  Patient presented to the hospital with bruising going on for a week or more  White blood cell count 51 9, hemoglobin 11 7, platelet count 17, 59% blasts  Fibrinogen 73, INR 1 96, PTT 31       6/10/2015 Biopsy    Flow cytometry was consistent with a diagnosis of acute promyelocytic leukemia  6/16/2015 -  Chemotherapy    Patient was started on ATRA 45 mg per meter squared in divided doses  Idarubicin was started 6/16/15 and administered 12 mg per meter squared days +2, +4, +6, pulse 8  On day #9 arsenic trioxide 0 15 mg per kilogram was initiated and continued daily  Treatment was complicated by stomatitis, pulmonary infiltrate (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 syndrome)  He was treated with a variety of antibiotics including acyclovir Mycafungin, cefuroxime  He was treated with allopurinol and potassium       8/24/2015 - 2/2016 Chemotherapy    ATRA 2 weeks on, 2 weeks off,   Trisenox 4 weeks on, 4 weeks off       3/10/2016 Remission           Interval History:  Clinically stable  Fatigue  ECOG-  1 - Symptomatic but completely ambulatory    Review of Systems   Constitutional: Negative for chills and fever  HENT: Negative for nosebleeds  Eyes: Negative for discharge  Respiratory: Negative for cough and shortness of breath  Cardiovascular: Negative for chest pain  Gastrointestinal: Negative for abdominal pain, constipation and diarrhea  Endocrine: Negative for polydipsia  Genitourinary: Negative for hematuria  Musculoskeletal: Negative for arthralgias  Skin: Negative for color change  Allergic/Immunologic: Negative for immunocompromised state  Neurological: Negative for dizziness and headaches  Hematological: Negative for adenopathy  Psychiatric/Behavioral: Negative for agitation         Past Medical History:   Diagnosis Date   • 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NAKUL M3) ([place] [address] )    • 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[place] [address] )    • Allergic rhinitis    • Asthma    • Essential hypertension, benign    • Leukemia ([place] [address] )    • Obesity    • Personal history of other diseases of respiratory system      Patient Active Problem List   Diagnosis   • Acute promyelocytic leukemia in remission ([place] [address] )   • Allergic rhinitis   • Benign essential hypertension   • Seasonal allergies   • Persistent dry cough   • Other viral warts   • Facial skin lesion   • Bronchospasm   • Class 1 obesity due to excess calories without serious comorbidity with body mass index (BMI) of 31 0 to 31 9 in adult   • Mild intermittent asthma without complication   • Dyslipidemia   • COVID-19 virus infection   • Snoring   • GWENDOLYN (obstructive sleep apnea)   • Cutaneous skin tags   • Skin lesion       Current Outpatient Medications:   •  albuterol (VENTOLIN HFA) 90 mcg/act inhaler, Use 2 puffs every 4-6 hr  PRN for chest tightness or wheezing, Disp: 1 Inhaler, Rfl: 3  •  benazepril (LOTENSIN) 20 mg tablet, TAKE 1 TABLET BY MOUTH EVERY DAY, Disp: 90 tablet, Rfl: 1  •  hydrochlorothiazide (HYDRODIURIL) 12 5 mg tablet, TAKE 1 TABLET BY MOUTH EVERY DAY, Disp: 90 tablet, Rfl: 1  •  multivitamin (THERAGRAN) TABS, Take 1 tablet by mouth daily, Disp: , Rfl:   •  cholecalciferol (VITAMIN D3) 1,000 units tablet, Take 2,000 Units by mouth daily (Patient not taking: Reported on 10/20/2022), Disp: , Rfl:   •  montelukast (SINGULAIR) 10 mg tablet, , Disp: , Rfl:   Allergies   Allergen Reactions   • Penicillins Hives     Past Surgical History:   Procedure Laterality Date   • BONE MARROW BIOPSY W/ ASPIRATION N/A    • PORTACATH PLACEMENT Right    • WISDOM TOOTH EXTRACTION       Social History     Objective:  Vitals:    10/20/22 1508   BP: 136/82   BP Location: Left arm   Patient Position: Sitting   Cuff Size: Adult   Pulse: 80   Resp: 18   Temp: 98 9 °F (37 2 °C)   TempSrc: Temporal   SpO2: 98%   Weight: 97 5 kg (215 lb)   Height: 5' 8 5" (1 74 m)     Physical Exam  Constitutional:       Appearance: He is well-developed  HENT:      Head: Normocephalic and atraumatic  Eyes:      Pupils: Pupils are equal, round, and reactive to light  Cardiovascular:      Rate and Rhythm: Normal rate and regular rhythm  Heart sounds: No murmur heard  Pulmonary:      Breath sounds: Normal breath sounds  No wheezing or rales  Abdominal:      Palpations: Abdomen is soft  Tenderness: There is no abdominal tenderness  Musculoskeletal:         General: No tenderness  Normal range of motion  Cervical back: Neck supple  Lymphadenopathy:      Cervical: No cervical adenopathy  Skin:     Findings: No erythema or rash  Neurological:      Mental Status: He is alert and oriented to person, place, and time  Cranial Nerves: No cranial nerve deficit  Deep Tendon Reflexes: Reflexes are normal and symmetric  Psychiatric:         Behavior: Behavior normal            Labs:   I personally reviewed the labs and imaging pertinent to this patient care

## 2022-10-26 LAB — MISCELLANEOUS LAB TEST RESULT: NORMAL

## 2022-11-02 ENCOUNTER — APPOINTMENT (OUTPATIENT)
Dept: LAB | Facility: CLINIC | Age: 47
End: 2022-11-02

## 2022-11-02 DIAGNOSIS — R53.83 OTHER FATIGUE: ICD-10-CM

## 2022-11-02 LAB — TESTOST SERPL-MCNC: 255 NG/DL (ref 113–1065)

## 2022-12-13 ENCOUNTER — VBI (OUTPATIENT)
Dept: ADMINISTRATIVE | Facility: OTHER | Age: 47
End: 2022-12-13

## 2022-12-13 NOTE — TELEPHONE ENCOUNTER
12/13/22 4:28 PM     [employer] SmartForm used to document caregap status      Elizabeth Bermudez MA

## 2023-01-04 DIAGNOSIS — I10 BENIGN ESSENTIAL HYPERTENSION: ICD-10-CM

## 2023-01-05 RX ORDER — BENAZEPRIL HYDROCHLORIDE 20 MG/1
TABLET ORAL
Qty: 90 TABLET | Refills: 1 | Status: SHIPPED | OUTPATIENT
Start: 2023-01-05

## 2023-01-05 RX ORDER — HYDROCHLOROTHIAZIDE 12.5 MG/1
TABLET ORAL
Qty: 90 TABLET | Refills: 1 | Status: SHIPPED | OUTPATIENT
Start: 2023-01-05

## 2023-01-06 ENCOUNTER — RA CDI HCC (OUTPATIENT)
Dept: OTHER | Facility: HOSPITAL | Age: 48
End: 2023-01-06

## 2023-01-06 NOTE — PROGRESS NOTES
NOT on the BPA- please assess using MEAT for 2023 billing    [facility] [address]  coding opportunities          Chart Reviewed number of suggestions sent to Provider: 1     Patients Insurance        Commercial Insurance: [employer]

## 2023-01-31 ENCOUNTER — OFFICE VISIT (OUTPATIENT)
Dept: FAMILY MEDICINE CLINIC | Facility: CLINIC | Age: 48
End: 2023-01-31

## 2023-01-31 VITALS
RESPIRATION RATE: 16 BRPM | BODY MASS INDEX: 32.73 KG/M2 | HEIGHT: 69 IN | DIASTOLIC BLOOD PRESSURE: 86 MMHG | WEIGHT: 221 LBS | TEMPERATURE: 99.4 F | SYSTOLIC BLOOD PRESSURE: 124 MMHG | HEART RATE: 100 BPM

## 2023-01-31 DIAGNOSIS — J45.20 MILD INTERMITTENT ASTHMA WITHOUT COMPLICATION: ICD-10-CM

## 2023-01-31 DIAGNOSIS — E78.5 DYSLIPIDEMIA: ICD-10-CM

## 2023-01-31 DIAGNOSIS — Z11.4 SCREENING FOR HIV (HUMAN IMMUNODEFICIENCY VIRUS): ICD-10-CM

## 2023-01-31 DIAGNOSIS — E55.9 VITAMIN D DEFICIENCY: ICD-10-CM

## 2023-01-31 DIAGNOSIS — Z12.11 COLON CANCER SCREENING: ICD-10-CM

## 2023-01-31 DIAGNOSIS — Z11.59 NEED FOR HEPATITIS C SCREENING TEST: Primary | ICD-10-CM

## 2023-01-31 DIAGNOSIS — L98.9 SKIN LESION: ICD-10-CM

## 2023-01-31 DIAGNOSIS — C92.41 ACUTE PROMYELOCYTIC LEUKEMIA IN REMISSION (HCC): ICD-10-CM

## 2023-01-31 DIAGNOSIS — E66.09 CLASS 1 OBESITY DUE TO EXCESS CALORIES WITHOUT SERIOUS COMORBIDITY WITH BODY MASS INDEX (BMI) OF 31.0 TO 31.9 IN ADULT: ICD-10-CM

## 2023-01-31 DIAGNOSIS — I10 BENIGN ESSENTIAL HYPERTENSION: ICD-10-CM

## 2023-01-31 DIAGNOSIS — G47.33 OSA (OBSTRUCTIVE SLEEP APNEA): ICD-10-CM

## 2023-01-31 DIAGNOSIS — R53.82 CHRONIC FATIGUE: ICD-10-CM

## 2023-01-31 PROBLEM — B07.8 OTHER VIRAL WARTS: Status: RESOLVED | Noted: 2018-11-08 | Resolved: 2023-01-31

## 2023-01-31 NOTE — ASSESSMENT & PLAN NOTE
Diagnosis of sleep apnea  He states he tries to use CPAP nightly  Some nights he falls asleep on the sofa  Other nights he wakes up without the CPAP on  He is overdue for a follow up with sleep medicine   Weight loss recommended

## 2023-01-31 NOTE — PATIENT INSTRUCTIONS
[facility] [employer] [facility] Contact Info Address Start End Comments   Aida Huffman MD (Family Medicine) Phone: [phone] Fax: [phone]  [address] [place] [address] 1/31/2023 - -   Schedule follow up with sleep medicine      DEBROX ear drops to left ear for one week  Weight Management   WHAT YOU NEED TO KNOW:   Being overweight increases your risk of health conditions such as heart disease, high blood pressure, type 2 diabetes, and certain types of cancer  It can also increase your risk for osteoarthritis, sleep apnea, and other respiratory problems  Aim for a slow, steady weight loss  Even a small amount of weight loss can lower your risk of health problems  DISCHARGE INSTRUCTIONS:   How to lose weight safely:  A safe and healthy way to lose weight is to eat fewer calories and get regular exercise  You can lose up about 1 pound a week by decreasing the number of calories you eat by 500 calories each day  You can decrease calories by eating smaller portion sizes or by cutting out high-calorie foods  Read labels to find out how many calories are in the foods you eat  You can also burn calories with exercise such as walking, swimming, or biking  You will be more likely to keep weight off if you make these changes part of your lifestyle  Exercise at least 30 minutes per day on most days of the week  You can also fit in more physical activity by taking the stairs instead of the elevator or parking farther away from stores  Ask your healthcare provider about the best exercise plan for you  Healthy meal plan for weight management:  A healthy meal plan includes a variety of foods, contains fewer calories, and helps you stay healthy  A healthy meal plan includes the following:     Eat whole-grain foods more often  A healthy meal plan should contain fiber  Fiber is the part of grains, fruits, and vegetables that is not broken down by your body   Whole-grain foods are healthy and provide extra fiber in your diet  Some examples of whole-grain foods are whole-wheat breads and pastas, oatmeal, brown rice, and bulgur  Eat a variety of vegetables every day  Include dark, leafy greens such as spinach, kale, trey greens, and mustard greens  Eat yellow and orange vegetables such as carrots, sweet potatoes, and winter squash  Eat a variety of fruits every day  Choose fresh or canned fruit (canned in its own juice or light syrup) instead of juice  Fruit juice has very little or no fiber  Eat low-fat dairy foods  Drink fat-free (skim) milk or 1% milk  Eat fat-free yogurt and low-fat cottage cheese  Try low-fat cheeses such as mozzarella and other reduced-fat cheeses  Choose meat and other protein foods that are low in fat  Choose beans or other legumes such as split peas or lentils  Choose fish, skinless poultry (chicken or turkey), or lean cuts of red meat (beef or pork)  Before you cook meat or poultry, cut off any visible fat  Use less fat and oil  Try baking foods instead of frying them  Add less fat, such as margarine, sour cream, regular salad dressing, and mayonnaise to foods  Eat fewer high-fat foods  Some examples of high-fat foods include french fries, doughnuts, ice cream, and cakes  Eat fewer sweets  Limit foods and drinks that are high in sugar  This includes candy, cookies, regular soda, and sweetened drinks  Ways to decrease calories:   Eat smaller portions  Use a small plate with smaller servings  Do not eat second helpings  When you eat at a restaurant, ask for a box and place half of your meal in the box before you eat  Share an entrée with someone else  Replace high-calorie snacks with healthy, low-calorie snacks  Choose fresh fruit, vegetables, fat-free rice cakes, or air-popped popcorn instead of potato chips, nuts, or chocolate  Choose water or calorie-free drinks instead of soda or sweetened drinks      Do not shop for groceries when you are hungry  You may be more likely to make unhealthy food choices  Take a grocery list of healthy foods and shop after you have eaten  Eat regular meals  Do not skip meals  Skipping meals can lead to overeating later in the day  This can make it harder for you to lose weight  Eat a healthy snack in place of a meal if you do not have time to eat a regular meal  Talk with a dietitian to help you create a meal plan and schedule that is right for you  Other things to consider as you try to lose weight:   Be aware of situations that may give you the urge to overeat, such as eating while watching television  Find ways to avoid these situations  For example, read a book, go for a walk, or do crafts  Meet with a weight loss support group or friends who are also trying to lose weight  This may help you stay motivated to continue working on your weight loss goals  © Copyright [employer] 2022 Information is for End User's use only and may not be sold, redistributed or otherwise used for commercial purposes  All illustrations and images included in CareNotes® are the copyrighted property of [employer]  or [facility] Mellisa Carrera   The above information is an  only  It is not intended as medical advice for individual conditions or treatments  Talk to your doctor, nurse or pharmacist before following any medical regimen to see if it is safe and effective for you  DASH Eating Plan   WHAT YOU NEED TO KNOW:   The DASH (Dietary Approaches to Stop Hypertension) Eating Plan is designed to help prevent or lower high blood pressure  It can also help to lower LDL (bad) cholesterol and decrease your risk for heart disease  The plan is low in sodium, sugar, unhealthy fats, and total fat  It is high in potassium, calcium, magnesium, and fiber  These nutrients are added when you eat more fruits, vegetables, and whole grains   With the DASH eating plan, you need to eat a certain number of servings from each food group  This will help you get enough of certain nutrients and limit others  The amount of servings you should eat depends on how many calories you need  Your dietitian can help you create meal plans with the right number of servings for each food group  DISCHARGE INSTRUCTIONS:   What you need to know about sodium:  Your dietitian will tell you how much sodium is safe for you to have each day  People with high blood pressure should have no more than 1,500 to 2,300 mg of sodium in a day  A teaspoon (tsp) of salt has 2,300 mg of sodium  This may seem like a difficult goal, but small changes to the foods you eat can make a big difference  Your healthcare provider or dietitian can help you create a meal plan that follows your sodium limit  Read food labels  Food labels can help you choose foods that are low in sodium  The amount of sodium is listed in milligrams (mg)  The % Daily Value (DV) column tells you how much of your daily needs are met by 1 serving of the food for each nutrient listed  Choose foods that have less than 5% of the DV of sodium  These foods are considered low in sodium  Foods that have 20% or more of the DV of sodium are considered high in sodium  Avoid foods that have more than 300 mg of sodium in each serving  Choose foods that say low-sodium, reduced-sodium, or no salt added on the food label  Limit added salt  Do not salt food at the table if you add salt when you cook  Use herbs and spices, such as onions, garlic, and salt-free seasonings to add flavor  Try lemon or lime juice or vinegar to add a tart flavor  Use hot peppers or a small amount of hot pepper sauce to add a spicy flavor   Limit foods high in added salt, such as the following:    Seasonings made with salt, such as garlic salt, celery salt, onion salt, seasoned salt, meat tenderizers, and monosodium glutamate (MSG)    Miso soup and canned or dried soup mixes    Regular soy sauce, barbecue sauce, teriyaki sauce, steak sauce, Worcestershire sauce, and most flavored vinegars    Snack foods, such as salted chips, popcorn, pretzels, pork rinds, salted crackers, and salted nuts    Frozen foods, such as dinners, entrees, vegetables with sauces, and breaded meats    Ask about salt substitutes  Ask your healthcare provider if you may use salt substitutes  Some salt substitutes have ingredients that can be harmful if you have certain health conditions  Choose foods carefully at restaurants  Meals from restaurants, especially fast food restaurants, are often high in sodium  Some restaurants have nutrition information that tells you the amount of sodium in their foods  Ask to have your food prepared with less, or no salt  What you need to know about fats:  Healthy fats include unsaturated fats and omega-3 fatty acids  Unhealthy fats include saturated fats and trans fats  Include healthy fats, such as the following:      Cooking oils, such as soybean, canola, olive, or sunflower    Fatty fish, such as salmon, tuna, mackerel, or sardines    Flaxseed oil or ground flaxseed    ½ cup of cooked beans, such as black beans, kidney beans, or rodriguez beans    1½ ounces of low-sodium nuts, such as almonds or walnuts    Low-sugar, low-sodium peanut butter    Seeds such as chris seeds or sunflower seeds       Limit or do not have unhealthy fats, such as the following:      Foods that contain fat from animals, such as fatty meats, whole milk, butter, and cream    Shortening, stick margarine, palm oil, and coconut oil    Full-fat or creamy salad dressing    Creamy soup    Crackers, chips, and baked goods made with margarine or shortening    Foods that are fried in unhealthy fats    Gravy and sauces, such as Silvio or cheese sauces    What you need to know about carbohydrates (carbs): All carbs break down into sugar  Complex carbs contain more fiber than simple carbs   This means complex carbs go into the bloodstream more slowly and cause less of a blood sugar spike  Try to include more complex carbs and fewer simple carbs  Include complex carbs, such as the followin slice of whole-grain bread    1 ounce of dry cereal that does not contain added sugar    ½ cup of cooked oatmeal    2 ounces of cooked whole-grain pasta    ½ cup of cooked brown rice    Limit or do not have simple carbs, such as the following:      [employer], such as doughnuts, pastries, and cookies    Mixes for cornbread and biscuits    White rice and pasta mixes, such as boxed macaroni and cheese    Instant and cold cereals that contain sugar    Jelly, jam, and ice cream that contain sugar    Condiments such as ketchup    Drinks high in sugar, such as soft drinks, lemonade, and fruit juice    What you need to know about vegetables and fruits:  Vegetables and fruits can be fresh, frozen, or canned  If possible, try to choose low-sodium canned options  Include a variety of vegetables and fruits, such as the followin medium apple, pear, or peach (about ½ cup chopped)    ½ small banana    ½ cup berries, such as blueberries, strawberries, or blackberries    1 cup of raw leafy greens, such as lettuce, spinach, kale, or trey greens    ½ cup of frozen or canned (no added salt) vegetables, such as green beans    ½ cup of fresh, frozen, or canned fruit (canned in light syrup or fruit juice)    ½ cup of vegetable or fruit juice    Limit or do not have vegetables and fruits made in the following ways:      Frozen fruit such as cherries that have added sugar    Fruit in cream or butter sauce    Canned vegetables that are high in sodium    Sauerkraut, pickled vegetables, and other foods prepared in brine    Fried vegetables or vegetables in butter or high-fat sauces    What you need to know about protein foods:    Include lean or low-fat protein foods, such as the following:      Poultry (chicken, turkey) with no skin    Fish (especially fatty fish, such as salmon, fresh tuna, or mackerel)    Lean beef and pork (loin, round, extra lean hamburger)    Egg whites and egg substitutes    1 cup of nonfat (skim) or 1% milk    1½ ounces of fat-free or low-fat cheese    6 ounces of nonfat or low-fat yogurt    Limit or do not have high-fat protein foods, such as the following:      Smoked or cured meat, such as corned beef, mckenna, ham, hot dogs, and sausage    Canned beans and canned meats or spreads, such as potted meats, sardines, anchovies, and imitation seafood    Deli or lunch meats, such as bologna, ham, turkey, and roast beef    High-fat meat (T-bone steak, regular hamburger, and ribs)    Whole eggs and egg yolks    Whole milk, 2% milk, and cream    Regular cheese and processed cheese    Other guidelines to follow:   Maintain a healthy weight  Your risk for heart disease is higher if you are overweight  Your healthcare provider may suggest that you lose weight if you are overweight  You can lose weight by eating fewer calories and foods that have added sugars and fat  The DASH meal plan can help you do this  Decrease calories by eating smaller portions at each meal and fewer snacks  Ask your healthcare provider for more information about how to lose weight  Exercise regularly  Regular exercise can help you reach or maintain a healthy weight  Regular exercise can also help decrease your blood pressure and improve your cholesterol levels  Get 30 minutes or more of moderate exercise each day of the week  To lose weight, get at least 60 minutes of exercise  Talk to your healthcare provider about the best exercise program for you  Limit alcohol  Women should limit alcohol to 1 drink a day  Men should limit alcohol to 2 drinks a day  A drink of alcohol is 12 ounces of beer, 5 ounces of wine, or 1½ ounces of liquor  For more information:   [facility] and [address]  [address] [address]  [place] [address]  Phone: 1- [phone]  Web Address: [facility] no    © [address] [facility] 2022 Information is for End User's use only and may not be sold, redistributed or otherwise used for commercial purposes  All illustrations and images included in CareNotes® are the copyrighted property of [employer]  or Elvia Aquino  The above information is an  only  It is not intended as medical advice for individual conditions or treatments  Talk to your doctor, nurse or pharmacist before following any medical regimen to see if it is safe and effective for you  Hypertension   WHAT YOU NEED TO KNOW:   Hypertension is high blood pressure  Your blood pressure is the force of your blood moving against the walls of your arteries  Hypertension causes your blood pressure to get so high that your heart has to work much harder than normal  This can damage your heart  The cause of hypertension may not be known  This is called essential or primary hypertension  Hypertension caused by another medical condition, such as kidney disease, is called secondary hypertension  DISCHARGE INSTRUCTIONS:   Call your local emergency number (911 in the [address] [place] [address],[address]) or have someone call if:   You have chest pain  You have any of the following signs of a heart attack:      Squeezing, pressure, or pain in your chest    You may  also have any of the following:     Discomfort or pain in your back, neck, jaw, stomach, or arm    Shortness of breath    Nausea or vomiting    Lightheadedness or a sudden cold sweat    You become confused or have trouble speaking  You suddenly feel lightheaded or have trouble breathing  Return to the emergency department if:   You have a severe headache or vision loss  You have weakness in an arm or leg  Call your doctor or cardiologist if:   You feel faint, dizzy, confused, or drowsy      You have been taking your blood pressure medicine but your pressure is higher than your provider says it should be  You have questions or concerns about your condition or care  Medicines: You may  need any of the following:  Antihypertensives  may be used to help lower your blood pressure  Several kinds of medicines are available  Your healthcare provider will choose medicines based on the kind of hypertension you have  You may need more than one type of medicine  Take the medicine exactly as directed  Diuretics  help decrease extra fluid that collects in your body  This will help lower your blood pressure  You may urinate more often while you take this medicine  Cholesterol medicine  helps lower your cholesterol level  A low cholesterol level helps prevent heart disease and makes it easier to control your blood pressure  Take your medicine as directed  Contact your healthcare provider if you think your medicine is not helping or if you have side effects  Tell him or her if you are allergic to any medicine  Keep a list of the medicines, vitamins, and herbs you take  Include the amounts, and when and why you take them  Bring the list or the pill bottles to follow-up visits  Carry your medicine list with you in case of an emergency  Follow up with your doctor or cardiologist as directed: You will need to return to have your blood pressure checked and to have other lab tests done  Write down your questions so you remember to ask them during your visits  Stages of hypertension:       Normal blood pressure is 119/79 or lower   Your healthcare provider may only check your blood pressure each year if it stays at a normal level  Elevated blood pressure is 120/79 to 129/79   This is sometimes called prehypertension  Your healthcare provider may suggest lifestyle changes to help lower your blood pressure to a normal level  He or she may then check it again in 3 to 6 months  Stage 1 hypertension is 130/80  to 139/89    Your provider may recommend lifestyle changes, medication, and checks every 3 to 6 months until your blood pressure is controlled  Stage 2 hypertension is 140/90 or higher   Your provider will recommend lifestyle changes and have you take 2 kinds of hypertension medicines  You will also need to have your blood pressure checked monthly until it is controlled  Manage hypertension:   Check your blood pressure at home  Avoid smoking, caffeine, and exercise at least 30 minutes before checking your blood pressure  Sit and rest for 5 minutes before you take your blood pressure  Extend your arm and support it on a flat surface  Your arm should be at the same level as your heart  Follow the directions that came with your blood pressure monitor  Check your blood pressure 2 times, 1 minute apart, before you take your medicine in the morning  Also check your blood pressure before your evening meal  Keep a record of your readings and bring it to your follow-up visits  Ask your healthcare provider what your blood pressure should be  Manage any other health conditions you have  Health conditions such as diabetes can increase your risk for hypertension  Follow your healthcare provider's instructions and take all your medicines as directed  Ask about all medicines  Certain medicines can increase your blood pressure  Examples include oral birth control pills, decongestants, herbal supplements, and NSAIDs, such as ibuprofen  Your healthcare provider can tell you which medicines are safe for you to take  This includes prescription and over-the-counter medicines  Lifestyle changes you can make to manage hypertension:  Your healthcare provider may recommend you work with a team to manage hypertension  The team may include medical experts such as a dietitian, an exercise or physical therapist, and a behavior therapist  Your family members may be included in helping you create lifestyle changes  Limit sodium (salt) as directed  Too much sodium can affect your fluid balance  Check labels to find low-sodium or no-salt-added foods  Some low-sodium foods use potassium salts for flavor  Too much potassium can also cause health problems  Your healthcare provider will tell you how much sodium and potassium are safe for you to have in a day  He or she may recommend that you limit sodium to 2,300 mg a day  Follow the meal plan recommended by your healthcare provider  A dietitian or your provider can give you more information on low-sodium plans or the DASH (Dietary Approaches to Stop Hypertension) eating plan  The DASH plan is low in sodium, processed sugar, unhealthy fats, and total fat  It is high in potassium, calcium, and fiber  These can be found in vegetables, fruit, and whole-grain foods  Be physically active throughout the day  Physical activity, such as exercise, can help control your blood pressure and your weight  Be physically active for at least 30 minutes per day, on most days of the week  Include aerobic activity, such as walking or riding a bicycle  Also include strength training at least 2 times each week  Your healthcare providers can help you create a physical activity plan  Decrease stress  This may help lower your blood pressure  Learn ways to relax, such as deep breathing or listening to music  Limit alcohol as directed  Alcohol can increase your blood pressure  A drink of alcohol is 12 ounces of beer, 5 ounces of wine, or 1½ ounces of liquor  Do not smoke  Nicotine and other chemicals in cigarettes and cigars can increase your blood pressure and also cause lung damage  Ask your healthcare provider for information if you currently smoke and need help to quit  E-cigarettes or smokeless tobacco still contain nicotine  Talk to your healthcare provider before you use these products  © Copyright [employer] 2022 Information is for End User's use only and may not be sold, redistributed or otherwise used for commercial purposes   All illustrations and images included in CareNotes® are the copyrighted property of [employer]  or Elvia Carrera   The above information is an  only  It is not intended as medical advice for individual conditions or treatments  Talk to your doctor, nurse or pharmacist before following any medical regimen to see if it is safe and effective for you  Good fat  Good fats come mainly from vegetables, nuts, seeds, and fish  They differ from saturated fats by having fewer hydrogen atoms bonded to their carbon chains  Healthy fats are liquid at room temperature, not solid  There are two broad categories of beneficial fats: monounsaturated and polyunsaturated fats  Monounsaturated fats  When you dip your bread in olive oil at an [place], you're getting mostly monounsaturated fat  Monounsaturated fats have a single carbon-to-carbon double bond  The result is that it has two fewer hydrogen atoms than a saturated fat and a bend at the double bond  This structure keeps monounsaturated fats liquid at room temperature  Good sources of monounsaturated fats are olive oil, peanut oil, canola oil, avocados, and most nuts, as well as high-oleic safflower and sunflower oils  The discovery that monounsaturated fat could be healthful came from the [place] Study during the 1960s  It revealed that people in [place] and other parts of the [address] [facility] region enjoyed a low rate of heart disease despite a high-fat diet  The main fat in their diet, though, was not the saturated animal fat common in countries with higher rates of heart disease  It was olive oil, which contains mainly monounsaturated fat  This finding produced a surge of interest in olive oil and the "Mediterranean diet," a style of eating regarded as a healthful choice today    Although there's no recommended daily intake of monounsaturated fats, the [employer] recommends using them as much as possible along with polyunsaturated fats to replace saturated and trans fats  Polyunsaturated fats  When you pour liquid cooking oil into a pan, there's a good chance you're using polyunsaturated fat  Corn oil, sunflower oil, and safflower oil are common examples  Polyunsaturated fats are essential fats  That means they're required for normal body functions but your body can't make them  So you must get them from food  Polyunsaturated fats are used to build cell membranes and the covering of nerves  They are needed for blood clotting, muscle movement, and inflammation  A polyunsaturated fat has two or more double bonds in its carbon chain  There are two main types of polyunsaturated fats: omega-3 fatty acids and omega-6 fatty acids  The numbers refer to the distance between the beginning of the carbon chain and the first double bond  Both types offer health benefits  Eating polyunsaturated fats in place of saturated fats or highly refined carbohydrates reduces harmful LDL cholesterol and improves the cholesterol profile  It also lowers triglycerides  Good sources of omega-3 fatty acids include fatty fish such as salmon, mackerel, and sardines, flaxseeds, walnuts, canola oil, and unhydrogenated soybean oil  Omega-3 fatty acids may help prevent and even treat heart disease and stroke  In addition to reducing blood pressure, raising HDL, and lowering triglycerides, polyunsaturated fats may help prevent lethal heart rhythms from arising  Evidence also suggests they may help reduce the need for corticosteroid medications in people with rheumatoid arthritis  Studies linking omega-3s to a wide range of other health improvements, including reducing risk of dementia, are inconclusive, and some of them have major flaws, according to a systematic review of the evidence by the Agency for [employer] and Quality  Omega-6 fatty acids have also been linked to protection against heart disease   Foods rich in linoleic acid and other omega-6 fatty acids include vegetable oils such as safflower, soybean, sunflower, walnut, and corn oils  Cholesterol and Your Health   AMBULATORY CARE:   Cholesterol  is a waxy, fat-like substance  Your body uses cholesterol to make hormones and new cells, and to protect nerves  Cholesterol is made by your body  It also comes from certain foods you eat, such as meat and dairy products  Your healthcare provider can help you set goals for your cholesterol levels  He or she can help you create a plan to meet your goals  Cholesterol level goals: Your cholesterol level goals depend on your risk for heart disease, your age, and your other health conditions  The following are general guidelines: Total cholesterol  includes low-density lipoprotein (LDL), high-density lipoprotein (HDL), and triglyceride levels  The total cholesterol level should be lower than 200 mg/dL and is best at about 150 mg/dL  LDL cholesterol  is called bad cholesterol  because it forms plaque in your arteries  As plaque builds up, your arteries become narrow, and less blood flows through  When plaque decreases blood flow to your heart, you may have chest pain  If plaque completely blocks an artery that brings blood to your heart, you may have a heart attack  Plaque can break off and form blood clots  Blood clots may block arteries in your brain and cause a stroke  The level should be less than 130 mg/dL and is best at about 100 mg/dL  HDL cholesterol  is called good cholesterol  because it helps remove LDL cholesterol from your arteries  It does this by attaching to LDL cholesterol and carrying it to your liver  Your liver breaks down LDL cholesterol so your body can get rid of it  High levels of HDL cholesterol can help prevent a heart attack and stroke  Low levels of HDL cholesterol can increase your risk for heart disease, heart attack, and stroke  The level should be 60 mg/dL or higher      Triglycerides  are a type of fat that store energy from foods you eat  High levels of triglycerides also cause plaque buildup  This can increase your risk for a heart attack or stroke  If your triglyceride level is high, your LDL cholesterol level may also be high  The level should be less than 150 mg/dL  Any of the following can increase your risk for high cholesterol:   Smoking cigarettes    Being overweight or obese, or not getting enough exercise    Drinking large amounts of alcohol    A medical condition such as hypertension (high blood pressure) or diabetes    Certain genes passed from your parents to you    Age older than 65 years    What you need to know about having your cholesterol levels checked: Adults 21to 39years of age should have their cholesterol levels checked every 4 to 6 years  Adults 45 years or older should have their cholesterol checked every 1 to 2 years  You may need your cholesterol checked more often, or at a younger age, if you have risk factors for heart disease  You may also need to have your cholesterol checked more often if you have other health conditions, such as diabetes  Blood tests are used to check cholesterol levels  Blood tests measure your levels of triglycerides, LDL cholesterol, and HDL cholesterol  How healthy fats affect your cholesterol levels:  Healthy fats, also called unsaturated fats, help lower LDL cholesterol and triglyceride levels  Healthy fats include the following:  Monounsaturated fats  are found in foods such as olive oil, canola oil, avocado, nuts, and olives  Polyunsaturated fats,  such as omega 3 fats, are found in fish, such as salmon, trout, and tuna  They can also be found in plant foods such as flaxseed, walnuts, and soybeans  How unhealthy fats affect your cholesterol levels:  Unhealthy fats increase LDL cholesterol and triglyceride levels   They are found in foods high in cholesterol, saturated fat, and trans fat:  Cholesterol  is found in eggs, dairy, and meat     Saturated fat  is found in butter, cheese, ice cream, whole milk, and coconut oil  Saturated fat is also found in meat, such as sausage, hot dogs, and bologna  Trans fat  is found in liquid oils and is used in fried and baked foods  Foods that contain trans fats include chips, crackers, muffins, sweet rolls, microwave popcorn, and cookies  Treatment  for high cholesterol will also decrease your risk of heart disease, heart attack, and stroke  Treatment may include any of the following:  Lifestyle changes  may include food, exercise, weight loss, and quitting smoking  You may also need to decrease the amount of alcohol you drink  Your healthcare provider will want you to start with lifestyle changes  Other treatment may be added if lifestyle changes are not enough  Your healthcare provider may recommend you work with a team to manage hyperlipidemia  The team may include medical experts such as a dietitian, an exercise or physical therapist, and a behavior therapist  Your family members may be included in helping you create lifestyle changes  Medicines  may be given to lower your LDL cholesterol, triglyceride levels, or total cholesterol level  You may need medicines to lower your cholesterol if any of the following is true:    You have a history of stroke, TIA, unstable angina, or a heart attack  Your LDL cholesterol level is 190 mg/dL or higher  You are age 36 to 76 years, have diabetes or heart disease risk factors, and your LDL cholesterol is 70 mg/dL or higher  Supplements  include fish oil, red yeast rice, and garlic  Fish oil may help lower your triglyceride and LDL cholesterol levels  It may also increase your HDL cholesterol level  Red yeast rice may help decrease your total cholesterol level and LDL cholesterol level  Garlic may help lower your total cholesterol level  Do not take any supplements without talking to your healthcare provider      Food changes you can make to lower your cholesterol levels:  A dietitian can help you create a healthy eating plan  He or she can show you how to read food labels and choose foods low in saturated fat, trans fats, and cholesterol  Decrease the total amount of fat you eat  Choose lean meats, fat-free or 1% fat milk, and low-fat dairy products, such as yogurt and cheese  Try to limit or avoid red meats  Limit or do not eat fried foods or baked goods, such as cookies  Replace unhealthy fats with healthy fats  Cook foods in olive oil or canola oil  Choose soft margarines that are low in saturated fat and trans fat  Seeds, nuts, and avocados are other examples of healthy fats  Eat foods with omega-3 fats  Examples include salmon, tuna, mackerel, walnuts, and flaxseed  Eat fish 2 times per week  Pregnant women should not eat fish that have high levels of mercury, such as shark, swordfish, and stephanie mackerel  Increase the amount of high-fiber foods you eat  High-fiber foods can help lower your LDL cholesterol  Aim to get between 20 and 30 grams of fiber each day  Fruits and vegetables are high in fiber  Eat at least 5 servings each day  Other high-fiber foods are whole-grain or whole-wheat breads, pastas, or cereals, and brown rice  Eat 3 ounces of whole-grain foods each day  Increase fiber slowly  You may have abdominal discomfort, bloating, and gas if you add fiber to your diet too quickly  Eat healthy protein foods  Examples include low-fat dairy products, skinless chicken and turkey, fish, and nuts  Limit foods and drinks that are high in sugar  Your dietitian or healthcare provider can help you create daily limits for high-sugar foods and drinks  The limit may be lower if you have diabetes or another health condition  Limits can also help you lose weight if needed  Lifestyle changes you can make to lower your cholesterol levels:   Maintain a healthy weight    Ask your healthcare provider what a healthy weight is for you  Ask him or her to help you create a weight loss plan if needed  Weight loss can decrease your total cholesterol and triglyceride levels  Weight loss may also help keep your blood pressure at a healthy level  Be physically active throughout the day  Physical activity, such as exercise, can help lower your total cholesterol level and maintain a healthy weight  Physical activity can also help increase your HDL cholesterol level  Work with your healthcare provider to create an program that is right for you  Get at least 30 to 40 minutes of moderate physical activity most days of the week  Examples of exercise include brisk walking, swimming, or biking  Also include strength training at least 2 times each week  Your healthcare providers can help you create a physical activity plan  Do not smoke  Nicotine and other chemicals in cigarettes and cigars can raise your cholesterol levels  Ask your healthcare provider for information if you currently smoke and need help to quit  E-cigarettes or smokeless tobacco still contain nicotine  Talk to your healthcare provider before you use these products  Limit or do not drink alcohol  Alcohol can increase your triglyceride levels  Ask your healthcare provider before you drink alcohol  Ask how much is okay for you to drink in 24 hours or 1 week  Follow up with your doctor as directed:  Write down your questions so you remember to ask them during your visits  © Copyright [employer] 2022 Information is for End User's use only and may not be sold, redistributed or otherwise used for commercial purposes  All illustrations and images included in CareNotes® are the copyrighted property of [employer]  or Elvia Aquino  The above information is an  only  It is not intended as medical advice for individual conditions or treatments   Talk to your doctor, nurse or pharmacist before following any medical regimen to see if it is safe and effective for you

## 2023-01-31 NOTE — ASSESSMENT & PLAN NOTE
The patient continues with his albuterol inhaler PRN  He states he feels he is using this more often  He is being followed by an allergist who placed him on a second inhaler but the patient does not remember which one  I will reach out to his allergist for the last office note

## 2023-01-31 NOTE — PROGRESS NOTES
[facility]    NAME: Camilla Ross  AGE: 49 y o  SEX: male  : 1975     DATE: 2023     Assessment and Plan:     Problem List Items Addressed This Visit        Respiratory    Mild intermittent asthma without complication     The patient continues with his albuterol inhaler PRN  He states he feels he is using this more often  He is being followed by an allergist who placed him on a second inhaler but the patient does not remember which one  I will reach out to his allergist for the last office note  GWENDOLYN (obstructive sleep apnea)     Diagnosis of sleep apnea  He states he tries to use CPAP nightly  Some nights he falls asleep on the sofa  Other nights he wakes up without the CPAP on  He is overdue for a follow up with sleep medicine  Weight loss recommended            Cardiovascular and Mediastinum    Benign essential hypertension     Blood pressure in the office today is 124/86  He continues on his Lotensin and HCTZ  Low salt diet recommended, weight loss recommended         Relevant Orders    CBC and differential    Comprehensive metabolic panel       Musculoskeletal and Integument    Skin lesion       Shave biopsy performed in the office in August which showed verrucous keratosis  The lesion has begun to reappear  Recommend to the patient over the counter wart removal preparations or dermatology consult for complete removal       Final Diagnosis   A  Skin lesion, Leg, Left, 0 8 mm sample taken with shave biopsy at the left medial thigh:  - Benign verrucous keratosis  - Special stain for fungus (PAS) negative  - Negative for malignancy on multiple examined levels                     Other    Acute promyelocytic leukemia in remission ([facility] [address] )     Continued follow up with Dr Kendall Croft in oncology         Class 1 obesity due to excess calories without serious comorbidity with body mass index (BMI) of 31 0 to 31 9 in adult     Lifestyle modifications discussed         Relevant Orders    TSH, 3rd generation with Free T4 reflex    Dyslipidemia     Due for lipid panel  Not on a statin  Lifestyle modifications, low fat diet and exercise recommended         Relevant Orders    Lipid Panel with Direct LDL reflex    Chronic fatigue   Other Visit Diagnoses     Need for hepatitis C screening test    -  Primary    Relevant Orders    Hepatitis C antibody    Vitamin D deficiency        Relevant Orders    Vitamin D 25 hydroxy    Screening for HIV (human immunodeficiency virus)        Relevant Orders    HIV 1/2 AG/AB w Reflex [facility] for 2 yr old and above    Colon cancer screening        Relevant Orders    Ambulatory referral for colonoscopy          BMI Counseling: Body mass index is 33 11 kg/m²  The BMI is above normal  Nutrition recommendations include decreasing portion sizes, encouraging healthy choices of fruits and vegetables, limiting drinks that contain sugar, moderation in carbohydrate intake, increasing intake of lean protein, reducing intake of saturated and trans fat and reducing intake of cholesterol  Exercise recommendations include moderate physical activity 150 minutes/week and exercising 3-5 times per week  Rationale for BMI follow-up plan is due to patient being overweight or obese  Depression Screening and Follow-up Plan: Patient was screened for depression during today's encounter  They screened negative with a PHQ-2 score of 0  Return in about 6 months (around 7/31/2023) for Tanya Pruitt, yearly physical exam      Chief Complaint:     Chief Complaint   Patient presents with   • Follow-up        History of Present Illness: The patient presents to the office today for follow-up  He continues to follow with hematology for his history of leukemia which is in remission  He is overdue for follow-up with his sleep medicine provider and I did recommend he make this appointment  Blood work has been ordered    The patient has never had a colonoscopy and I have referred him for his baseline screening colonoscopy  I will contact the patient with the results of his blood work  He will be seen back in the office in 6 months     Review of Systems:     Review of Systems   Constitutional: Positive for fatigue (chronic)  HENT: Negative  Negative for congestion, postnasal drip, rhinorrhea and trouble swallowing  Eyes: Negative  Negative for visual disturbance  Respiratory: Negative  Negative for choking and shortness of breath  Cardiovascular: Negative  Negative for chest pain  Gastrointestinal: Negative  Endocrine: Negative  Genitourinary: Negative  Musculoskeletal: Negative  Negative for arthralgias, back pain, myalgias and neck pain  Skin: Positive for color change  Neurological: Negative for dizziness and headaches  Psychiatric/Behavioral: Negative           Problem List:     Patient Active Problem List   Diagnosis   • Acute promyelocytic leukemia in remission [facility])   • Benign essential hypertension   • Seasonal allergies   • Facial skin lesion   • Class 1 obesity due to excess calories without serious comorbidity with body mass index (BMI) of 31 0 to 31 9 in adult   • Mild intermittent asthma without complication   • Dyslipidemia   • GWENDOLYN (obstructive sleep apnea)   • Skin lesion   • Chronic fatigue        Objective:     /86   Pulse 100   Temp 99 4 °F (37 4 °C) (Tympanic)   Resp 16   Ht 5' 8 5" (1 74 m)   Wt 100 kg (221 lb)   BMI 33 11 kg/m²     Current Outpatient Medications   Medication Sig Dispense Refill   • albuterol (VENTOLIN HFA) 90 mcg/act inhaler Use 2 puffs every 4-6 hr  PRN for chest tightness or wheezing 1 Inhaler 3   • benazepril (LOTENSIN) 20 mg tablet TAKE 1 TABLET BY MOUTH EVERY DAY 90 tablet 1   • hydrochlorothiazide (HYDRODIURIL) 12 5 mg tablet TAKE 1 TABLET BY MOUTH EVERY DAY 90 tablet 1   • multivitamin (THERAGRAN) TABS Take 1 tablet by mouth daily       No current facility-administered medications for this visit  Physical Exam  Vitals and nursing note reviewed  Constitutional:       Appearance: Normal appearance  He is well-developed  He is obese  HENT:      Head: Normocephalic and atraumatic  Right Ear: Tympanic membrane, ear canal and external ear normal  There is no impacted cerumen  Left Ear: Tympanic membrane, ear canal and external ear normal  There is no impacted cerumen  Nose: Nose normal       Mouth/Throat:      Mouth: Mucous membranes are moist       Pharynx: Oropharynx is clear  Eyes:      Extraocular Movements: Extraocular movements intact  Conjunctiva/sclera: Conjunctivae normal       Pupils: Pupils are equal, round, and reactive to light  Cardiovascular:      Rate and Rhythm: Normal rate and regular rhythm  Pulses: Normal pulses  Heart sounds: Normal heart sounds  No murmur heard  Pulmonary:      Effort: Pulmonary effort is normal  No respiratory distress  Breath sounds: Normal breath sounds  Abdominal:      General: Bowel sounds are normal  There is no distension  Palpations: Abdomen is soft  There is no mass  Tenderness: There is no abdominal tenderness  There is no guarding or rebound  Hernia: No hernia is present  Musculoskeletal:         General: Normal range of motion  Cervical back: Normal range of motion and neck supple  Skin:     General: Skin is warm and dry  Neurological:      General: No focal deficit present  Mental Status: He is alert and oriented to person, place, and time  Psychiatric:         Mood and Affect: Mood normal          Behavior: Behavior normal          Thought Content:  Thought content normal          Judgment: Judgment normal          Estevan Anthony, [id number] [address]

## 2023-02-01 PROBLEM — U07.1 COVID-19 VIRUS INFECTION: Status: RESOLVED | Noted: 2020-12-11 | Resolved: 2023-02-01

## 2023-02-01 NOTE — ASSESSMENT & PLAN NOTE
Blood pressure in the office today is 124/86  He continues on his Lotensin and HCTZ    Low salt diet recommended, weight loss recommended

## 2023-02-01 NOTE — ASSESSMENT & PLAN NOTE
Due for lipid panel  Not on a statin    Lifestyle modifications, low fat diet and exercise recommended

## 2023-02-01 NOTE — ASSESSMENT & PLAN NOTE
Shave biopsy performed in the office in August which showed verrucous keratosis  The lesion has begun to reappear  Recommend to the patient over the counter wart removal preparations or dermatology consult for complete removal       Final Diagnosis   A  Skin lesion, Leg, Left, 0 8 mm sample taken with shave biopsy at the left medial thigh:  - Benign verrucous keratosis  - Special stain for fungus (PAS) negative  - Negative for malignancy on multiple examined levels

## 2023-02-06 ENCOUNTER — HOSPITAL ENCOUNTER (EMERGENCY)
Facility: HOSPITAL | Age: 48
Discharge: HOME | End: 2023-02-06
Attending: EMERGENCY MEDICINE | Admitting: EMERGENCY MEDICINE

## 2023-02-06 VITALS
RESPIRATION RATE: 20 BRPM | OXYGEN SATURATION: 96 % | SYSTOLIC BLOOD PRESSURE: 159 MMHG | DIASTOLIC BLOOD PRESSURE: 92 MMHG | HEART RATE: 89 BPM | TEMPERATURE: 97.9 F

## 2023-02-06 DIAGNOSIS — J45.901 EXACERBATION OF ASTHMA, UNSPECIFIED ASTHMA SEVERITY, UNSPECIFIED WHETHER PERSISTENT: Primary | ICD-10-CM

## 2023-02-06 RX ORDER — SODIUM CHLORIDE FOR INHALATION 0.9 %
12 VIAL, NEBULIZER (ML) INHALATION ONCE
Status: COMPLETED | OUTPATIENT
Start: 2023-02-06 | End: 2023-02-06

## 2023-02-06 RX ORDER — PREDNISONE 20 MG/1
60 TABLET ORAL ONCE
Status: COMPLETED | OUTPATIENT
Start: 2023-02-06 | End: 2023-02-06

## 2023-02-06 RX ORDER — PREDNISONE 20 MG/1
40 TABLET ORAL DAILY
Qty: 8 TABLET | Refills: 0 | Status: SHIPPED | OUTPATIENT
Start: 2023-02-07 | End: 2023-02-11

## 2023-02-06 RX ORDER — ALBUTEROL SULFATE 2.5 MG/3ML
2.5 SOLUTION RESPIRATORY (INHALATION) EVERY 6 HOURS PRN
Qty: 75 ML | Refills: 0 | Status: SHIPPED | OUTPATIENT
Start: 2023-02-06

## 2023-02-06 RX ADMIN — ISODIUM CHLORIDE 12 ML: 0.03 SOLUTION RESPIRATORY (INHALATION) at 21:50

## 2023-02-06 RX ADMIN — PREDNISONE 60 MG: 20 TABLET ORAL at 21:50

## 2023-02-06 RX ADMIN — ALBUTEROL SULFATE 10 MG: 2.5 SOLUTION RESPIRATORY (INHALATION) at 21:50

## 2023-02-06 RX ADMIN — IPRATROPIUM BROMIDE 1 MG: 0.5 SOLUTION RESPIRATORY (INHALATION) at 21:50

## 2023-02-07 NOTE — DISCHARGE INSTRUCTIONS
You were seen in the ED for shortness of breath  You were treated with a breathing treatment  A prescription for steroids was sent to your pharmacy  Please take as prescribed  Return to the ED for any worsening shortness of breath,chest pain, or any other new or concerning symptoms

## 2023-02-07 NOTE — ED PROVIDER NOTES
History  Chief Complaint   Patient presents with   • Asthma     Pt has chronic asthma, takes inhaler, has been taking it but no relief  Pt reports asthma over past couple days but worsening today  C/o difficulty breathing at all times  No other complaints  95% on room air     Patient is a 59-year-old male, past medical history of asthma, who presents to the emergency department for shortness of breath  Patient states that over the past couple of days he has progressively gotten more short of breath  He has tried his home inhalers with minimal relief  He states tonight was the worst, prompting visit to the emergency department  Patient states that shortness of breath is identical to prior asthma exacerbations  No other modifying factors  Associated symptoms: Intermittent dry cough  Denies any chest pain, fevers, chills, nausea, vomiting, or any other new or concerning symptoms  No other complaints or concerns at this time  Prior to Admission Medications   Prescriptions Last Dose Informant Patient Reported? Taking?    albuterol (VENTOLIN HFA) 90 mcg/act inhaler   No No   Sig: Use 2 puffs every 4-6 hr  PRN for chest tightness or wheezing   benazepril (LOTENSIN) 20 mg tablet   No No   Sig: TAKE 1 TABLET BY MOUTH EVERY DAY   hydrochlorothiazide (HYDRODIURIL) 12 5 mg tablet   No No   Sig: TAKE 1 TABLET BY MOUTH EVERY DAY   multivitamin (THERAGRAN) TABS   Yes No   Sig: Take 1 tablet by mouth daily      Facility-Administered Medications: None       Past Medical History:   Diagnosis Date   • 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    • 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[facility] )    • Allergic rhinitis    • Allergic rhinitis 3/8/2014   • Asthma    • COVID-19 virus infection 12/11/2020   • Essential hypertension, benign    • Leukemia ([facility] )    • Obesity    • Personal history of other diseases of respiratory system        Past Surgical History:   Procedure Laterality Date   • BONE MARROW BIOPSY W/ ASPIRATION N/A • PORTACATH PLACEMENT Right    • WISDOM TOOTH EXTRACTION         Family History   Problem Relation Age of Onset   • Hypertension Father      I have reviewed and agree with the history as documented  E-Cigarette/Vaping   • E-Cigarette Use Never User      E-Cigarette/Vaping Substances     Social History     Tobacco Use   • Smoking status: Never   • Smokeless tobacco: Never   Vaping Use   • Vaping Use: Never used   Substance Use Topics   • Alcohol use: No   • Drug use: No        Review of Systems   Constitutional: Negative for chills and fever  Respiratory: Positive for cough and shortness of breath  Cardiovascular: Negative for chest pain  All other systems reviewed and are negative  Physical Exam  ED Triage Vitals [02/06/23 2007]   Temperature Pulse Respirations Blood Pressure SpO2   97 9 °F (36 6 °C) 89 20 159/92 96 %      Temp Source Heart Rate Source Patient Position - Orthostatic VS BP Location FiO2 (%)   Oral Monitor -- -- --      Pain Score       --             Orthostatic Vital Signs  Vitals:    02/06/23 2007   BP: 159/92   Pulse: 89       Physical Exam  Vitals and nursing note reviewed  Constitutional:       General: He is not in acute distress  Appearance: He is well-developed  He is not diaphoretic  HENT:      Head: Normocephalic and atraumatic  Right Ear: External ear normal       Left Ear: External ear normal       Nose: Nose normal    Eyes:      General: Lids are normal  No scleral icterus  Cardiovascular:      Rate and Rhythm: Normal rate and regular rhythm  Heart sounds: Normal heart sounds  No murmur heard  No friction rub  No gallop  Pulmonary:      Effort: Pulmonary effort is normal  No respiratory distress  Breath sounds: Examination of the right-upper field reveals wheezing  Examination of the left-upper field reveals wheezing  Examination of the right-middle field reveals wheezing  Examination of the left-middle field reveals wheezing   Examination of the right-lower field reveals wheezing  Examination of the left-lower field reveals wheezing  Wheezing present  No rales  Musculoskeletal:         General: No deformity  Normal range of motion  Cervical back: Normal range of motion and neck supple  Skin:     General: Skin is warm and dry  Neurological:      General: No focal deficit present  Mental Status: He is alert  Psychiatric:         Mood and Affect: Mood normal          Behavior: Behavior normal          ED Medications  Medications   albuterol inhalation solution 10 mg (10 mg Nebulization Given 2/6/23 2150)   ipratropium (ATROVENT) 0 02 % inhalation solution 1 mg (1 mg Nebulization Given 2/6/23 2150)   sodium chloride 0 9 % inhalation solution 12 mL (12 mL Nebulization Given 2/6/23 2150)   predniSONE tablet 60 mg (60 mg Oral Given 2/6/23 2150)       Diagnostic Studies  Results Reviewed     None                 No orders to display         Procedures  Procedures      ED Course  ED Course as of 02/07/23 0643   [facility] Feb 06, 2023 2240 Patient was re-evaluated  Resting comfortably  Feels much better  Wheezing resolved  As no further testing or treatment is indicated/necessary at this time, will discharge  I had a detailed discussion with the patient and/or guardian regarding the historical points, exam findings, and any diagnostic results supporting the discharge diagnosis  Discharge instructions reviewed with patient and/or family/caregiver  Instructions given to return to the emergency department if symptoms worsen or persist, or if there are any questions or concerns that arise at home  Patient and/or guardian verbalized understanding and agreed with the plan of care  SBIRT 22yo+    Flowsheet Row Most Recent Value   SBIRT (23 yo +)    In order to provide better care to our patients, we are screening all of our patients for alcohol and drug use  Would it be okay to ask you these screening questions?  Yes Filed at: 02/06/2023 2304   Initial Alcohol Screen: US AUDIT-C     1  How often do you have a drink containing alcohol? 0 Filed at: 02/06/2023 2304   2  How many drinks containing alcohol do you have on a typical day you are drinking? 0 Filed at: 02/06/2023 2304   3a  Male UNDER 65: How often do you have five or more drinks on one occasion? 0 Filed at: 02/06/2023 2304   3b  FEMALE Any Age, or MALE 65+: How often do you have 4 or more drinks on one occassion? 0 Filed at: 02/06/2023 2304   Audit-C Score 0 Filed at: 02/06/2023 2304   YOKO: How many times in the past year have you    Used an illegal drug or used a prescription medication for non-medical reasons? Never Filed at: 02/06/2023 2304                Medical Decision Making  Patient is a 49 y o  male with a past medical history of asthma who presents to the ED for shortness of breath  Patient is nontoxic, well-appearing  Vitals are stable  Patient does have diffuse wheezing on exam     Patient's presentation is most consistent with an acute asthma exacerbation as the patient's signs and symptoms are similar to prior flares without overt deviations from normal exacerbations  Other possible etiologies include pneumonia, seasonal allergies, and viral illness however I feel these are less likely given no other signs or symptoms  Low suspicion for alternate etiologies such as pneumothorax and acute PE ([place])  Presentation not consistent with other acute cardiopulmonary causes including ACS, CHF, or cardiac effusion  Plan for trial of breathing treatments and steroids  No clear indications for a chest xray at this time  Portions of the record may have been created with voice recognition software  Occasional wrong word or "sound a like" substitutions may have occurred due to the inherent limitations of voice recognition software  Read the chart carefully and recognize, using context, where substitutions have occurred      Exacerbation of asthma, unspecified asthma severity, unspecified whether persistent: complicated acute illness or injury  Risk  Prescription drug management  Disposition  Final diagnoses:   Exacerbation of asthma, unspecified asthma severity, unspecified whether persistent     Time reflects when diagnosis was documented in both MDM as applicable and the Disposition within this note     Time User Action Codes Description Comment    2/6/2023 10:32 PM Para Jarvis Add [[id number]] Exacerbation of asthma, unspecified asthma severity, unspecified whether persistent       ED Disposition     ED Disposition   Discharge    Condition   Stable    Date/Time   Mon Feb 6, 2023 10:32 PM    [address] discharge to home/self care                 Follow-up Information     Follow up With Specialties Details Why Contact Info Additional [address] [facility], [address] [place] [address] Internal Medicine   [facility] [address] [facility]  [phone]       [address] [place] [address] Emergency Department Emergency Medicine  As needed [address] [phone]  3 [facility] 64 [facility] Emergency Department, [address], [address]          Discharge Medication List as of 2/6/2023 10:34 PM      START taking these medications    Details   predniSONE 20 mg tablet Take 2 tablets (40 mg total) by mouth daily for 4 days Do not start before February 7, 2023 , Starting Tue 2/7/2023, Until Sat 2/11/2023, Normal         CONTINUE these medications which have NOT CHANGED    Details   albuterol (VENTOLIN HFA) 90 mcg/act inhaler Use 2 puffs every 4-6 hr  PRN for chest tightness or wheezing, Normal      benazepril (LOTENSIN) 20 mg tablet TAKE 1 TABLET BY MOUTH EVERY DAY, Normal      hydrochlorothiazide (HYDRODIURIL) 12 5 mg tablet TAKE 1 TABLET BY MOUTH EVERY DAY, Normal      multivitamin (THERAGRAN) TABS Take 1 tablet by mouth daily, Historical Med           No discharge procedures on file  PDMP Review     None           ED Provider  Attending physically available and evaluated Whitney Molly FLETCHER managed the patient along with the ED Attending      Electronically Signed by         Paola Almonte DO  02/07/23 8959

## 2023-02-09 ENCOUNTER — TELEPHONE (OUTPATIENT)
Dept: GASTROENTEROLOGY | Facility: CLINIC | Age: 48
End: 2023-02-09

## 2023-02-09 ENCOUNTER — PREP FOR PROCEDURE (OUTPATIENT)
Dept: GASTROENTEROLOGY | Facility: CLINIC | Age: 48
End: 2023-02-09

## 2023-02-09 ENCOUNTER — NURSE TRIAGE (OUTPATIENT)
Dept: OTHER | Facility: OTHER | Age: 48
End: 2023-02-09

## 2023-02-09 DIAGNOSIS — Z12.11 SCREENING FOR COLON CANCER: Primary | ICD-10-CM

## 2023-02-09 NOTE — TELEPHONE ENCOUNTER
Regarding: Medication concern  ----- Message from Oli Fairbanks sent at 2/9/2023  6:08 PM EST -----  " I was in the ER on Monday for a Asthma attack they prescribe me predniSONE 20 mg tablet  I took one Tuesday and Wednesday but I realized that I have red breanna in my body I need to speak with someone before continue taking the medication  "

## 2023-02-09 NOTE — TELEPHONE ENCOUNTER
Reason for Disposition  • Taking new prescription medicine  (Exceptions: finished taking new prescription antibiotic OR questions about flushing from niacin)    Answer Assessment - Initial Assessment Questions  1  APPEARANCE of RASH: "Describe the rash " (e g , spots, blisters, raised areas, skin peeling, scaly)      Red blotches     2  SIZE: "How big are the spots?" (e g , tip of pen, eraser, coin; inches, centimeters)     Bigger than a quarter size     3  LOCATION: "Where is the rash located?"      Stomach and chest     4  COLOR: "What color is the rash?" (Note: It is difficult to assess rash color in people with darker-colored skin  When this situation occurs, simply ask the caller to describe what they see )     Red    5  ONSET: "When did the rash begin?"      Noticed today     6  FEVER: "Do you have a fever?" If Yes, ask: "What is your temperature, how was it measured, and when did it start?"      Denies    7  ITCHING: "Does the rash itch?" If Yes, ask: "How bad is the itch?" (Scale 1-10; or mild, moderate, severe)      Denies    8  CAUSE: "What do you think is causing the rash?"    Medication reaction    9  NEW MEDICATION: "What new medication are you taking?" (e g , name of antibiotic) "When did you start taking this medication?"  Prednisone 40 mg, started on 2/7/23    10   OTHER SYMPTOMS: "Do you have any other symptoms?" (e g , sore throat, fever, joint pain)       Denies    Protocols used: RASH - WIDESPREAD ON DRUGS-ADULT-

## 2023-02-09 NOTE — TELEPHONE ENCOUNTER
Scheduled date of colonoscopy (as of today): 4/27/23  Physician performing colonoscopy: BENNIE  Location of colonoscopy:  [facility]

## 2023-02-10 NOTE — ED ATTENDING ATTESTATION
2/6/2023  Melida FLETCHER DO, saw and evaluated the patient  I have discussed the patient with the resident/non-physician practitioner and agree with the resident's/non-physician practitioner's findings, Plan of Care, and MDM as documented in the resident's/non-physician practitioner's note, except where noted  All available labs and Radiology studies were reviewed  I was present for key portions of any procedure(s) performed by the resident/non-physician practitioner and I was immediately available to provide assistance  At this point I agree with the current assessment done in the Emergency Department  I have conducted an independent evaluation of this patient a history and physical is as follows:     13-year-old male history of asthma  Shortness of breath over the past couple days  Worsening  Worsening with exertion no fever no chills no c butyrin production but does have a dry cough  No chest pain  On exam he looks well he has normal vital signs he does have some end expiratory wheezing with prolonged expiratory phase  Plan albuterol steroids for asthma exacerbation doubt pneumonia doubt COVID flu RSV or viral infection, doubt acute coronary syndrome    This EKG was interpreted by me   The EKG demonstrates Normal sinus rhythm, normal intervals and axis, normal QRS, non specific acute ST-T changes    ED Course         Critical Care Time  Procedures

## 2023-02-10 NOTE — TELEPHONE ENCOUNTER
Spoke with pt last and he told me that he was advised by on-call to continue, I told pt that if things got worse then he should go to ER

## 2023-02-20 ENCOUNTER — HOSPITAL ENCOUNTER (EMERGENCY)
Facility: HOSPITAL | Age: 48
Discharge: HOME | End: 2023-02-20
Attending: EMERGENCY MEDICINE

## 2023-02-20 VITALS
DIASTOLIC BLOOD PRESSURE: 89 MMHG | TEMPERATURE: 97.9 F | HEART RATE: 91 BPM | SYSTOLIC BLOOD PRESSURE: 151 MMHG | OXYGEN SATURATION: 100 % | RESPIRATION RATE: 18 BRPM

## 2023-02-20 DIAGNOSIS — J45.901 ASTHMA EXACERBATION: Primary | ICD-10-CM

## 2023-02-20 RX ORDER — IPRATROPIUM BROMIDE AND ALBUTEROL SULFATE 2.5; .5 MG/3ML; MG/3ML
3 SOLUTION RESPIRATORY (INHALATION) ONCE
Status: COMPLETED | OUTPATIENT
Start: 2023-02-20 | End: 2023-02-20

## 2023-02-20 RX ORDER — IPRATROPIUM BROMIDE AND ALBUTEROL SULFATE 2.5; .5 MG/3ML; MG/3ML
3 SOLUTION RESPIRATORY (INHALATION)
Status: DISCONTINUED | OUTPATIENT
Start: 2023-02-20 | End: 2023-02-20

## 2023-02-20 RX ADMIN — DEXAMETHASONE 10 MG: 2 TABLET ORAL at 06:40

## 2023-02-20 RX ADMIN — IPRATROPIUM BROMIDE AND ALBUTEROL SULFATE 3 ML: .5; 3 SOLUTION RESPIRATORY (INHALATION) at 06:40

## 2023-02-20 NOTE — ED PROVIDER NOTES
History  Chief Complaint   Patient presents with   • Asthma     Pt c/o asthma attack  No other complaints  Seen 2 weeks ago for same     HPI  Patient is a 44-year-old male presenting with asthma exacerbation  Patient was seen 2 weeks ago for similar symptoms  States that he used his nebulizer at midnight and used his inhaler one hour prior to arrival however still felt wheezy and also had persistent dry cough so decided to come to the ED for symptom relief  Patient denies any fever, chills, n/v, chest pain, abdominal pain, diarrhea, weakness numbness, sick contacts, and no recent travels  Prior to Admission Medications   Prescriptions Last Dose Informant Patient Reported? Taking?    albuterol (2 5 mg/3 mL) 0 083 % nebulizer solution   No No   Sig: Take 3 mL (2 5 mg total) by nebulization every 6 (six) hours as needed for wheezing or shortness of breath   albuterol (VENTOLIN HFA) 90 mcg/act inhaler   No No   Sig: Use 2 puffs every 4-6 hr  PRN for chest tightness or wheezing   benazepril (LOTENSIN) 20 mg tablet   No No   Sig: TAKE 1 TABLET BY MOUTH EVERY DAY   hydrochlorothiazide (HYDRODIURIL) 12 5 mg tablet   No No   Sig: TAKE 1 TABLET BY MOUTH EVERY DAY   multivitamin (THERAGRAN) TABS   Yes No   Sig: Take 1 tablet by mouth daily      Facility-Administered Medications: None       Past Medical History:   Diagnosis Date   • 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NAKUL M[facility]    • 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[facility] )    • Allergic rhinitis    • Allergic rhinitis 3/8/2014   • Asthma    • COVID-19 virus infection 12/11/2020   • Essential hypertension, benign    • Leukemia ([facility] )    • Obesity    • Personal history of other diseases of respiratory system        Past Surgical History:   Procedure Laterality Date   • BONE MARROW BIOPSY W/ ASPIRATION N/A    • PORTACATH PLACEMENT Right    • WISDOM TOOTH EXTRACTION         Family History   Problem Relation Age of Onset   • Hypertension Father      I have reviewed and agree with the history as documented  E-Cigarette/Vaping   • E-Cigarette Use Never User      E-Cigarette/Vaping Substances     Social History     Tobacco Use   • Smoking status: Never   • Smokeless tobacco: Never   Vaping Use   • Vaping Use: Never used   Substance Use Topics   • Alcohol use: No   • Drug use: No        Review of Systems   Constitutional: Negative for chills, diaphoresis, fever and unexpected weight change  HENT: Negative for ear pain and sore throat  Eyes: Negative for visual disturbance  Respiratory: Positive for wheezing  Negative for cough, chest tightness and shortness of breath  Cardiovascular: Negative for chest pain and leg swelling  Gastrointestinal: Negative for abdominal distention, abdominal pain, constipation, diarrhea, nausea and vomiting  Endocrine: Negative  Genitourinary: Negative for difficulty urinating and dysuria  Musculoskeletal: Negative  Skin: Negative  Allergic/Immunologic: Negative  Neurological: Negative  Hematological: Negative  Psychiatric/Behavioral: Negative  All other systems reviewed and are negative  Physical Exam  ED Triage Vitals   Temperature Pulse Respirations Blood Pressure SpO2   02/20/23 0501 02/20/23 0501 02/20/23 0501 02/20/23 0504 02/20/23 0501   97 9 °F (36 6 °C) 91 18 151/89 100 %      Temp Source Heart Rate Source Patient Position - Orthostatic VS BP Location FiO2 (%)   02/20/23 0501 02/20/23 0501 -- -- --   Oral Monitor         Pain Score       --                    Orthostatic Vital Signs  Vitals:    02/20/23 0501 02/20/23 0504   BP:  151/89   Pulse: 91        Physical Exam  Vitals and nursing note reviewed  Constitutional:       General: He is not in acute distress  Appearance: Normal appearance  He is not ill-appearing  HENT:      Head: Normocephalic and atraumatic        Right Ear: External ear normal       Left Ear: External ear normal       Nose: Nose normal       Mouth/Throat:      Mouth: Mucous membranes are moist       Pharynx: Oropharynx is clear  Eyes:      General: No scleral icterus  Right eye: No discharge  Left eye: No discharge  Extraocular Movements: Extraocular movements intact  Conjunctiva/sclera: Conjunctivae normal       Pupils: Pupils are equal, round, and reactive to light  Cardiovascular:      Rate and Rhythm: Normal rate and regular rhythm  Pulses: Normal pulses  Heart sounds: Normal heart sounds  Pulmonary:      Effort: Pulmonary effort is normal       Breath sounds: Wheezing (Scant wheezing noted) present  Abdominal:      General: Abdomen is flat  Bowel sounds are normal  There is no distension  Palpations: Abdomen is soft  Tenderness: There is no abdominal tenderness  There is no guarding or rebound  Musculoskeletal:         General: Normal range of motion  Cervical back: Normal range of motion and neck supple  Skin:     General: Skin is warm and dry  Capillary Refill: Capillary refill takes less than 2 seconds  Neurological:      General: No focal deficit present  Mental Status: He is alert and oriented to person, place, and time  Mental status is at baseline  Psychiatric:         Mood and Affect: Mood normal          Behavior: Behavior normal          Thought Content:  Thought content normal          Judgment: Judgment normal          ED Medications  Medications   dexamethasone (DECADRON) tablet 10 mg (10 mg Oral Given 2/20/23 0640)   ipratropium-albuterol (DUO-NEB) 0 5-2 5 mg/3 mL inhalation solution 3 mL (3 mL Nebulization Given 2/20/23 0640)       Diagnostic Studies  Results Reviewed     None                 No orders to display         Procedures  Procedures      ED Course                                       Medical Decision Making  49year old male with asthma exacerbation   No respiratory distress   Mild wheezing   Will give breathing treatment and steroids   Plan for discharge after treatment       Asthma exacerbation: acute illness or injury  Risk  Prescription drug management  Disposition  Final diagnoses:   Asthma exacerbation     Time reflects when diagnosis was documented in both MDM as applicable and the Disposition within this note     Time User Action Codes Description Comment    2/20/2023  5:57 AM Parker Orellana [K14 [id number]] Asthma exacerbation       ED Disposition     ED Disposition   Discharge    Condition   Stable    Date/Time   Mon Feb 20, 2023  5:57 AM    Comment   Sulema Main discharge to home/self care  Follow-up Information     Follow up With Specialties Details Why Contact Info Additional [address] [facility], [address] [place] [address] Internal Medicine Schedule an appointment as soon as possible for a visit   [place] [phone]       [address] [place] [address] Emergency Department Emergency Medicine Go to  If symptoms worsen [address] [place] [address] Emergency Department, [address] [place] [address], [address]          Discharge Medication List as of 2/20/2023  5:58 AM      CONTINUE these medications which have NOT CHANGED    Details   albuterol (2 5 mg/3 mL) 0 083 % nebulizer solution Take 3 mL (2 5 mg total) by nebulization every 6 (six) hours as needed for wheezing or shortness of breath, Starting Mon 2/6/2023, Normal      albuterol (VENTOLIN HFA) 90 mcg/act inhaler Use 2 puffs every 4-6 hr  PRN for chest tightness or wheezing, Normal      benazepril (LOTENSIN) 20 mg tablet TAKE 1 TABLET BY MOUTH EVERY DAY, Normal      hydrochlorothiazide (HYDRODIURIL) 12 5 mg tablet TAKE 1 TABLET BY MOUTH EVERY DAY, Normal      multivitamin (THERAGRAN) TABS Take 1 tablet by mouth daily, Historical Med           No discharge procedures on file  PDMP Review     None           ED Provider  Attending physically available and evaluated Sulema Main   JUSTINE managed the patient along with the ED Attending      Electronically Signed by         Dickson Padilla MD  02/20/23 5639

## 2023-02-20 NOTE — Clinical Note
Mikeerika Cushing was seen and treated in our emergency department on 2/20/2023  Diagnosis: Asthma    Ivy Nolen  may return to work on return date  He may return on this date: 02/19/2023         If you have any questions or concerns, please don't hesitate to call        Oskar Nuno MD    ______________________________           _______________          _______________  [facility] Representative                              Date                                Time

## 2023-02-20 NOTE — ED ATTENDING ATTESTATION
2/20/2023  I, Christella Skiff, MD, saw and evaluated the patient  I have discussed the patient with the resident/non-physician practitioner and agree with the resident's/non-physician practitioner's findings, Plan of Care, and MDM as documented in the resident's/non-physician practitioner's note, except where noted  All available labs and Radiology studies were reviewed  I was present for key portions of any procedure(s) performed by the resident/non-physician practitioner and I was immediately available to provide assistance  At this point I agree with the current assessment done in the Emergency Department  I have conducted an independent evaluation of this patient a history and physical is as follows:    ED Course  ED Course as of 02/20/23 0600   Mon Feb 20, 2023   7671 Per resident h&p 29 YO M presents for asthma flare over the last week worse overnight; used albuterol at home O: lungs diffuse wheezing; I/P duoneb; corticosteroids     Emergency Department Note- Tavo Manzanares 49 y o  male MRN: [id number]    Unit/Bed#: [address] Encounter: [id number]    Tavo Manzanares is a 49 y o  male who presents with   Chief Complaint   Patient presents with   • Asthma     Pt c/o asthma attack  No other complaints  Seen 2 weeks ago for same         History of Present Illness   HPI:  Tavo Manzanares is a 49 y o  male who presents for evaluation of:  Difficulties breathing with wheezing intermittently over the last week  The patient has a history of adult onset asthma over the last several years  He noted increased dyspnea and wheezing tonight prompting the visit to the ED  The patient attempted to treat himself at home with an albuterol MDI treatment without much relief of his symptoms  He denies any associated fevers, cough productive of sputum, and hemoptysis  Review of Systems   Constitutional: Negative for fatigue and fever  HENT: Negative for congestion and sore throat      Respiratory: Positive for shortness of breath and wheezing  Negative for cough  Cardiovascular: Negative for chest pain and palpitations  Gastrointestinal: Negative for abdominal pain and nausea  Genitourinary: Negative for flank pain and frequency  Neurological: Negative for light-headedness and headaches  Psychiatric/Behavioral: Negative for dysphoric mood and hallucinations  All other systems reviewed and are negative  Historical Information   Past Medical History:   Diagnosis Date   • 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NAKUL M3) ([place] [address] )    • 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[place] [address] )    • Allergic rhinitis    • Allergic rhinitis 3/8/2014   • Asthma    • COVID-19 virus infection 12/11/2020   • Essential hypertension, benign    • Leukemia ([place] [address] )    • Obesity    • Personal history of other diseases of respiratory system      Past Surgical History:   Procedure Laterality Date   • BONE MARROW BIOPSY W/ ASPIRATION N/A    • PORTACATH PLACEMENT Right    • WISDOM TOOTH EXTRACTION       Social History   Social History     Substance and Sexual Activity   Alcohol Use No     Social History     Substance and Sexual Activity   Drug Use No     Social History     Tobacco Use   Smoking Status Never   Smokeless Tobacco Never     Family History:   Family History   Problem Relation Age of Onset   • Hypertension Father        Meds/Allergies   PTA meds:   Prior to Admission Medications   Prescriptions Last Dose Informant Patient Reported? Taking?    albuterol (2 5 mg/3 mL) 0 083 % nebulizer solution   No No   Sig: Take 3 mL (2 5 mg total) by nebulization every 6 (six) hours as needed for wheezing or shortness of breath   albuterol (VENTOLIN HFA) 90 mcg/act inhaler   No No   Sig: Use 2 puffs every 4-6 hr  PRN for chest tightness or wheezing   benazepril (LOTENSIN) 20 mg tablet   No No   Sig: TAKE 1 TABLET BY MOUTH EVERY DAY   hydrochlorothiazide (HYDRODIURIL) 12 5 mg tablet   No No   Sig: TAKE 1 TABLET BY MOUTH EVERY DAY   multivitamin (THERAGRAN) TABS   Yes No   Sig: Take 1 tablet by mouth daily      Facility-Administered Medications: None     Allergies   Allergen Reactions   • Penicillins Hives       Objective   First Vitals:   Blood Pressure: 151/89 (02/20/23 0504)  Pulse: 91 (02/20/23 0501)  Temperature: 97 9 °F (36 6 °C) (02/20/23 0501)  Temp Source: Oral (02/20/23 0501)  Respirations: 18 (02/20/23 0501)  SpO2: 100 % (02/20/23 0501)    Current Vitals:   Blood Pressure: 151/89 (02/20/23 0504)  Pulse: 91 (02/20/23 0501)  Temperature: 97 9 °F (36 6 °C) (02/20/23 0501)  Temp Source: Oral (02/20/23 0501)  Respirations: 18 (02/20/23 0501)  SpO2: 100 % (02/20/23 0501)    No intake or output data in the 24 hours ending 02/20/23 0600    Invasive Devices     None                 Physical Exam  Vitals and nursing note reviewed  Constitutional:       General: He is not in acute distress  Appearance: Normal appearance  He is well-developed  HENT:      Head: Normocephalic and atraumatic  Right Ear: External ear normal       Left Ear: External ear normal       Nose: Nose normal       Mouth/Throat:      Pharynx: No oropharyngeal exudate  Eyes:      Conjunctiva/sclera: Conjunctivae normal       Pupils: Pupils are equal, round, and reactive to light  Cardiovascular:      Rate and Rhythm: Normal rate and regular rhythm  Pulmonary:      Effort: Pulmonary effort is normal  No respiratory distress  Abdominal:      General: Abdomen is flat  There is no distension  Palpations: Abdomen is soft  Musculoskeletal:         General: No deformity  Normal range of motion  Cervical back: Normal range of motion and neck supple  Skin:     General: Skin is warm and dry  Capillary Refill: Capillary refill takes less than 2 seconds  Neurological:      General: No focal deficit present  Mental Status: He is alert and oriented to person, place, and time  Mental status is at baseline        Coordination: Coordination normal    Psychiatric:         Mood and Affect: Mood normal  Behavior: Behavior normal          Thought Content: Thought content normal          Judgment: Judgment normal            Medical Decision Makin  Dyspnea and wheezing: Albuterol/ipratropium nebulization therapy; corticosteroids orally; room air pulse ox is 100% at the bedside; lungs are currently clear to auscultation  Patient will follow-up with his primary care provider as an outpatient  No results found for this or any previous visit (from the past 36 hour(s))  No orders to display         Portions of the record may have been created with voice recognition software  Occasional wrong word or "sound a like" substitutions may have occurred due to the inherent limitations of voice recognition software  Read the chart carefully and recognize, using context, where substitutions have occurred            Critical Care Time  Procedures

## 2023-04-25 ENCOUNTER — NURSE TRIAGE (OUTPATIENT)
Dept: OTHER | Facility: OTHER | Age: 48
End: 2023-04-25

## 2023-04-25 RX ORDER — SODIUM CHLORIDE 9 MG/ML
125 INJECTION, SOLUTION INTRAVENOUS CONTINUOUS
Status: CANCELLED | OUTPATIENT
Start: 2023-04-25

## 2023-04-25 NOTE — TELEPHONE ENCOUNTER
"Instructions reviewed with patient  Reason for Disposition  • Bowel prep for colonoscopy, questions about    Answer Assessment - Initial Assessment Questions  1  DATE/TIME: \"When did you have your colonoscopy? \"       4/27/23  2  MAIN CONCERN: Gladys Storm is your main concern right now? \" \"What questions do you have? \"     Prep instruction    Protocols used: COLONOSCOPY SYMPTOMS AND QUESTIONS-ADULT-AH    "

## 2023-04-25 NOTE — TELEPHONE ENCOUNTER
"Regarding: prep instruction colonoscopy  ----- Message from Sandra Ojeda sent at 4/25/2023  1:14 PM EDT -----  \"I have a colonoscopy scheduled for 04/27/2023 and I need prep instruction\"    "

## 2023-04-27 ENCOUNTER — ANESTHESIA EVENT (OUTPATIENT)
Dept: GASTROENTEROLOGY | Facility: HOSPITAL | Age: 48
End: 2023-04-27

## 2023-04-27 ENCOUNTER — HOSPITAL ENCOUNTER (OUTPATIENT)
Dept: GASTROENTEROLOGY | Facility: HOSPITAL | Age: 48
Setting detail: OUTPATIENT SURGERY
Discharge: HOME | End: 2023-04-27
Attending: STUDENT IN AN ORGANIZED HEALTH CARE EDUCATION/TRAINING PROGRAM | Admitting: STUDENT IN AN ORGANIZED HEALTH CARE EDUCATION/TRAINING PROGRAM

## 2023-04-27 ENCOUNTER — ANESTHESIA (OUTPATIENT)
Dept: GASTROENTEROLOGY | Facility: HOSPITAL | Age: 48
End: 2023-04-27

## 2023-04-27 VITALS
DIASTOLIC BLOOD PRESSURE: 94 MMHG | OXYGEN SATURATION: 97 % | HEART RATE: 79 BPM | RESPIRATION RATE: 20 BRPM | SYSTOLIC BLOOD PRESSURE: 146 MMHG

## 2023-04-27 DIAGNOSIS — Z12.11 SCREENING FOR COLON CANCER: ICD-10-CM

## 2023-04-27 RX ORDER — FLUTICASONE FUROATE AND VILANTEROL 200; 25 UG/1; UG/1
1 POWDER RESPIRATORY (INHALATION) DAILY
COMMUNITY

## 2023-04-27 RX ORDER — PROPOFOL 10 MG/ML
INJECTION, EMULSION INTRAVENOUS AS NEEDED
Status: DISCONTINUED | OUTPATIENT
Start: 2023-04-27 | End: 2023-04-27

## 2023-04-27 RX ORDER — SODIUM CHLORIDE 9 MG/ML
125 INJECTION, SOLUTION INTRAVENOUS CONTINUOUS
Status: DISCONTINUED | OUTPATIENT
Start: 2023-04-27 | End: 2023-05-01 | Stop reason: HOSPADM

## 2023-04-27 RX ADMIN — PROPOFOL 50 MG: 10 INJECTION, EMULSION INTRAVENOUS at 14:15

## 2023-04-27 RX ADMIN — PROPOFOL 50 MG: 10 INJECTION, EMULSION INTRAVENOUS at 14:13

## 2023-04-27 RX ADMIN — PROPOFOL 50 MG: 10 INJECTION, EMULSION INTRAVENOUS at 14:06

## 2023-04-27 RX ADMIN — PROPOFOL 50 MG: 10 INJECTION, EMULSION INTRAVENOUS at 14:18

## 2023-04-27 RX ADMIN — PROPOFOL 50 MG: 10 INJECTION, EMULSION INTRAVENOUS at 14:02

## 2023-04-27 RX ADMIN — Medication 40 MG: at 14:05

## 2023-04-27 RX ADMIN — PROPOFOL 50 MG: 10 INJECTION, EMULSION INTRAVENOUS at 14:10

## 2023-04-27 RX ADMIN — PROPOFOL 150 MG: 10 INJECTION, EMULSION INTRAVENOUS at 13:59

## 2023-04-27 RX ADMIN — PROPOFOL 50 MG: 10 INJECTION, EMULSION INTRAVENOUS at 14:20

## 2023-04-27 RX ADMIN — SODIUM CHLORIDE: 0.9 INJECTION, SOLUTION INTRAVENOUS at 13:39

## 2023-04-27 NOTE — H&P
History and Physical - [facility]  Todd Orosco 49 y o  male MRN: [id number]          HPI: Todd Orosco is a 49y o  year old male who presents for open access screening colonoscopy  No family history of colon cancer  No prior colonoscopy  REVIEW OF SYSTEMS: Per the HPI, and otherwise unremarkable      Historical Information   Past Medical History:   Diagnosis Date   • 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NAKUL M3) ([facility] )    • 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[facility] )    • Allergic rhinitis    • Allergic rhinitis 3/8/2014   • Asthma    • COVID-19 virus infection 12/11/2020   • Essential hypertension, benign    • Leukemia ([facility] )    • Obesity    • Personal history of other diseases of respiratory system      Past Surgical History:   Procedure Laterality Date   • BONE MARROW BIOPSY W/ ASPIRATION N/A    • PORTACATH PLACEMENT Right    • WISDOM TOOTH EXTRACTION       Social History   Social History     Substance and Sexual Activity   Alcohol Use No     Social History     Substance and Sexual Activity   Drug Use No     Social History     Tobacco Use   Smoking Status Never   Smokeless Tobacco Never     Family History   Problem Relation Age of Onset   • Hypertension Father        Meds/Allergies       Current Outpatient Medications:   •  benazepril (LOTENSIN) 20 mg tablet  •  fluticasone-vilanterol (Breo Ellipta) 200-25 mcg/actuation inhaler  •  hydrochlorothiazide (HYDRODIURIL) 12 5 mg tablet  •  multivitamin (THERAGRAN) TABS  •  albuterol (2 5 mg/3 mL) 0 083 % nebulizer solution  •  albuterol (VENTOLIN HFA) 90 mcg/act inhaler  •  bisacodyl (DULCOLAX) 5 mg EC tablet  •  polyethylene glycol (GOLYTELY) 4000 mL solution    Current Facility-Administered Medications:   •  sodium chloride 0 9 % infusion, 125 mL/hr, Intravenous, Continuous    Allergies   Allergen Reactions   • Penicillins Hives       Objective     /85   Pulse 72   Resp 15   SpO2 94%       PHYSICAL EXAM    GEN: NAD  CARDIO: RRR  PULM: CTA bilaterally  ABD: soft, non-tender, non-distended  EXT: no lower extremity edema  NEURO: AAOx3      ASSESSMENT/PLAN:  49y o  year old male here for colonoscopy; he is stable and optimized for his procedure

## 2023-04-27 NOTE — ANESTHESIA POSTPROCEDURE EVALUATION
Post-Op Assessment Note    CV Status:  Stable    Pain management: adequate     Mental Status:  Alert and awake   Hydration Status:  Euvolemic   PONV Controlled:  Controlled   Airway Patency:  Patent      Post Op Vitals Reviewed: Yes      Staff: Anesthesiologist, CRNA         No notable events documented      BP      Temp      Pulse     Resp      SpO2      /94   Pulse 79   Resp 20   SpO2 97%

## 2023-04-27 NOTE — ANESTHESIA PREPROCEDURE EVALUATION
Procedure:  COLONOSCOPY    Relevant Problems   CARDIO   (+) Benign essential hypertension      PULMONARY   (+) Mild intermittent asthma without complication   (+) GWENDOLYN (obstructive sleep apnea)      Other   (+) Acute promyelocytic leukemia in remission (HCC)   (+) Chronic fatigue        Physical Exam    Airway    Mallampati score: II  TM Distance: >3 FB  Neck ROM: full     Dental   No notable dental hx     Cardiovascular  Rhythm: regular, Rate: normal, Cardiovascular exam normal    Pulmonary  Pulmonary exam normal Breath sounds clear to auscultation,     Other Findings        Anesthesia Plan  ASA Score- 2     Anesthesia Type- IV sedation with anesthesia with ASA Monitors  Additional Monitors:   Airway Plan:     Comment: GA prn  Used Breo today  Plan Factors-    Chart reviewed  Patient summary reviewed  Patient is not a current smoker  Patient not instructed to abstain from smoking on day of procedure  Patient did not smoke on day of surgery  There is medical exclusion for perioperative obstructive sleep apnea risk education  Induction- intravenous  Postoperative Plan-     Informed Consent- Anesthetic plan and risks discussed with patient and spouse Beny Durant  I personally reviewed this patient with the CRNA  Discussed and agreed on the Anesthesia Plan with the CRNA  Zeferino Longoria

## 2023-07-20 DIAGNOSIS — I10 BENIGN ESSENTIAL HYPERTENSION: ICD-10-CM

## 2023-07-20 RX ORDER — HYDROCHLOROTHIAZIDE 12.5 MG/1
TABLET ORAL
Qty: 90 TABLET | Refills: 1 | Status: SHIPPED | OUTPATIENT
Start: 2023-07-20

## 2023-07-20 RX ORDER — BENAZEPRIL HYDROCHLORIDE 20 MG/1
TABLET ORAL
Qty: 90 TABLET | Refills: 1 | Status: SHIPPED | OUTPATIENT
Start: 2023-07-20

## 2023-07-31 ENCOUNTER — OFFICE VISIT (OUTPATIENT)
Dept: FAMILY MEDICINE CLINIC | Facility: CLINIC | Age: 48
End: 2023-07-31
Payer: COMMERCIAL

## 2023-07-31 VITALS
TEMPERATURE: 97.4 F | HEIGHT: 69 IN | HEART RATE: 86 BPM | SYSTOLIC BLOOD PRESSURE: 122 MMHG | BODY MASS INDEX: 31.99 KG/M2 | WEIGHT: 216 LBS | OXYGEN SATURATION: 98 % | DIASTOLIC BLOOD PRESSURE: 88 MMHG | RESPIRATION RATE: 18 BRPM

## 2023-07-31 DIAGNOSIS — G47.33 OSA (OBSTRUCTIVE SLEEP APNEA): ICD-10-CM

## 2023-07-31 DIAGNOSIS — C92.41 ACUTE PROMYELOCYTIC LEUKEMIA IN REMISSION (HCC): ICD-10-CM

## 2023-07-31 DIAGNOSIS — J45.20 MILD INTERMITTENT ASTHMA WITHOUT COMPLICATION: ICD-10-CM

## 2023-07-31 DIAGNOSIS — I10 BENIGN ESSENTIAL HYPERTENSION: ICD-10-CM

## 2023-07-31 DIAGNOSIS — Z00.00 ANNUAL PHYSICAL EXAM: Primary | ICD-10-CM

## 2023-07-31 PROCEDURE — 99396 PREV VISIT EST AGE 40-64: CPT | Performed by: NURSE PRACTITIONER

## 2023-07-31 RX ORDER — FLUTICASONE FUROATE AND VILANTEROL 200; 25 UG/1; UG/1
1 POWDER RESPIRATORY (INHALATION) DAILY
Qty: 60 BLISTER | Refills: 2 | Status: SHIPPED | OUTPATIENT
Start: 2023-07-31

## 2023-07-31 NOTE — PROGRESS NOTES
[address] [facility] [facility]    NAME: Lela Mccall  AGE: 51 y.o. SEX: male  : 1975     DATE: 2023     Assessment and Plan:     Problem List Items Addressed This Visit        Respiratory    Mild intermittent asthma without complication        The patient continues on his daily [facility]. He does have a rescue inhaler. He has not had to use it. He does follow with an allergist.         Relevant Medications    fluticasone-vilanterol (Breo Ellipta) 200-25 mcg/actuation inhaler    GWENDOLYN (obstructive sleep apnea)     Patient continues to use his CPAP almost every night. Weight loss recommended. Cardiovascular and Mediastinum    Benign essential hypertension     Blood pressure in the office today is 122/88. He continues on his Lotensin and hydrochlorothiazide. Continue low-salt diet recommended. Weight loss recommended. Other    RESOLVED: 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NAKUL M3) ([address])   Other Visit Diagnoses     Annual physical exam    -  Primary          Immunizations and preventive care screenings were discussed with patient today. Appropriate education was printed on patient's after visit summary. Counseling:  Alcohol/drug use: discussed moderation in alcohol intake, the recommendations for healthy alcohol use, and avoidance of illicit drug use. Dental Health: discussed importance of regular tooth brushing, flossing, and dental visits. Injury prevention: discussed safety/seat belts, safety helmets, smoke detectors, carbon dioxide detectors, and smoking near bedding or upholstery. Sexual health: discussed sexually transmitted diseases, partner selection, use of condoms, avoidance of unintended pregnancy, and contraceptive alternatives. Exercise: the importance of regular exercise/physical activity was discussed. Recommend exercise 3-5 times per week for at least 30 minutes.        Depression Screening and Follow-up Plan: Patient was screened for depression during today's encounter. They screened negative with a PHQ-2 score of 0. Return in about 6 months (around 1/31/2024) for Tamra Sorto, Office Visit. Chief Complaint:     Chief Complaint   Patient presents with   • Physical Exam     annual      History of Present Illness:     Adult Annual Physical   Patient here for a comprehensive physical exam. The patient reports no problems. Diet and Physical Activity  Diet/Nutrition: well balanced diet, limited junk food, low carb diet and limited fruits/vegetables. Exercise: moderate cardiovascular exercise, 1-2 times a week on average and 30-60 minutes on average. Depression Screening  PHQ-2/9 Depression Screening    Little interest or pleasure in doing things: 0 - not at all  Feeling down, depressed, or hopeless: 0 - not at all  PHQ-2 Score: 0  PHQ-2 Interpretation: Negative depression screen       General Health  Sleep: sleeps well and gets 4-6 hours of sleep on average. Hearing: normal - bilateral.  Vision: most recent eye exam >1 year ago and wears glasses. Dental: regular dental visits and brushes teeth twice daily.  Health  Symptoms include: none     Review of Systems:     Review of Systems   Constitutional: Negative. Negative for fatigue. HENT: Negative. Negative for congestion, postnasal drip, rhinorrhea and trouble swallowing. Eyes: Negative. Negative for visual disturbance. Respiratory: Negative. Negative for choking and shortness of breath. Cardiovascular: Negative. Negative for chest pain. Gastrointestinal: Negative. Endocrine: Negative. Genitourinary: Negative. Musculoskeletal: Negative. Negative for arthralgias, back pain, myalgias and neck pain. Skin: Negative. Neurological: Negative for dizziness and headaches. Psychiatric/Behavioral: Negative.        Past Medical History:     Past Medical History:   Diagnosis Date   • 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NAKUL M3) ([facility])    • 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[facility])    • Allergic rhinitis    • Allergic rhinitis 3/8/2014   • Asthma    • COVID-19 virus infection 12/11/2020   • Essential hypertension, benign    • Leukemia ([facility])    • Obesity    • Personal history of other diseases of respiratory system       Past Surgical History:     Past Surgical History:   Procedure Laterality Date   • BONE MARROW BIOPSY W/ ASPIRATION N/A    • PORTACATH PLACEMENT Right    • WISDOM TOOTH EXTRACTION        Family History:     Family History   Problem Relation Age of Onset   • Hypertension Father       Social History:     Social History     Socioeconomic History   • Marital status: /Civil Union     Spouse name: None   • Number of children: None   • Years of education: None   • Highest education level: None   Occupational History     Employer: [employer]   Tobacco Use   • Smoking status: Never   • Smokeless tobacco: Never   Vaping Use   • Vaping Use: Never used   Substance and Sexual Activity   • Alcohol use: No   • Drug use: No   • Sexual activity: None   Other Topics Concern   • None   Social History Narrative   • None     Social Determinants of Health     Financial Resource Strain: Not on file   Food Insecurity: Not on file   Transportation Needs: Not on file   Physical Activity: Not on file   Stress: Not on file   Social Connections: Not on file   Intimate Partner Violence: Not on file   Housing Stability: Not on file      Current Medications:     Current Outpatient Medications   Medication Sig Dispense Refill   • albuterol (2.5 mg/3 mL) 0.083 % nebulizer solution Take 3 mL (2.5 mg total) by nebulization every 6 (six) hours as needed for wheezing or shortness of breath 75 mL 0   • albuterol (VENTOLIN HFA) 90 mcg/act inhaler Use 2 puffs every 4-6 hr. PRN for chest tightness or wheezing 1 Inhaler 3   • benazepril (LOTENSIN) 20 mg tablet TAKE 1 TABLET BY MOUTH EVERY DAY 90 tablet 1   • fluticasone-vilanterol (Breo Ellipta) 200-25 mcg/actuation inhaler Inhale 1 puff daily Rinse mouth after use. 60 blister 2   • hydrochlorothiazide (HYDRODIURIL) 12.5 mg tablet TAKE 1 TABLET BY MOUTH EVERY DAY 90 tablet 1   • multivitamin (THERAGRAN) TABS Take 1 tablet by mouth daily       No current facility-administered medications for this visit. Allergies: Allergies   Allergen Reactions   • Penicillins Hives      Physical Exam:     /88   Pulse 86   Temp (!) 97.4 °F (36.3 °C) (Temporal)   Resp 18   Ht 5' 8.5" (1.74 m)   Wt 98 kg (216 lb)   SpO2 98%   BMI 32.36 kg/m²     Physical Exam  Vitals and nursing note reviewed. Constitutional:       Appearance: Normal appearance. He is well-developed. He is obese. HENT:      Head: Normocephalic and atraumatic. Right Ear: Tympanic membrane, ear canal and external ear normal. There is no impacted cerumen. Left Ear: Tympanic membrane, ear canal and external ear normal. There is no impacted cerumen. Nose: Nose normal.      Mouth/Throat:      Mouth: Mucous membranes are moist.      Pharynx: Oropharynx is clear. Eyes:      Conjunctiva/sclera: Conjunctivae normal.   Cardiovascular:      Rate and Rhythm: Normal rate and regular rhythm. Pulses: Normal pulses. Heart sounds: Normal heart sounds. No murmur heard. Pulmonary:      Effort: Pulmonary effort is normal. No respiratory distress. Breath sounds: Normal breath sounds. Abdominal:      General: Bowel sounds are normal. There is no distension. Palpations: Abdomen is soft. There is no mass. Tenderness: There is no abdominal tenderness. There is no guarding or rebound. Hernia: No hernia is present. Musculoskeletal:         General: Normal range of motion. Cervical back: Normal range of motion and neck supple. Skin:     General: Skin is warm and dry. Neurological:      General: No focal deficit present.       Mental Status: He is alert and oriented to person, place, and time. Mental status is at baseline. Psychiatric:         Mood and Affect: Mood normal.         Behavior: Behavior normal.         Thought Content:  Thought content normal.         Judgment: Judgment normal.          Sameer Aleman, [address]

## 2023-08-01 NOTE — ASSESSMENT & PLAN NOTE
The patient continues on his daily Michelle Pinto. He does have a rescue inhaler. He has not had to use it.   He does follow with an allergist.

## 2023-08-01 NOTE — ASSESSMENT & PLAN NOTE
Blood pressure in the office today is 122/88. He continues on his Lotensin and hydrochlorothiazide. Continue low-salt diet recommended. Weight loss recommended.

## 2023-08-19 ENCOUNTER — APPOINTMENT (OUTPATIENT)
Dept: LAB | Facility: CLINIC | Age: 48
End: 2023-08-19
Payer: COMMERCIAL

## 2023-08-19 DIAGNOSIS — E55.9 VITAMIN D DEFICIENCY: ICD-10-CM

## 2023-08-19 DIAGNOSIS — Z11.4 SCREENING FOR HIV (HUMAN IMMUNODEFICIENCY VIRUS): ICD-10-CM

## 2023-08-19 DIAGNOSIS — Z11.59 NEED FOR HEPATITIS C SCREENING TEST: ICD-10-CM

## 2023-08-19 DIAGNOSIS — I10 BENIGN ESSENTIAL HYPERTENSION: ICD-10-CM

## 2023-08-19 DIAGNOSIS — E66.09 CLASS 1 OBESITY DUE TO EXCESS CALORIES WITHOUT SERIOUS COMORBIDITY WITH BODY MASS INDEX (BMI) OF 31.0 TO 31.9 IN ADULT: ICD-10-CM

## 2023-08-19 DIAGNOSIS — E78.5 DYSLIPIDEMIA: ICD-10-CM

## 2023-08-19 LAB
25(OH)D3 SERPL-MCNC: 25 NG/ML (ref 30–100)
ALBUMIN SERPL BCP-MCNC: 3.5 G/DL (ref 3.5–5)
ALP SERPL-CCNC: 72 U/L (ref 46–116)
ALT SERPL W P-5'-P-CCNC: 38 U/L (ref 12–78)
ANION GAP SERPL CALCULATED.3IONS-SCNC: 3 MMOL/L
AST SERPL W P-5'-P-CCNC: 18 U/L (ref 5–45)
BASOPHILS # BLD AUTO: 0.04 THOUSANDS/ÂΜL (ref 0–0.1)
BASOPHILS NFR BLD AUTO: 1 % (ref 0–1)
BILIRUB SERPL-MCNC: 0.68 MG/DL (ref 0.2–1)
BUN SERPL-MCNC: 17 MG/DL (ref 5–25)
CALCIUM SERPL-MCNC: 8.3 MG/DL (ref 8.3–10.1)
CHLORIDE SERPL-SCNC: 112 MMOL/L (ref 96–108)
CHOLEST SERPL-MCNC: 122 MG/DL
CO2 SERPL-SCNC: 27 MMOL/L (ref 21–32)
CREAT SERPL-MCNC: 0.96 MG/DL (ref 0.6–1.3)
EOSINOPHIL # BLD AUTO: 0.32 THOUSAND/ÂΜL (ref 0–0.61)
EOSINOPHIL NFR BLD AUTO: 6 % (ref 0–6)
ERYTHROCYTE [DISTWIDTH] IN BLOOD BY AUTOMATED COUNT: 12.1 % (ref 11.6–15.1)
GFR SERPL CREATININE-BSD FRML MDRD: 92 ML/MIN/1.73SQ M
GLUCOSE P FAST SERPL-MCNC: 102 MG/DL (ref 65–99)
HCT VFR BLD AUTO: 44.7 % (ref 36.5–49.3)
HCV AB SER QL: NORMAL
HDLC SERPL-MCNC: 32 MG/DL
HGB BLD-MCNC: 14.8 G/DL (ref 12–17)
HIV 1+2 AB+HIV1 P24 AG SERPL QL IA: NORMAL
HIV 2 AB SERPL QL IA: NORMAL
HIV1 AB SERPL QL IA: NORMAL
HIV1 P24 AG SERPL QL IA: NORMAL
IMM GRANULOCYTES # BLD AUTO: 0.02 THOUSAND/UL (ref 0–0.2)
IMM GRANULOCYTES NFR BLD AUTO: 0 % (ref 0–2)
LDLC SERPL CALC-MCNC: 74 MG/DL (ref 0–100)
LYMPHOCYTES # BLD AUTO: 1.27 THOUSANDS/ÂΜL (ref 0.6–4.47)
LYMPHOCYTES NFR BLD AUTO: 23 % (ref 14–44)
MCH RBC QN AUTO: 31.6 PG (ref 26.8–34.3)
MCHC RBC AUTO-ENTMCNC: 33.1 G/DL (ref 31.4–37.4)
MCV RBC AUTO: 96 FL (ref 82–98)
MONOCYTES # BLD AUTO: 0.46 THOUSAND/ÂΜL (ref 0.17–1.22)
MONOCYTES NFR BLD AUTO: 8 % (ref 4–12)
NEUTROPHILS # BLD AUTO: 3.51 THOUSANDS/ÂΜL (ref 1.85–7.62)
NEUTS SEG NFR BLD AUTO: 62 % (ref 43–75)
NRBC BLD AUTO-RTO: 0 /100 WBCS
PLATELET # BLD AUTO: 212 THOUSANDS/UL (ref 149–390)
PMV BLD AUTO: 11.6 FL (ref 8.9–12.7)
POTASSIUM SERPL-SCNC: 4 MMOL/L (ref 3.5–5.3)
PROT SERPL-MCNC: 6.9 G/DL (ref 6.4–8.4)
RBC # BLD AUTO: 4.68 MILLION/UL (ref 3.88–5.62)
SODIUM SERPL-SCNC: 142 MMOL/L (ref 135–147)
TRIGL SERPL-MCNC: 82 MG/DL
TSH SERPL DL<=0.05 MIU/L-ACNC: 2.42 UIU/ML (ref 0.45–4.5)
WBC # BLD AUTO: 5.62 THOUSAND/UL (ref 4.31–10.16)

## 2023-08-19 PROCEDURE — 84443 ASSAY THYROID STIM HORMONE: CPT

## 2023-08-19 PROCEDURE — 80053 COMPREHEN METABOLIC PANEL: CPT

## 2023-08-19 PROCEDURE — 82306 VITAMIN D 25 HYDROXY: CPT

## 2023-08-19 PROCEDURE — 86803 HEPATITIS C AB TEST: CPT

## 2023-08-19 PROCEDURE — 36415 COLL VENOUS BLD VENIPUNCTURE: CPT

## 2023-08-19 PROCEDURE — 80061 LIPID PANEL: CPT

## 2023-08-19 PROCEDURE — 87389 HIV-1 AG W/HIV-1&-2 AB AG IA: CPT

## 2023-08-19 PROCEDURE — 85025 COMPLETE CBC W/AUTO DIFF WBC: CPT

## 2023-10-16 ENCOUNTER — APPOINTMENT (OUTPATIENT)
Dept: LAB | Facility: CLINIC | Age: 48
End: 2023-10-16
Payer: COMMERCIAL

## 2023-10-16 DIAGNOSIS — C92.41 ACUTE PROMYELOCYTIC LEUKEMIA IN REMISSION (HCC): ICD-10-CM

## 2023-10-20 ENCOUNTER — TELEPHONE (OUTPATIENT)
Dept: HEMATOLOGY ONCOLOGY | Facility: CLINIC | Age: 48
End: 2023-10-20

## 2023-10-20 NOTE — TELEPHONE ENCOUNTER
Spoke with patient regarding labs prior to appointment on Monday. He states he will be going over the weekend.

## 2023-10-22 ENCOUNTER — APPOINTMENT (OUTPATIENT)
Dept: LAB | Facility: CLINIC | Age: 48
End: 2023-10-22
Payer: COMMERCIAL

## 2023-10-22 DIAGNOSIS — C92.41 ACUTE PROMYELOCYTIC LEUKEMIA IN REMISSION (HCC): ICD-10-CM

## 2023-10-22 LAB
ALBUMIN SERPL BCP-MCNC: 4 G/DL (ref 3.5–5)
ALP SERPL-CCNC: 61 U/L (ref 34–104)
ALT SERPL W P-5'-P-CCNC: 28 U/L (ref 7–52)
ANION GAP SERPL CALCULATED.3IONS-SCNC: 6 MMOL/L
AST SERPL W P-5'-P-CCNC: 19 U/L (ref 13–39)
BASOPHILS # BLD AUTO: 0.05 THOUSANDS/ÂΜL (ref 0–0.1)
BASOPHILS NFR BLD AUTO: 1 % (ref 0–1)
BILIRUB SERPL-MCNC: 0.73 MG/DL (ref 0.2–1)
BUN SERPL-MCNC: 20 MG/DL (ref 5–25)
CALCIUM SERPL-MCNC: 8.6 MG/DL (ref 8.4–10.2)
CHLORIDE SERPL-SCNC: 107 MMOL/L (ref 96–108)
CO2 SERPL-SCNC: 29 MMOL/L (ref 21–32)
CREAT SERPL-MCNC: 0.85 MG/DL (ref 0.6–1.3)
EOSINOPHIL # BLD AUTO: 0.3 THOUSAND/ÂΜL (ref 0–0.61)
EOSINOPHIL NFR BLD AUTO: 6 % (ref 0–6)
ERYTHROCYTE [DISTWIDTH] IN BLOOD BY AUTOMATED COUNT: 11.8 % (ref 11.6–15.1)
GFR SERPL CREATININE-BSD FRML MDRD: 102 ML/MIN/1.73SQ M
GLUCOSE P FAST SERPL-MCNC: 99 MG/DL (ref 65–99)
HCT VFR BLD AUTO: 44.3 % (ref 36.5–49.3)
HGB BLD-MCNC: 15.2 G/DL (ref 12–17)
IMM GRANULOCYTES # BLD AUTO: 0.01 THOUSAND/UL (ref 0–0.2)
IMM GRANULOCYTES NFR BLD AUTO: 0 % (ref 0–2)
LYMPHOCYTES # BLD AUTO: 1.23 THOUSANDS/ÂΜL (ref 0.6–4.47)
LYMPHOCYTES NFR BLD AUTO: 25 % (ref 14–44)
MCH RBC QN AUTO: 32 PG (ref 26.8–34.3)
MCHC RBC AUTO-ENTMCNC: 34.3 G/DL (ref 31.4–37.4)
MCV RBC AUTO: 93 FL (ref 82–98)
MONOCYTES # BLD AUTO: 0.51 THOUSAND/ÂΜL (ref 0.17–1.22)
MONOCYTES NFR BLD AUTO: 11 % (ref 4–12)
NEUTROPHILS # BLD AUTO: 2.78 THOUSANDS/ÂΜL (ref 1.85–7.62)
NEUTS SEG NFR BLD AUTO: 57 % (ref 43–75)
NRBC BLD AUTO-RTO: 0 /100 WBCS
PLATELET # BLD AUTO: 223 THOUSANDS/UL (ref 149–390)
PMV BLD AUTO: 10.7 FL (ref 8.9–12.7)
POTASSIUM SERPL-SCNC: 3.7 MMOL/L (ref 3.5–5.3)
PROT SERPL-MCNC: 6.6 G/DL (ref 6.4–8.4)
RBC # BLD AUTO: 4.75 MILLION/UL (ref 3.88–5.62)
SODIUM SERPL-SCNC: 142 MMOL/L (ref 135–147)
WBC # BLD AUTO: 4.88 THOUSAND/UL (ref 4.31–10.16)

## 2023-10-22 PROCEDURE — 80053 COMPREHEN METABOLIC PANEL: CPT

## 2023-10-22 PROCEDURE — 36415 COLL VENOUS BLD VENIPUNCTURE: CPT

## 2023-10-22 PROCEDURE — 85025 COMPLETE CBC W/AUTO DIFF WBC: CPT

## 2023-10-23 ENCOUNTER — OFFICE VISIT (OUTPATIENT)
Dept: HEMATOLOGY ONCOLOGY | Facility: CLINIC | Age: 48
End: 2023-10-23
Payer: COMMERCIAL

## 2023-10-23 VITALS
HEIGHT: 69 IN | WEIGHT: 215.4 LBS | HEART RATE: 119 BPM | BODY MASS INDEX: 31.9 KG/M2 | RESPIRATION RATE: 17 BRPM | OXYGEN SATURATION: 97 % | TEMPERATURE: 98.4 F | SYSTOLIC BLOOD PRESSURE: 124 MMHG | DIASTOLIC BLOOD PRESSURE: 80 MMHG

## 2023-10-23 DIAGNOSIS — C92.41 ACUTE PROMYELOCYTIC LEUKEMIA IN REMISSION (HCC): Primary | ICD-10-CM

## 2023-10-23 PROCEDURE — 99213 OFFICE O/P EST LOW 20 MIN: CPT | Performed by: INTERNAL MEDICINE

## 2023-10-23 NOTE — LETTER
October 23, 2023     Anabela Mcconnell, [address] [facility] [address] [facility]    Patient: Hiren Gamez   YOB: 1975   Date of Visit: 10/23/2023       Dear Dr. Hammer Handing: Thank you for referring Hiren Gamez to me for evaluation. Below are my notes for this consultation. If you have questions, please do not hesitate to call me. I look forward to following your patient along with you. Sincerely,        Queen MagdaDO        CC: No Recipients    Blakely MagdaDO  10/23/2023  3:37 PM  Sign when Signing Visit  [address]  [address] [facility],[address] [place] [id number]    Charli Vincent Moreland,1975, [id number]  10/23/23    Discussion:   In summary, this is a 79-year-old male with a history of APL as outlined. Clinically he is Stable. He generally functions without restriction. Recent CBC and CMP are normal.  PML/Rambaran negative. We reviewed that further follow-up would be considered optional.  The large majority of patients with APL who relapsed do so in the first few years. At this point I would say it is highly unlikely that he would have a relapse of his APL. He is up-to-date with colonoscopy. I enforced the importance of continued follow-up under his PCP for general medical care. I discussed the above with the patient. The patient  voiced understanding and agreement.  ______________________________________________________________________    Chief Complaint   Patient presents with   • Follow-up       HPI:  Oncology History   Acute promyelocytic leukemia in remission ([address])   6/9/2015 Initial Diagnosis    Acute promyelocytic leukemia in remission [facility])  Patient presented to the hospital with bruising going on for a week or more. White blood cell count 51.9, hemoglobin 11.7, platelet count 17, 36% blasts.  Fibrinogen 73, INR 1.96, PTT 31.      6/10/2015 Biopsy    Flow cytometry was consistent with a diagnosis of acute promyelocytic leukemia. 6/16/2015 -  Chemotherapy    Patient was started on ATRA 45 mg per meter squared in divided doses. Idarubicin was started 6/16/15 and administered 12 mg per meter squared days +2, +4, +6, pulse 8. On day #9 arsenic trioxide 0.15 mg per kilogram was initiated and continued daily. Treatment was complicated by stomatitis, pulmonary infiltrate (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 syndrome). He was treated with a variety of antibiotics including acyclovir Mycafungin, cefuroxime. He was treated with allopurinol and potassium. 8/24/2015 - 2/2016 Chemotherapy    ATRA 2 weeks on, 2 weeks off,   Trisenox 4 weeks on, 4 weeks off.      3/10/2016 Remission           Interval History: Clinically stable. ECOG-  0 - Asymptomatic    Review of Systems   Constitutional:  Negative for chills and fever. HENT:  Negative for nosebleeds. Eyes:  Negative for discharge. Respiratory:  Negative for cough and shortness of breath. Cardiovascular:  Negative for chest pain. Gastrointestinal:  Negative for abdominal pain, constipation and diarrhea. Endocrine: Negative for polydipsia. Genitourinary:  Negative for hematuria. Musculoskeletal:  Negative for arthralgias. Skin:  Negative for color change. Allergic/Immunologic: Negative for immunocompromised state. Neurological:  Negative for dizziness and headaches. Hematological:  Negative for adenopathy. Psychiatric/Behavioral:  Negative for agitation.         Past Medical History:   Diagnosis Date   • 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NAKUL M3) ([facility])    • 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[facility])    • Allergic rhinitis    • Allergic rhinitis 3/8/2014   • Asthma    • COVID-19 virus infection 12/11/2020   • Essential hypertension, benign    • Leukemia ([facility])    • Obesity    • Personal history of other diseases of respiratory system      Patient Active Problem List   Diagnosis   • Acute promyelocytic leukemia in remission [facility])   • Benign essential hypertension   • Seasonal allergies   • Facial skin lesion   • Class 1 obesity due to excess calories with body mass index (BMI) of 33.0 to 33.9 in adult   • Mild intermittent asthma without complication   • Dyslipidemia   • GWENDOLYN (obstructive sleep apnea)   • Skin lesion   • Chronic fatigue       Current Outpatient Medications:   •  albuterol (2.5 mg/3 mL) 0.083 % nebulizer solution, Take 3 mL (2.5 mg total) by nebulization every 6 (six) hours as needed for wheezing or shortness of breath, Disp: 75 mL, Rfl: 0  •  albuterol (VENTOLIN HFA) 90 mcg/act inhaler, Use 2 puffs every 4-6 hr. PRN for chest tightness or wheezing, Disp: 1 Inhaler, Rfl: 3  •  benazepril (LOTENSIN) 20 mg tablet, TAKE 1 TABLET BY MOUTH EVERY DAY, Disp: 90 tablet, Rfl: 1  •  fluticasone-vilanterol (Breo Ellipta) 200-25 mcg/actuation inhaler, Inhale 1 puff daily Rinse mouth after use., Disp: 60 blister, Rfl: 2  •  hydrochlorothiazide (HYDRODIURIL) 12.5 mg tablet, TAKE 1 TABLET BY MOUTH EVERY DAY, Disp: 90 tablet, Rfl: 1  •  multivitamin (THERAGRAN) TABS, Take 1 tablet by mouth daily, Disp: , Rfl:   Allergies   Allergen Reactions   • Penicillins Hives     Past Surgical History:   Procedure Laterality Date   • BONE MARROW BIOPSY W/ ASPIRATION N/A    • PORTACATH PLACEMENT Right    • WISDOM TOOTH EXTRACTION       Social History    Objective:  Vitals:    10/23/23 1508   BP: 124/80   BP Location: Left arm   Patient Position: Sitting   Cuff Size: Adult   Pulse: (!) 119   Resp: 17   Temp: 98.4 °F (36.9 °C)   SpO2: 97%   Weight: 97.7 kg (215 lb 6.4 oz)   Height: 5' 8.5" (1.74 m)     Physical Exam  Constitutional:       Appearance: He is well-developed. HENT:      Head: Normocephalic and atraumatic. Mouth/Throat:      Mouth: Mucous membranes are moist.   Eyes:      Pupils: Pupils are equal, round, and reactive to light. Cardiovascular:      Rate and Rhythm: Normal rate and regular rhythm. Heart sounds: No murmur heard.   Pulmonary:      Breath sounds: Normal breath sounds. No wheezing or rales. Abdominal:      Palpations: Abdomen is soft. Tenderness: There is no abdominal tenderness. Musculoskeletal:         General: No tenderness. Normal range of motion. Cervical back: Neck supple. Lymphadenopathy:      Cervical: No cervical adenopathy. Skin:     Findings: No erythema or rash. Neurological:      Mental Status: He is alert and oriented to person, place, and time. Cranial Nerves: No cranial nerve deficit. Deep Tendon Reflexes: Reflexes are normal and symmetric. Psychiatric:         Behavior: Behavior normal.           Labs: I personally reviewed the labs and imaging pertinent to this patient care.

## 2023-10-23 NOTE — PROGRESS NOTES
[facility]  [address] [facility] [address]  [address]     Juliet Moreland,1975, [id number]  10/23/23    Discussion:   In summary, this is a 39-year-old male with a history of APL as outlined. Clinically he is Stable. He generally functions without restriction. Recent CBC and CMP are normal.  PML/Rambaran negative. We reviewed that further follow-up would be considered optional.  The large majority of patients with APL who relapsed do so in the first few years. At this point I would say it is highly unlikely that he would have a relapse of his APL. He is up-to-date with colonoscopy. I enforced the importance of continued follow-up under his PCP for general medical care. I discussed the above with the patient. The patient  voiced understanding and agreement.  ______________________________________________________________________    Chief Complaint   Patient presents with    Follow-up       HPI:  Oncology History   Acute promyelocytic leukemia in remission ([address] [facility])   6/9/2015 Initial Diagnosis    Acute promyelocytic leukemia in remission [facility])  Patient presented to the hospital with bruising going on for a week or more. White blood cell count 51.9, hemoglobin 11.7, platelet count 17, 19% blasts. Fibrinogen 73, INR 1.96, PTT 31.      6/10/2015 Biopsy    Flow cytometry was consistent with a diagnosis of acute promyelocytic leukemia. 6/16/2015 -  Chemotherapy    Patient was started on ATRA 45 mg per meter squared in divided doses. Idarubicin was started 6/16/15 and administered 12 mg per meter squared days +2, +4, +6, pulse 8. On day #9 arsenic trioxide 0.15 mg per kilogram was initiated and continued daily. Treatment was complicated by stomatitis, pulmonary infiltrate (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 syndrome). He was treated with a variety of antibiotics including acyclovir Mycafungin, cefuroxime.  He was treated with allopurinol and potassium. 8/24/2015 - 2/2016 Chemotherapy    ATRA 2 weeks on, 2 weeks off,   Trisenox 4 weeks on, 4 weeks off.      3/10/2016 Remission           Interval History: Clinically stable. ECOG-  0 - Asymptomatic    Review of Systems   Constitutional:  Negative for chills and fever. HENT:  Negative for nosebleeds. Eyes:  Negative for discharge. Respiratory:  Negative for cough and shortness of breath. Cardiovascular:  Negative for chest pain. Gastrointestinal:  Negative for abdominal pain, constipation and diarrhea. Endocrine: Negative for polydipsia. Genitourinary:  Negative for hematuria. Musculoskeletal:  Negative for arthralgias. Skin:  Negative for color change. Allergic/Immunologic: Negative for immunocompromised state. Neurological:  Negative for dizziness and headaches. Hematological:  Negative for adenopathy. Psychiatric/Behavioral:  Negative for agitation.         Past Medical History:   Diagnosis Date    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NAKUL M3) ([address])     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[address])     Allergic rhinitis     Allergic rhinitis 3/8/2014    Asthma     COVID-19 virus infection 12/11/2020    Essential hypertension, benign     Leukemia ([address])     Obesity     Personal history of other diseases of respiratory system      Patient Active Problem List   Diagnosis    Acute promyelocytic leukemia in remission ([address])    Benign essential hypertension    Seasonal allergies    Facial skin lesion    Class 1 obesity due to excess calories with body mass index (BMI) of 33.0 to 33.9 in adult    Mild intermittent asthma without complication    Dyslipidemia    GWENDOLYN (obstructive sleep apnea)    Skin lesion    Chronic fatigue       Current Outpatient Medications:     albuterol (2.5 mg/3 mL) 0.083 % nebulizer solution, Take 3 mL (2.5 mg total) by nebulization every 6 (six) hours as needed for wheezing or shortness of breath, Disp: 75 mL, Rfl: 0    albuterol (VENTOLIN HFA) 90 mcg/act inhaler, Use 2 puffs every 4-6 hr. PRN for chest tightness or wheezing, Disp: 1 Inhaler, Rfl: 3    benazepril (LOTENSIN) 20 mg tablet, TAKE 1 TABLET BY MOUTH EVERY DAY, Disp: 90 tablet, Rfl: 1    fluticasone-vilanterol (Breo Ellipta) 200-25 mcg/actuation inhaler, Inhale 1 puff daily Rinse mouth after use., Disp: 60 blister, Rfl: 2    hydrochlorothiazide (HYDRODIURIL) 12.5 mg tablet, TAKE 1 TABLET BY MOUTH EVERY DAY, Disp: 90 tablet, Rfl: 1    multivitamin (THERAGRAN) TABS, Take 1 tablet by mouth daily, Disp: , Rfl:   Allergies   Allergen Reactions    Penicillins Hives     Past Surgical History:   Procedure Laterality Date    BONE MARROW BIOPSY W/ ASPIRATION N/A     PORTACATH PLACEMENT Right     WISDOM TOOTH EXTRACTION       Social History     Objective:  Vitals:    10/23/23 1508   BP: 124/80   BP Location: Left arm   Patient Position: Sitting   Cuff Size: Adult   Pulse: (!) 119   Resp: 17   Temp: 98.4 °F (36.9 °C)   SpO2: 97%   Weight: 97.7 kg (215 lb 6.4 oz)   Height: 5' 8.5" (1.74 m)     Physical Exam  Constitutional:       Appearance: He is well-developed. HENT:      Head: Normocephalic and atraumatic. Mouth/Throat:      Mouth: Mucous membranes are moist.   Eyes:      Pupils: Pupils are equal, round, and reactive to light. Cardiovascular:      Rate and Rhythm: Normal rate and regular rhythm. Heart sounds: No murmur heard. Pulmonary:      Breath sounds: Normal breath sounds. No wheezing or rales. Abdominal:      Palpations: Abdomen is soft. Tenderness: There is no abdominal tenderness. Musculoskeletal:         General: No tenderness. Normal range of motion. Cervical back: Neck supple. Lymphadenopathy:      Cervical: No cervical adenopathy. Skin:     Findings: No erythema or rash. Neurological:      Mental Status: He is alert and oriented to person, place, and time. Cranial Nerves: No cranial nerve deficit. Deep Tendon Reflexes: Reflexes are normal and symmetric.    Psychiatric: Behavior: Behavior normal.           Labs: I personally reviewed the labs and imaging pertinent to this patient care.

## 2023-12-29 DIAGNOSIS — J45.20 MILD INTERMITTENT ASTHMA WITHOUT COMPLICATION: ICD-10-CM

## 2023-12-29 RX ORDER — FLUTICASONE FUROATE AND VILANTEROL TRIFENATATE 200; 25 UG/1; UG/1
POWDER RESPIRATORY (INHALATION)
Qty: 60 EACH | Refills: 2 | Status: SHIPPED | OUTPATIENT
Start: 2023-12-29

## 2024-01-30 ENCOUNTER — RA CDI HCC (OUTPATIENT)
Dept: OTHER | Facility: HOSPITAL | Age: 49
End: 2024-01-30

## 2024-01-30 NOTE — PROGRESS NOTES
HCC coding opportunities          Chart Reviewed number of suggestions sent to Provider: 1     Patients Insurance        Commercial Insurance: [employer]

## 2024-01-31 ENCOUNTER — OFFICE VISIT (OUTPATIENT)
Dept: FAMILY MEDICINE CLINIC | Facility: CLINIC | Age: 49
End: 2024-01-31
Payer: COMMERCIAL

## 2024-01-31 VITALS
HEIGHT: 69 IN | WEIGHT: 218.8 LBS | TEMPERATURE: 98.1 F | BODY MASS INDEX: 32.41 KG/M2 | SYSTOLIC BLOOD PRESSURE: 136 MMHG | DIASTOLIC BLOOD PRESSURE: 84 MMHG | OXYGEN SATURATION: 98 % | RESPIRATION RATE: 18 BRPM | HEART RATE: 81 BPM

## 2024-01-31 DIAGNOSIS — G47.33 OSA (OBSTRUCTIVE SLEEP APNEA): ICD-10-CM

## 2024-01-31 DIAGNOSIS — C92.41 ACUTE PROMYELOCYTIC LEUKEMIA IN REMISSION (HCC): ICD-10-CM

## 2024-01-31 DIAGNOSIS — I10 BENIGN ESSENTIAL HYPERTENSION: ICD-10-CM

## 2024-01-31 DIAGNOSIS — J45.20 MILD INTERMITTENT ASTHMA WITHOUT COMPLICATION: ICD-10-CM

## 2024-01-31 DIAGNOSIS — F41.9 ANXIETY: ICD-10-CM

## 2024-01-31 DIAGNOSIS — E78.5 DYSLIPIDEMIA: ICD-10-CM

## 2024-01-31 DIAGNOSIS — R53.83 OTHER FATIGUE: Primary | ICD-10-CM

## 2024-01-31 DIAGNOSIS — R53.82 CHRONIC FATIGUE: ICD-10-CM

## 2024-01-31 PROBLEM — L98.9 FACIAL SKIN LESION: Status: RESOLVED | Noted: 2018-11-08 | Resolved: 2024-01-31

## 2024-01-31 PROCEDURE — 99214 OFFICE O/P EST MOD 30 MIN: CPT | Performed by: NURSE PRACTITIONER

## 2024-01-31 RX ORDER — IPRATROPIUM BROMIDE 42 UG/1
SPRAY, METERED NASAL
COMMUNITY
Start: 2023-12-24

## 2024-01-31 RX ORDER — SERTRALINE HYDROCHLORIDE 25 MG/1
25 TABLET, FILM COATED ORAL DAILY
Qty: 90 TABLET | Refills: 1 | Status: SHIPPED | OUTPATIENT
Start: 2024-01-31 | End: 2024-07-29

## 2024-01-31 NOTE — PROGRESS NOTES
[facility]    NAME: Franco Moreland  AGE: 48 y.o. SEX: male  : 1975     DATE: 2024     Assessment and Plan:     Problem List Items Addressed This Visit        Respiratory    Mild intermittent asthma without complication     Well controlled. Continues with Breo Ellipta daily.  Managed by allergist         GWENDOLYN (obstructive sleep apnea)     He is using his CPAP about 5 times weekly. He continues to be concerned regarding fatigue.  Compliance recommended. Weight loss recommended            Cardiovascular and Mediastinum    Benign essential hypertension     Blood pressure in the office today is 136/84.  He continues on his lotensin and hydrochlorathiazide.  Low salt diet and weight loss recommended.          Relevant Orders    CBC and differential    Comprehensive metabolic panel       Other    Acute promyelocytic leukemia in remission (HCC)     Continued surveillance with hematology.          Relevant Orders    Vitamin D 25 hydroxy    Vitamin B12    CBC and differential    BMI 32.0-32.9,adult     Discussed lifestyle changes including diet and exercise.    Diet should include switching from simple carbohydrates like white rice, white pasta, white bread to brown rice, wheat pasta, wheat bread.  Increase exercise to 150 minutes per week, should include cardio and weightlifting.           Dyslipidemia     Low fat diet and exercise recommended.     Component      Latest Ref Rng 2023   Cholesterol      See Comment mg/dL 122    Triglycerides      See Comment mg/dL 82    HDL      >=40 mg/dL 32 (L)    LDL Calculated      0 - 100 mg/dL 74       Legend:  (L) Low         Relevant Orders    Lipid Panel with Direct LDL reflex    Chronic fatigue     Ongoing chronic fatigue since being treated for leukemia.  He does have a history of sleep apnea and is not 100% compliant with CPAP use. Not exercising, not following a healthy diet.  Blood work ordered.  Encouraged low  carb diet and exercise.          Anxiety     Patient is concerned regarding anxiety.  He has never been on medication for anxiety.  ALLISON score positive. Medications discussed, zoloft sent to his pharmacy, side effects discussed.      ALLISON-7 Flowsheet Screening      Flowsheet Row Most Recent Value   Over the last 2 weeks, how often have you been bothered by any of the following problems?    Feeling nervous, anxious, or on edge 2   Not being able to stop or control worrying 3   Worrying too much about different things 3   Trouble relaxing 0   Being so restless that it is hard to sit still 0   Becoming easily annoyed or irritable 2   Feeling afraid as if something awful might happen 1   ALLISON-7 Total Score 11                 Relevant Medications    sertraline (ZOLOFT) 25 mg tablet   Other Visit Diagnoses     Other fatigue    -  Primary    Relevant Orders    Vitamin D 25 hydroxy    Vitamin B12            Depression Screening and Follow-up Plan: Patient was screened for depression during today's encounter. They screened negative with a PHQ-2 score of 0.       Return in about 6 months (around 7/31/2024) for Briana Cha, yearly physical exam.     Chief Complaint:     Chief Complaint   Patient presents with   • Follow-up     6 month f/u        History of Present Illness:   Buzz presents to the office today for follow up. He continues to follow with hematology for his leukemia which has been in remission. Continued fatigue since treatment for leukemia. Poor diet and no exercise.  Low carb diet recommended. Encouraged the patient to start exercising 3-5 times per week. Try to use CPAP nightly to help with sleep and daytime fatigue. Declines flu shot in the office today. Follow up in six months for yearly physical, surveillance blood work ordered.       Review of Systems:     Review of Systems   Constitutional:  Positive for fatigue.   HENT: Negative.  Negative for congestion, postnasal drip, rhinorrhea and trouble swallowing.   "  Eyes: Negative.  Negative for visual disturbance.   Respiratory: Negative.  Negative for choking and shortness of breath.    Cardiovascular: Negative.  Negative for chest pain.   Gastrointestinal: Negative.    Endocrine: Negative.    Genitourinary: Negative.    Musculoskeletal: Negative.  Negative for arthralgias, back pain, myalgias and neck pain.   Skin: Negative.    Neurological:  Negative for dizziness and headaches.   Psychiatric/Behavioral:  The patient is nervous/anxious.         Problem List:     Patient Active Problem List   Diagnosis   • Acute promyelocytic leukemia in remission (HCC)   • Benign essential hypertension   • Seasonal allergies   • BMI 32.0-32.9,adult   • Mild intermittent asthma without complication   • Dyslipidemia   • GWENDOLYN (obstructive sleep apnea)   • Skin lesion   • Chronic fatigue   • Anxiety        Objective:     /84   Pulse 81   Temp 98.1 °F (36.7 °C) (Tympanic)   Resp 18   Ht 5' 8.5\" (1.74 m)   Wt 99.2 kg (218 lb 12.8 oz)   SpO2 98%   BMI 32.78 kg/m²     Current Outpatient Medications   Medication Sig Dispense Refill   • albuterol (2.5 mg/3 mL) 0.083 % nebulizer solution Take 3 mL (2.5 mg total) by nebulization every 6 (six) hours as needed for wheezing or shortness of breath 75 mL 0   • albuterol (VENTOLIN HFA) 90 mcg/act inhaler Use 2 puffs every 4-6 hr. PRN for chest tightness or wheezing 1 Inhaler 3   • benazepril (LOTENSIN) 20 mg tablet TAKE 1 TABLET BY MOUTH EVERY DAY 90 tablet 1   • fluticasone-vilanterol (Breo Ellipta) 200-25 mcg/actuation inhaler INHALE 1 PUFF DAILY RINSE MOUTH AFTER USE. 60 each 2   • hydrochlorothiazide (HYDRODIURIL) 12.5 mg tablet TAKE 1 TABLET BY MOUTH EVERY DAY 90 tablet 1   • ipratropium (ATROVENT) 0.06 % nasal spray TAKE 2 SPRAYS 4 TIMES A DAY TO BOTH NOSTRILS     • multivitamin (THERAGRAN) TABS Take 1 tablet by mouth daily     • sertraline (ZOLOFT) 25 mg tablet Take 1 tablet (25 mg total) by mouth daily 90 tablet 1     No current " facility-administered medications for this visit.       Physical Exam  Vitals reviewed.   Constitutional:       Appearance: Normal appearance. He is obese.   HENT:      Head: Normocephalic and atraumatic.      Nose: Nose normal.      Mouth/Throat:      Mouth: Mucous membranes are moist.   Eyes:      Extraocular Movements: Extraocular movements intact.      Pupils: Pupils are equal, round, and reactive to light.   Cardiovascular:      Rate and Rhythm: Normal rate and regular rhythm.      Pulses: Normal pulses.      Heart sounds: Normal heart sounds.   Pulmonary:      Effort: Pulmonary effort is normal.      Breath sounds: Normal breath sounds.   Musculoskeletal:         General: Normal range of motion.   Skin:     General: Skin is warm.   Neurological:      General: No focal deficit present.      Mental Status: He is alert and oriented to person, place, and time. Mental status is at baseline.   Psychiatric:         Mood and Affect: Mood normal.         Behavior: Behavior normal.         Thought Content: Thought content normal.         Judgment: Judgment normal.         MELANIA Malloy  [facility]

## 2024-02-01 PROBLEM — F41.9 ANXIETY: Status: ACTIVE | Noted: 2024-02-01

## 2024-02-01 NOTE — ASSESSMENT & PLAN NOTE
Discussed lifestyle changes including diet and exercise.    Diet should include switching from simple carbohydrates like white rice, white pasta, white bread to brown rice, wheat pasta, wheat bread.  Increase exercise to 150 minutes per week, should include cardio and weightlifting.

## 2024-02-01 NOTE — ASSESSMENT & PLAN NOTE
Patient is concerned regarding anxiety.  He has never been on medication for anxiety.  ALLISON score positive. Medications discussed, zoloft sent to his pharmacy, side effects discussed.      ALLISON-7 Flowsheet Screening      Flowsheet Row Most Recent Value   Over the last 2 weeks, how often have you been bothered by any of the following problems?    Feeling nervous, anxious, or on edge 2   Not being able to stop or control worrying 3   Worrying too much about different things 3   Trouble relaxing 0   Being so restless that it is hard to sit still 0   Becoming easily annoyed or irritable 2   Feeling afraid as if something awful might happen 1   ALLISON-7 Total Score 11

## 2024-02-01 NOTE — ASSESSMENT & PLAN NOTE
Blood pressure in the office today is 136/84.  He continues on his lotensin and hydrochlorathiazide.  Low salt diet and weight loss recommended.

## 2024-02-01 NOTE — ASSESSMENT & PLAN NOTE
Ongoing chronic fatigue since being treated for leukemia.  He does have a history of sleep apnea and is not 100% compliant with CPAP use. Not exercising, not following a healthy diet.  Blood work ordered.  Encouraged low carb diet and exercise.

## 2024-02-01 NOTE — ASSESSMENT & PLAN NOTE
He is using his CPAP about 5 times weekly. He continues to be concerned regarding fatigue.  Compliance recommended. Weight loss recommended

## 2024-02-01 NOTE — ASSESSMENT & PLAN NOTE
Low fat diet and exercise recommended.     Component      Latest Ref Rng 8/19/2023   Cholesterol      See Comment mg/dL 122    Triglycerides      See Comment mg/dL 82    HDL      >=40 mg/dL 32 (L)    LDL Calculated      0 - 100 mg/dL 74       Legend:  (L) Low

## 2024-02-27 DIAGNOSIS — I10 BENIGN ESSENTIAL HYPERTENSION: ICD-10-CM

## 2024-02-27 RX ORDER — HYDROCHLOROTHIAZIDE 12.5 MG/1
TABLET ORAL
Qty: 90 TABLET | Refills: 1 | Status: SHIPPED | OUTPATIENT
Start: 2024-02-27

## 2024-02-27 RX ORDER — BENAZEPRIL HYDROCHLORIDE 20 MG/1
TABLET ORAL
Qty: 90 TABLET | Refills: 1 | Status: SHIPPED | OUTPATIENT
Start: 2024-02-27

## 2024-03-03 ENCOUNTER — APPOINTMENT (OUTPATIENT)
Dept: LAB | Facility: CLINIC | Age: 49
End: 2024-03-03
Payer: COMMERCIAL

## 2024-03-03 DIAGNOSIS — R53.83 OTHER FATIGUE: ICD-10-CM

## 2024-03-03 DIAGNOSIS — C92.41 ACUTE PROMYELOCYTIC LEUKEMIA IN REMISSION (HCC): ICD-10-CM

## 2024-03-03 LAB
25(OH)D3 SERPL-MCNC: 19.8 NG/ML (ref 30–100)
VIT B12 SERPL-MCNC: 397 PG/ML (ref 180–914)

## 2024-03-03 PROCEDURE — 82306 VITAMIN D 25 HYDROXY: CPT

## 2024-03-03 PROCEDURE — 36415 COLL VENOUS BLD VENIPUNCTURE: CPT

## 2024-03-04 DIAGNOSIS — E55.9 VITAMIN D DEFICIENCY: Primary | ICD-10-CM

## 2024-03-04 RX ORDER — ERGOCALCIFEROL 1.25 MG/1
50000 CAPSULE ORAL WEEKLY
Qty: 12 CAPSULE | Refills: 0 | Status: SHIPPED | OUTPATIENT
Start: 2024-03-04 | End: 2024-05-21

## 2024-06-11 DIAGNOSIS — E55.9 VITAMIN D DEFICIENCY: ICD-10-CM

## 2024-06-12 RX ORDER — ERGOCALCIFEROL 1.25 MG/1
50000 CAPSULE ORAL WEEKLY
Qty: 12 CAPSULE | Refills: 0 | Status: SHIPPED | OUTPATIENT
Start: 2024-06-12 | End: 2024-08-29

## 2024-07-07 DIAGNOSIS — J45.20 MILD INTERMITTENT ASTHMA WITHOUT COMPLICATION: ICD-10-CM

## 2024-07-07 RX ORDER — FLUTICASONE FUROATE AND VILANTEROL TRIFENATATE 200; 25 UG/1; UG/1
POWDER RESPIRATORY (INHALATION)
Qty: 60 EACH | Refills: 2 | Status: SHIPPED | OUTPATIENT
Start: 2024-07-07

## 2024-08-13 ENCOUNTER — OFFICE VISIT (OUTPATIENT)
Dept: FAMILY MEDICINE CLINIC | Facility: CLINIC | Age: 49
End: 2024-08-13
Payer: COMMERCIAL

## 2024-08-13 VITALS
DIASTOLIC BLOOD PRESSURE: 84 MMHG | RESPIRATION RATE: 18 BRPM | BODY MASS INDEX: 32.35 KG/M2 | OXYGEN SATURATION: 98 % | HEART RATE: 84 BPM | SYSTOLIC BLOOD PRESSURE: 128 MMHG | WEIGHT: 218.4 LBS | TEMPERATURE: 98.4 F | HEIGHT: 69 IN

## 2024-08-13 DIAGNOSIS — C92.41 ACUTE PROMYELOCYTIC LEUKEMIA IN REMISSION (HCC): ICD-10-CM

## 2024-08-13 DIAGNOSIS — J45.20 MILD INTERMITTENT ASTHMA WITHOUT COMPLICATION: ICD-10-CM

## 2024-08-13 DIAGNOSIS — I10 BENIGN ESSENTIAL HYPERTENSION: Primary | ICD-10-CM

## 2024-08-13 DIAGNOSIS — G47.33 OSA (OBSTRUCTIVE SLEEP APNEA): ICD-10-CM

## 2024-08-13 DIAGNOSIS — R53.82 CHRONIC FATIGUE: ICD-10-CM

## 2024-08-13 DIAGNOSIS — F41.9 ANXIETY: ICD-10-CM

## 2024-08-13 DIAGNOSIS — E78.5 DYSLIPIDEMIA: ICD-10-CM

## 2024-08-13 PROCEDURE — 99396 PREV VISIT EST AGE 40-64: CPT | Performed by: NURSE PRACTITIONER

## 2024-08-13 NOTE — ASSESSMENT & PLAN NOTE
Blood pressure in the office today is 128/84.  He continues on his benazepril and hydrochlorothiazide.  Low-salt diet recommended.  Weight loss recommended.

## 2024-08-13 NOTE — ASSESSMENT & PLAN NOTE
Patient was placed on Zoloft at his last visit.  He feels his anxiety level is improved however would like to increase his dose.  50 mg sent to the pharmacy.

## 2024-08-13 NOTE — PATIENT INSTRUCTIONS
"Patient Education     DASH diet   The Basics   Written by the doctors and editors at [facility]   What is the DASH diet? -- DASH stands for \"dietary approaches to stop hypertension.\" It is an eating plan that can help lower blood pressure. It can also help prevent high blood pressure, which doctors call \"hypertension.\" You don't need special foods or recipes to follow the DASH diet. It is more about eating certain types of foods in certain amounts.  The DASH diet has lots of fruits and vegetables, whole grains, lean meats, healthy fats, and low-fat or fat-free dairy products (figure 1). It is low in saturated fats, trans fats, cholesterol, added sugars, and sodium (salt).  The standard DASH diet limits sodium to no more than 2300 mg a day. Your doctor or nurse can talk to you about what your specific goals should be.  Why do I need the DASH diet? -- The DASH diet can help you:   Lower your blood pressure and cholesterol   Lower your risk for cancer, heart disease, heart attack, and stroke. It might also lower your risk for heart failure, kidney stones, and diabetes.   Lose weight or keep a healthy weight  What can I eat and drink on the DASH diet? -- Below are some guidelines and examples for your daily and weekly nutrition goals. These are based on a 2000-calorie-per-day eating plan.  Daily goals:   Grains - Try to eat 6 to 8 servings of whole-grain, high-fiber foods each day. Examples of a serving include 1 slice of bread, 1 ounce (30 grams) of dry cereal, or 1/2 cup (120 grams) of cooked cereal, pasta, or brown rice.   Fruits - Try to eat 4 to 5 servings of fruit each day. Examples of a serving include 1 medium fruit or 1/2 cup (75 grams) of fresh, frozen, or canned fruit. Try to eat different kinds and colors. Frozen or canned fruit should not have added sugar. Look for frozen or canned fruits with 100 percent fruit juice or water.   Vegetables - Try to eat 4 to 5 servings of vegetables each day. Examples of a " "serving include 1 cup (40 grams) of leafy greens or 1/2 cup (75 grams) of fresh or cooked vegetables. Try to pick many kinds and colors. If you buy canned vegetables, look for \"low sodium\" or \"salt free.\" Buy plain, frozen vegetables to avoid added fat and sodium.   Dairy - Try to eat 2 to 3 servings of fat-free or low-fat milk products each day. Examples of a serving include 1 cup (240 mL) of milk or yogurt or 1.5 ounces (45 grams) of cheese.   Lean meats, poultry, and seafood - Try to eat 6 or fewer servings of lean meat, poultry, and seafood each day. Examples of a serving include 1 egg or 1 ounce (30 grams) of cooked meat, poultry, or fish. Try to choose more low-fat or lean meats like chicken, fish, or turkey. Eat less red meat.   Fats and oils - Try to eat 2 to 3 servings of fats and oils each day. Examples of a serving include 1 teaspoon (5 mL) of soft margarine or vegetable oil, or 1 tablespoon (18 grams) of mayonnaise. Eat healthy fats like those found in fish, nuts, and avocados. Try using olive oil or vegetable oils such as canola oil. You can also try corn, safflower, sunflower, or soybean oils. Use low-sodium and low-fat salad dressing and mayonnaise.  Weekly goals:   Nuts, seeds, and legumes (dry beans and peas) - Try to eat 4 to 5 servings each week. Examples of a serving include 1/3 cup (45 grams) of nuts, 2 tablespoons (50 grams) of nut butter or seeds, or 1/2 cup (75 grams) of cooked legumes. Try almonds and walnuts, sunflower seeds, peanut or other nut butters, soybeans, lentils, kidney beans, and split peas.   Sweets - Try to eat fewer than 5 servings each week. Examples of a serving include 1 tablespoon (14 grams) of sugar or jelly, or 1/2 cup (120 grams) of gelatin. Choose low-fat and trans fat-free desserts. These include fruit-flavored gelatin, sorbet, jellybeans, betty crackers, animal crackers, low-fat fig bars, and nishant snaps. Eat fruit to satisfy the desire for sweets.  To add flavor, " use pepper, herbs, spices, vinegar, or lemon or lime juices. Choose low-sodium or salt-free products whenever you can. This is especially important for foods like broths, soups, or soy sauce.  What foods and drinks should I avoid on the DASH diet?    Grains to avoid - Salted breads, rolls, crackers, quick breads, self-rising flours, biscuit mixes, regular breadcrumbs, instant hot cereals, commercially prepared rice, pasta, stuffing mixes.   Fruits and vegetables to avoid - Store-bought prepared potatoes and vegetable mixes, regular canned vegetables and juices, vegetables frozen with sauce, pickled vegetables, processed fruits with salt or sodium.   Dairy products to avoid - Whole milk, malted milk, chocolate milk, buttermilk, full-fat cheese, ice cream.   Meats to avoid - Smoked, cured, salted, or canned fish such as sardines or anchovies. High-fat cuts of meat like beef, lamb, pork, mckenna and sausage, and chicken with the skin on it.   Fats and oils to avoid - Eat fewer solid fats like butter, lard, and hard stick margarine. Eat less saturated fat, trans fat, and total fat.   Condiments and snacks to avoid - Salted and canned peas, beans, and olives. Salted snack foods, fried foods, soda, other sweetened drinks.   Sweets to avoid - High-fat baked goods such as muffins, donuts, pastries, and commercial baked goods. Candy bars.   Alcohol - If you choose to drink alcohol, limit the amount. Most doctors recommend limiting alcohol to no more than 1 drink a day (for females) or 2 drinks a day (for males).  What else do I need to know?    Get regular physical activity to make this diet help you even more. Even gentle forms of activity, like walking, are good for your health.   Try baking or broiling instead of frying foods.   Write down the foods that you eat. This will help you track what you have eaten each week.   When you go to the grocery store, have a list or a meal plan. Don't shop when you are hungry, since this  might lead you to buy more unhealthy foods.   Read food labels with care (figure 2). They show you how much is in a serving. The amount is given as a percentage of the total amount that you need each day. Reading labels helps you make healthy food choices.  All topics are updated as new evidence becomes available and our peer review process is complete.  This topic retrieved from [employer] on: Feb 26, 2024.  Topic 262182 Version 1.0  Release: 32.2.4 - C32.56  © 2024 UpToDate, Inc. and/or its affiliates. All rights reserved.  figure 1: DASH diet     Graphic 803966 Version 1.0  figure 2: Food label     Graphic 070630 Version 1.0  Consumer Information Use and Disclaimer   Disclaimer: This generalized information is a limited summary of diagnosis, treatment, and/or medication information. It is not meant to be comprehensive and should be used as a tool to help the user understand and/or assess potential diagnostic and treatment options. It does NOT include all information about conditions, treatments, medications, side effects, or risks that may apply to a specific patient. It is not intended to be medical advice or a substitute for the medical advice, diagnosis, or treatment of a health care provider based on the health care provider's examination and assessment of a patient's specific and unique circumstances. Patients must speak with a health care provider for complete information about their health, medical questions, and treatment options, including any risks or benefits regarding use of medications. This information does not endorse any treatments or medications as safe, effective, or approved for treating a specific patient. UpToDate, Inc. and its affiliates disclaim any warranty or liability relating to this information or the use thereof.The use of this information is governed by the Terms of Use, available at https://www.[employer]uwer.com/en/know/clinical-effectiveness-terms. 2024© UpToDate, Inc. and its  affiliates and/or licensors. All rights reserved.  Copyright   © 2024 [employer] and/or its affiliates. All rights reserved.

## 2024-08-13 NOTE — ASSESSMENT & PLAN NOTE
Diagnosed with mild obstructive sleep apnea in 2022.  He did try CPAP however he says he cannot tolerate it.  He sleeps approximately 6 hours per night.  Some days he does feel more tired.  He does not wish to see sleep medicine at this time.  Weight loss recommended.

## 2024-08-13 NOTE — ASSESSMENT & PLAN NOTE
Well-controlled.  Patient continues on Breo elliptica.  He has not had to use his albuterol inhaler.

## 2024-08-13 NOTE — PROGRESS NOTES
Adult Annual Physical  Name: Franco Moreland      : 1975      MRN: [id number]  Encounter Provider: MELANIA Malloy  Encounter Date: 2024   Encounter department: [facility]    Assessment & Plan   1. Benign essential hypertension  Assessment & Plan:  Blood pressure in the office today is 128/84.  He continues on his benazepril and hydrochlorothiazide.  Low-salt diet recommended.  Weight loss recommended.  2. Mild intermittent asthma without complication  Assessment & Plan:  Well-controlled.  Patient continues on Breo elliptica.  He has not had to use his albuterol inhaler.  3. GWENDOLYN (obstructive sleep apnea)  Assessment & Plan:  Diagnosed with mild obstructive sleep apnea in .  He did try CPAP however he says he cannot tolerate it.  He sleeps approximately 6 hours per night.  Some days he does feel more tired.  He does not wish to see sleep medicine at this time.  Weight loss recommended.    4. Anxiety  Assessment & Plan:  Patient was placed on Zoloft at his last visit.  He feels his anxiety level is improved however would like to increase his dose.  50 mg sent to the pharmacy.  Orders:  -     sertraline (ZOLOFT) 50 mg tablet; Take 1 tablet (50 mg total) by mouth daily  5. 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  Will continue to observe.  6. BMI 32.0-32.9,adult  Assessment & Plan:  Discussed lifestyle changes including diet and exercise.    Diet should include switching from simple carbohydrates like white rice, white pasta, white bread to brown rice, wheat pasta, wheat bread.  Increase exercise to 150 minutes per week, should include cardio and weightlifting.    7. Dyslipidemia  Assessment & Plan:  Due for lipid panel.  Low-fat diet recommended.  Exercise recommended.  Weight loss recommended.  8. Chronic fatigue    Immunizations and preventive care screenings were discussed with patient today.  Appropriate education was printed on patient's after visit summary.        Counseling:  Alcohol/drug use: discussed moderation in alcohol intake, the recommendations for healthy alcohol use, and avoidance of illicit drug use.  Dental Health: discussed importance of regular tooth brushing, flossing, and dental visits.  Injury prevention: discussed safety/seat belts, safety helmets, smoke detectors, carbon dioxide detectors, and smoking near bedding or upholstery.  Sexual health: discussed sexually transmitted diseases, partner selection, use of condoms, avoidance of unintended pregnancy, and contraceptive alternatives.  Exercise: the importance of regular exercise/physical activity was discussed. Recommend exercise 3-5 times per week for at least 30 minutes.       Depression Screening and Follow-up Plan: Patient was screened for depression during today's encounter. They screened negative with a PHQ-2 score of 0.        History of Present Illness     Adult Annual Physical:  Patient presents for annual physical.     Diet and Physical Activity:  - Diet/Nutrition: well balanced diet, limited junk food and low fat diet. limited fruits and vegetables  - Exercise: no formal exercise.    Depression Screening:  - PHQ-2 Score: 0    General Health:  - Sleep: sleeps poorly, snores loudly and unrefreshing sleep. 6-7 hours of sleep  - Hearing: normal hearing bilateral ears.  - Vision: no vision problems and most recent eye exam > 1 year ago.  - Dental: regular dental visits and brushes teeth twice daily.     Health:  - History of STDs: no.   - Urinary symptoms: none.     Review of Systems   Constitutional: Negative.  Negative for fatigue.   HENT: Negative.  Negative for congestion, postnasal drip, rhinorrhea and trouble swallowing.    Eyes: Negative.  Negative for visual disturbance.   Respiratory: Negative.  Negative for choking and shortness of breath.    Cardiovascular: Negative.  Negative for chest pain.   Gastrointestinal:  "Negative.    Endocrine: Negative.    Genitourinary: Negative.    Musculoskeletal: Negative.  Negative for arthralgias, back pain, myalgias and neck pain.   Skin: Negative.    Neurological:  Negative for dizziness and headaches.   Psychiatric/Behavioral:  Positive for sleep disturbance. The patient is nervous/anxious.          Objective     /84   Pulse 84   Temp 98.4 °F (36.9 °C) (Tympanic)   Resp 18   Ht 5' 8.5\" (1.74 m)   Wt 99.1 kg (218 lb 6.4 oz)   SpO2 98%   BMI 32.72 kg/m²     Physical Exam  Vitals and nursing note reviewed.   Constitutional:       Appearance: Normal appearance. He is well-developed. He is obese.   HENT:      Head: Normocephalic and atraumatic.      Right Ear: Tympanic membrane, ear canal and external ear normal. There is no impacted cerumen.      Left Ear: Tympanic membrane, ear canal and external ear normal. There is no impacted cerumen.      Nose: Nose normal.      Mouth/Throat:      Mouth: Mucous membranes are moist.      Pharynx: Oropharynx is clear.   Eyes:      Conjunctiva/sclera: Conjunctivae normal.   Cardiovascular:      Rate and Rhythm: Normal rate and regular rhythm.      Pulses: Normal pulses.      Heart sounds: Normal heart sounds. No murmur heard.  Pulmonary:      Effort: Pulmonary effort is normal. No respiratory distress.      Breath sounds: Normal breath sounds.   Abdominal:      General: Bowel sounds are normal. There is no distension.      Palpations: Abdomen is soft. There is no mass.      Tenderness: There is no abdominal tenderness. There is no guarding or rebound.      Hernia: No hernia is present.   Musculoskeletal:         General: Normal range of motion.      Cervical back: Normal range of motion and neck supple.   Skin:     General: Skin is warm and dry.   Neurological:      General: No focal deficit present.      Mental Status: He is alert and oriented to person, place, and time. Mental status is at baseline.   Psychiatric:         Mood and Affect: Mood " normal.         Behavior: Behavior normal.         Thought Content: Thought content normal.         Judgment: Judgment normal.

## 2024-08-14 DIAGNOSIS — J45.20 MILD INTERMITTENT ASTHMA WITHOUT COMPLICATION: ICD-10-CM

## 2024-08-16 RX ORDER — ALBUTEROL SULFATE 90 UG/1
AEROSOL, METERED RESPIRATORY (INHALATION)
Qty: 6.7 G | Refills: 2 | Status: SHIPPED | OUTPATIENT
Start: 2024-08-16

## 2024-09-05 DIAGNOSIS — I10 BENIGN ESSENTIAL HYPERTENSION: ICD-10-CM

## 2024-09-05 RX ORDER — BENAZEPRIL HYDROCHLORIDE 20 MG/1
TABLET ORAL
Qty: 90 TABLET | Refills: 1 | Status: SHIPPED | OUTPATIENT
Start: 2024-09-05

## 2024-09-05 RX ORDER — HYDROCHLOROTHIAZIDE 12.5 MG/1
TABLET ORAL
Qty: 90 TABLET | Refills: 1 | Status: SHIPPED | OUTPATIENT
Start: 2024-09-05

## 2024-12-29 DIAGNOSIS — J45.20 MILD INTERMITTENT ASTHMA WITHOUT COMPLICATION: ICD-10-CM

## 2024-12-30 RX ORDER — FLUTICASONE FUROATE AND VILANTEROL TRIFENATATE 200; 25 UG/1; UG/1
POWDER RESPIRATORY (INHALATION)
Qty: 60 EACH | Refills: 2 | Status: SHIPPED | OUTPATIENT
Start: 2024-12-30

## 2025-03-01 ENCOUNTER — HOSPITAL ENCOUNTER (EMERGENCY)
Facility: HOSPITAL | Age: 50
Discharge: HOME | End: 2025-03-01
Attending: EMERGENCY MEDICINE
Payer: COMMERCIAL

## 2025-03-01 VITALS
TEMPERATURE: 97.4 F | DIASTOLIC BLOOD PRESSURE: 78 MMHG | HEART RATE: 86 BPM | SYSTOLIC BLOOD PRESSURE: 138 MMHG | OXYGEN SATURATION: 93 % | RESPIRATION RATE: 18 BRPM

## 2025-03-01 DIAGNOSIS — J45.901 ASTHMA EXACERBATION: Primary | ICD-10-CM

## 2025-03-01 DIAGNOSIS — J45.901 EXACERBATION OF ASTHMA, UNSPECIFIED ASTHMA SEVERITY, UNSPECIFIED WHETHER PERSISTENT: ICD-10-CM

## 2025-03-01 DIAGNOSIS — J45.20 MILD INTERMITTENT ASTHMA WITHOUT COMPLICATION: ICD-10-CM

## 2025-03-01 PROCEDURE — 99284 EMERGENCY DEPT VISIT MOD MDM: CPT

## 2025-03-01 PROCEDURE — 94644 CONT INHLJ TX 1ST HOUR: CPT

## 2025-03-01 PROCEDURE — 94640 AIRWAY INHALATION TREATMENT: CPT

## 2025-03-01 PROCEDURE — 99284 EMERGENCY DEPT VISIT MOD MDM: CPT | Performed by: EMERGENCY MEDICINE

## 2025-03-01 RX ORDER — ALBUTEROL SULFATE 5 MG/ML
10 SOLUTION RESPIRATORY (INHALATION) ONCE
Status: COMPLETED | OUTPATIENT
Start: 2025-03-01 | End: 2025-03-01

## 2025-03-01 RX ORDER — SODIUM CHLORIDE FOR INHALATION 0.9 %
12 VIAL, NEBULIZER (ML) INHALATION ONCE
Status: COMPLETED | OUTPATIENT
Start: 2025-03-01 | End: 2025-03-01

## 2025-03-01 RX ORDER — ALBUTEROL SULFATE 0.83 MG/ML
2.5 SOLUTION RESPIRATORY (INHALATION) EVERY 6 HOURS PRN
Qty: 75 ML | Refills: 0 | Status: SHIPPED | OUTPATIENT
Start: 2025-03-01

## 2025-03-01 RX ORDER — PREDNISONE 20 MG/1
40 TABLET ORAL ONCE
Status: COMPLETED | OUTPATIENT
Start: 2025-03-01 | End: 2025-03-01

## 2025-03-01 RX ORDER — ALBUTEROL SULFATE 90 UG/1
INHALANT RESPIRATORY (INHALATION)
Qty: 6.7 G | Refills: 0 | Status: SHIPPED | OUTPATIENT
Start: 2025-03-01

## 2025-03-01 RX ORDER — PREDNISONE 20 MG/1
40 TABLET ORAL DAILY
Qty: 10 TABLET | Refills: 0 | Status: SHIPPED | OUTPATIENT
Start: 2025-03-01 | End: 2025-03-06

## 2025-03-01 RX ADMIN — PREDNISONE 40 MG: 20 TABLET ORAL at 14:45

## 2025-03-01 RX ADMIN — IPRATROPIUM BROMIDE 1 MG: 0.5 SOLUTION RESPIRATORY (INHALATION) at 14:45

## 2025-03-01 RX ADMIN — ALBUTEROL SULFATE 10 MG: 2.5 SOLUTION RESPIRATORY (INHALATION) at 14:45

## 2025-03-01 RX ADMIN — ISODIUM CHLORIDE 12 ML: 0.03 SOLUTION RESPIRATORY (INHALATION) at 14:45

## 2025-03-01 NOTE — ED ATTENDING ATTESTATION
Final Diagnoses:     1. Asthma exacerbation    2. Exacerbation of asthma, unspecified asthma severity, unspecified whether persistent    3. Mild intermittent asthma without complication           I, Wolfgang Wagner MD, saw and evaluated the patient. All available labs and X-rays were ordered by me or the resident / non-physician and have been reviewed by myself. I discussed the patient with the resident / non-physician and agree with the resident's / non-physician practitioner's findings and plan as documented in the resident's / non-physician practicitioner's note, except where noted.   At this point, I agree with the current assessment done in the ED.   I was present during key portions of all procedures performed unless otherwise stated.     HPI:  NURSING TRIAGE:    This is a 49 y.o. male presenting for evaluation of asthma exacerbation.  Patient has a well-established history of asthma, since Wednesday's been having increased cough, feeling chest tightness like typical asthma exacerbations.  Home medications not working.  No fevers chills nausea vomiting.   Chief Complaint   Patient presents with    Asthma     Asthma since yesterday      PHYSICAL: ASSESSMENT + PLAN:   Pertinent: Diffuse wheezing even without stethoscope.  No conversational dyspnea, saturating 95% with heart rate of 89 during examination.    General: VS reviewed  Appears in NAD  awake, alert.   Well-nourished, well-developed. Appears stated age.   Speaking normally in full sentences.   Head: Normocephalic, atraumatic  Eyes: EOM-I. No diplopia.   No hyphema.   No subconjunctival hemorrhages.  Symmetrical lids.   ENT: Atraumatic external nose and ears.    MMM  No malocclusion. No stridor. Normal phonation. No drooling. Normal swallowing.   Neck: No JVD.  CV: No pallor noted no tachycardia no diaphoresis  Lungs:   No tachypnea  No respiratory distress  Abd: soft nt nd no rebound/guarding  MSK:   FROM spontaneously  Skin: Dry, intact.   Neuro:  Awake, alert, GCS15, CN II-XII grossly intact.   Motor grossly intact.  Psychiatric/Behavioral: interacting normally; appropriate mood/affect.    Exam: deferred    Vitals:    03/01/25 1327 03/01/25 1445   BP: 134/73 138/78   BP Location:  Right arm   Pulse: (!) 111 86   Resp: 20 18   Temp: (!) 97.4 °F (36.3 °C)    SpO2: 94% 93%    Mild-moderate asthma exacerbation failing outpatient management.  Will do heart neb, steroids, reevaluate.  If symptomatically improved, likely discharge home follow-up primary care with course of steroids.     There are no obvious limitations to social determinants of care.   Nursing note reviewed.   Vitals reviewed.   Orders placed by myself and/or advanced practitioner / resident.    Previous chart was reviewed  History obtained from: Patient  Language barrier: None  Limitations to the history obtained: None Critical Care Time:      Past Medical: Past Surgical:    has a past medical history of 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NAKUL M3) (HCC), 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HCC), Allergic rhinitis, Allergic rhinitis (3/8/2014), Asthma, COVID-19 virus infection (12/11/2020), Essential hypertension, benign, Leukemia (HCC), Obesity, and Personal history of other diseases of respiratory system.  has a past surgical history that includes Portacath placement (Right); Bone marrow biopsy w/ aspiration (N/A); and [place] tooth extraction.   Social: Cardiac (Echo/Cath)   Social History     Substance and Sexual Activity   Alcohol Use No     Social History     Tobacco Use   Smoking Status Never    Passive exposure: Never   Smokeless Tobacco Never     Social History     Substance and Sexual Activity   Drug Use No    No results found for this or any previous visit.    No results found for this or any previous visit.    No results found for this or any previous visit.     Labs: Imaging:   Labs Reviewed - No data to display No orders to display      Medications: Code Status:   Medications   albuterol  inhalation solution 10 mg (10 mg Nebulization Given 3/1/25 1445)   ipratropium (ATROVENT) 0.02 % inhalation solution 1 mg (1 mg Nebulization Given 3/1/25 1445)   sodium chloride 0.9 % inhalation solution 12 mL (12 mL Nebulization Given 3/1/25 1445)   predniSONE tablet 40 mg (40 mg Oral Given 3/1/25 1445)    Code Status: No Order  Advance Directive and Living Will:      Power of :    POLST:     No orders of the defined types were placed in this encounter.    Time reflects when diagnosis was documented in both MDM as applicable and the Disposition within this note       Time User Action Codes Description Comment    3/1/2025  3:30 PM Aftab Cruz [J45.901] Asthma exacerbation     3/1/2025  3:30 PM Aftab Cruz [J45.901] Exacerbation of asthma, unspecified asthma severity, unspecified whether persistent     3/1/2025  3:30 PM Aftab Cruz [J45.20] Mild intermittent asthma without complication           ED Disposition       ED Disposition   Discharge    Condition   Stable    Date/Time   Sat Mar 1, 2025  3:30 PM    Comment   Franco Moreland discharge to home/self care.                   Follow-up Information       Follow up With Specialties Details Why Contact Info Additional Information    [facility] In 1 week  [address] [place] [id number]  [phone]       [facility] Department Emergency Medicine Go to  As needed, If symptoms worsen [address] [place] [id number] [phone] [facility] Department, [address] [place] [address], [id number] [phone]          Discharge Medication List as of 3/1/2025  4:44 PM        START taking these medications    Details   predniSONE 20 mg tablet Take 2 tablets (40 mg total) by mouth daily for 5 days, Starting Sat 3/1/2025, Until Thu 3/6/2025, Normal           CONTINUE these medications which have CHANGED    Details   albuterol (2.5  mg/3 mL) 0.083 % nebulizer solution Take 3 mL (2.5 mg total) by nebulization every 6 (six) hours as needed for wheezing or shortness of breath, Starting Sat 3/1/2025, Normal      albuterol (Ventolin HFA) 90 mcg/act inhaler Use 2 puffs every 4-6 hr. PRN for chest tightness or wheezing, Normal           CONTINUE these medications which have NOT CHANGED    Details   benazepril (LOTENSIN) 20 mg tablet TAKE 1 TABLET BY MOUTH EVERY DAY, Normal      Breo Ellipta 200-25 MCG/ACT inhaler INHALE 1 PUFF DAILY RINSE MOUTH AFTER USE., Normal      ergocalciferol (VITAMIN D2) 50,000 units TAKE 1 CAPSULE (50,000 UNITS TOTAL) BY MOUTH ONCE A WEEK FOR 12 DOSES, Starting Wed 6/12/2024, Until Thu 8/29/2024, Normal      hydroCHLOROthiazide 12.5 mg tablet TAKE 1 TABLET BY MOUTH EVERY DAY, Normal      multivitamin (THERAGRAN) TABS Take 1 tablet by mouth daily, Historical Med      sertraline (ZOLOFT) 50 mg tablet Take 1 tablet (50 mg total) by mouth daily, Starting Tue 8/13/2024, Until Sun 2/9/2025, Normal           No discharge procedures on file.  Prior to Admission Medications   Prescriptions Last Dose Informant Patient Reported? Taking?   Breo Ellipta 200-25 MCG/ACT inhaler   No No   Sig: INHALE 1 PUFF DAILY RINSE MOUTH AFTER USE.   albuterol (2.5 mg/3 mL) 0.083 % nebulizer solution   No No   Sig: Take 3 mL (2.5 mg total) by nebulization every 6 (six) hours as needed for wheezing or shortness of breath   albuterol (2.5 mg/3 mL) 0.083 % nebulizer solution   No Yes   Sig: Take 3 mL (2.5 mg total) by nebulization every 6 (six) hours as needed for wheezing or shortness of breath   albuterol (Ventolin HFA) 90 mcg/act inhaler   No No   Sig: Use 2 puffs every 4-6 hr. PRN for chest tightness or wheezing   albuterol (Ventolin HFA) 90 mcg/act inhaler   No Yes   Sig: Use 2 puffs every 4-6 hr. PRN for chest tightness or wheezing   benazepril (LOTENSIN) 20 mg tablet   No No   Sig: TAKE 1 TABLET BY MOUTH EVERY DAY   ergocalciferol (VITAMIN D2) 50,000  "units   No No   Sig: TAKE 1 CAPSULE (50,000 UNITS TOTAL) BY MOUTH ONCE A WEEK FOR 12 DOSES   hydroCHLOROthiazide 12.5 mg tablet   No No   Sig: TAKE 1 TABLET BY MOUTH EVERY DAY   multivitamin (THERAGRAN) TABS  Self Yes No   Sig: Take 1 tablet by mouth daily   sertraline (ZOLOFT) 50 mg tablet   No No   Sig: Take 1 tablet (50 mg total) by mouth daily      Facility-Administered Medications: None                        Portions of the record may have been created with voice recognition software. Occasional wrong word or \"sound a like\" substitutions may have occurred due to the inherent limitations of voice recognition software. Read the chart carefully and recognize, using context, where substitutions have occurred.    Electronically signed by:  Wolfgang Wagner  "

## 2025-03-01 NOTE — ED PROVIDER NOTES
Time reflects when diagnosis was documented in both MDM as applicable and the Disposition within this note       Time User Action Codes Description Comment    3/1/2025  3:30 PM Aftab Cruz [J45.901] Asthma exacerbation     3/1/2025  3:30 PM Aftab Cruz [J45.901] Exacerbation of asthma, unspecified asthma severity, unspecified whether persistent     3/1/2025  3:30 PM Aftab Cruz [J45.20] Mild intermittent asthma without complication           ED Disposition       ED Disposition   Discharge    Condition   Stable    Date/Time   Sat Mar 1, 2025  3:30 PM    Comment   Franco Waggonerett discharge to home/self care.                   Assessment & Plan       Medical Decision Making  Patient is a 49-year-old male presenting for evaluation of symptoms consistent with asthma exacerbation.  Not hypoxic or tachypneic, will give nebulized albuterol ipratropium as well as prednisone and reassess.  No need for labs or imaging at this time    On reauscultation wheezing is improved, patient feels improved after nebulizers.  Will send prescription for prednisone to the pharmacy.  Patient is hemodynamically stable and cleared for discharge outpatient follow-up.  Return precautions given patient verbalized understanding    Risk  Prescription drug management.             Medications   albuterol inhalation solution 10 mg (10 mg Nebulization Given 3/1/25 1445)   ipratropium (ATROVENT) 0.02 % inhalation solution 1 mg (1 mg Nebulization Given 3/1/25 1445)   sodium chloride 0.9 % inhalation solution 12 mL (12 mL Nebulization Given 3/1/25 1445)   predniSONE tablet 40 mg (40 mg Oral Given 3/1/25 1445)       ED Risk Strat Scores                                                History of Present Illness       Chief Complaint   Patient presents with    Asthma     Asthma since yesterday       Past Medical History:   Diagnosis Date    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NAKUL M3) (HCC)     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HCC)      Allergic rhinitis     Allergic rhinitis 3/8/2014    Asthma     COVID-19 virus infection 12/11/2020    Essential hypertension, benign     Leukemia (HCC)     Obesity     Personal history of other diseases of respiratory system       Past Surgical History:   Procedure Laterality Date    BONE MARROW BIOPSY W/ ASPIRATION N/A     PORTACATH PLACEMENT Right     WISDOM TOOTH EXTRACTION        Family History   Problem Relation Age of Onset    Hypertension Father       Social History     Tobacco Use    Smoking status: Never     Passive exposure: Never    Smokeless tobacco: Never   Vaping Use    Vaping status: Never Used   Substance Use Topics    Alcohol use: No    Drug use: No      E-Cigarette/Vaping    E-Cigarette Use Never User       E-Cigarette/Vaping Substances      I have reviewed and agree with the history as documented.     Patient is a 49-year-old male past medical history of asthma presenting to the ED for evaluation of shortness of breath cough and wheezing.  Reports that symptoms and ongoing for several days took his medications at home without relief to symptoms.  He is denying any other complaints at this time          Review of Systems   Constitutional:  Negative for chills and fever.   HENT:  Negative for ear pain and sore throat.    Eyes:  Negative for pain and visual disturbance.   Respiratory:  Positive for cough, shortness of breath and wheezing.    Cardiovascular:  Negative for chest pain, palpitations and leg swelling.   Gastrointestinal:  Negative for abdominal pain, nausea and vomiting.   Genitourinary:  Negative for dysuria and hematuria.   Musculoskeletal:  Negative for arthralgias and back pain.   Skin:  Negative for color change and rash.   Neurological:  Negative for seizures and syncope.   All other systems reviewed and are negative.          Objective       ED Triage Vitals   Temperature Pulse Blood Pressure Respirations SpO2 Patient Position - Orthostatic VS   03/01/25 1327 03/01/25 1327 03/01/25  1327 03/01/25 1327 03/01/25 1327 03/01/25 1445   (!) 97.4 °F (36.3 °C) (!) 111 134/73 20 94 % Lying      Temp src Heart Rate Source BP Location FiO2 (%) Pain Score    -- 03/01/25 1445 03/01/25 1445 -- 03/01/25 1327     Monitor Right arm  No Pain      Vitals      Date and Time Temp Pulse SpO2 Resp BP Pain Score FACES Pain Rating User   03/01/25 1445 -- 86 93 % 18 138/78 -- -- ST   03/01/25 1352 -- -- -- -- -- No Pain -- ST   03/01/25 1327 97.4 °F (36.3 °C) 111 94 % 20 134/73 No Pain -- AK            Physical Exam  Vitals and nursing note reviewed.   Constitutional:       General: He is not in acute distress.     Appearance: He is well-developed. He is not ill-appearing.   HENT:      Head: Normocephalic and atraumatic.      Mouth/Throat:      Mouth: Mucous membranes are moist.   Eyes:      Extraocular Movements: Extraocular movements intact.      Conjunctiva/sclera: Conjunctivae normal.   Cardiovascular:      Rate and Rhythm: Normal rate and regular rhythm.      Heart sounds: No murmur heard.  Pulmonary:      Effort: Pulmonary effort is normal. No respiratory distress.      Breath sounds: Wheezing present.   Abdominal:      Palpations: Abdomen is soft.      Tenderness: There is no abdominal tenderness.   Musculoskeletal:         General: Normal range of motion.      Cervical back: Normal range of motion and neck supple.      Right lower leg: No edema.      Left lower leg: No edema.   Skin:     General: Skin is warm and dry.      Capillary Refill: Capillary refill takes less than 2 seconds.   Neurological:      General: No focal deficit present.      Mental Status: He is alert.         Results Reviewed       None            No orders to display       Procedures    ED Medication and Procedure Management   Prior to Admission Medications   Prescriptions Last Dose Informant Patient Reported? Taking?   Breo Ellipta 200-25 MCG/ACT inhaler   No No   Sig: INHALE 1 PUFF DAILY RINSE MOUTH AFTER USE.   albuterol (2.5 mg/3 mL)  0.083 % nebulizer solution   No No   Sig: Take 3 mL (2.5 mg total) by nebulization every 6 (six) hours as needed for wheezing or shortness of breath   albuterol (2.5 mg/3 mL) 0.083 % nebulizer solution   No Yes   Sig: Take 3 mL (2.5 mg total) by nebulization every 6 (six) hours as needed for wheezing or shortness of breath   albuterol (Ventolin HFA) 90 mcg/act inhaler   No No   Sig: Use 2 puffs every 4-6 hr. PRN for chest tightness or wheezing   albuterol (Ventolin HFA) 90 mcg/act inhaler   No Yes   Sig: Use 2 puffs every 4-6 hr. PRN for chest tightness or wheezing   benazepril (LOTENSIN) 20 mg tablet   No No   Sig: TAKE 1 TABLET BY MOUTH EVERY DAY   ergocalciferol (VITAMIN D2) 50,000 units   No No   Sig: TAKE 1 CAPSULE (50,000 UNITS TOTAL) BY MOUTH ONCE A WEEK FOR 12 DOSES   hydroCHLOROthiazide 12.5 mg tablet   No No   Sig: TAKE 1 TABLET BY MOUTH EVERY DAY   multivitamin (THERAGRAN) TABS  Self Yes No   Sig: Take 1 tablet by mouth daily   sertraline (ZOLOFT) 50 mg tablet   No No   Sig: Take 1 tablet (50 mg total) by mouth daily      Facility-Administered Medications: None     Discharge Medication List as of 3/1/2025  4:44 PM        START taking these medications    Details   predniSONE 20 mg tablet Take 2 tablets (40 mg total) by mouth daily for 5 days, Starting Sat 3/1/2025, Until Thu 3/6/2025, Normal           CONTINUE these medications which have CHANGED    Details   albuterol (2.5 mg/3 mL) 0.083 % nebulizer solution Take 3 mL (2.5 mg total) by nebulization every 6 (six) hours as needed for wheezing or shortness of breath, Starting Sat 3/1/2025, Normal      albuterol (Ventolin HFA) 90 mcg/act inhaler Use 2 puffs every 4-6 hr. PRN for chest tightness or wheezing, Normal           CONTINUE these medications which have NOT CHANGED    Details   benazepril (LOTENSIN) 20 mg tablet TAKE 1 TABLET BY MOUTH EVERY DAY, Normal      Breo Ellipta 200-25 MCG/ACT inhaler INHALE 1 PUFF DAILY RINSE MOUTH AFTER USE., Normal       ergocalciferol (VITAMIN D2) 50,000 units TAKE 1 CAPSULE (50,000 UNITS TOTAL) BY MOUTH ONCE A WEEK FOR 12 DOSES, Starting Wed 6/12/2024, Until Thu 8/29/2024, Normal      hydroCHLOROthiazide 12.5 mg tablet TAKE 1 TABLET BY MOUTH EVERY DAY, Normal      multivitamin (THERAGRAN) TABS Take 1 tablet by mouth daily, Historical Med      sertraline (ZOLOFT) 50 mg tablet Take 1 tablet (50 mg total) by mouth daily, Starting Tue 8/13/2024, Until Sun 2/9/2025, Normal           No discharge procedures on file.  ED SEPSIS DOCUMENTATION   Time reflects when diagnosis was documented in both MDM as applicable and the Disposition within this note       Time User Action Codes Description Comment    3/1/2025  3:30 PM Aftab Cruz [J45.901] Asthma exacerbation     3/1/2025  3:30 PM Aftab Cruz [J45.901] Exacerbation of asthma, unspecified asthma severity, unspecified whether persistent     3/1/2025  3:30 PM Aftab Cruz [J45.20] Mild intermittent asthma without complication                  Aftab Cruz DO  03/06/25 [id number]

## 2025-03-15 DIAGNOSIS — I10 BENIGN ESSENTIAL HYPERTENSION: ICD-10-CM

## 2025-03-17 RX ORDER — BENAZEPRIL HYDROCHLORIDE 20 MG/1
20 TABLET ORAL DAILY
Qty: 30 TABLET | Refills: 0 | Status: SHIPPED | OUTPATIENT
Start: 2025-03-17

## 2025-03-17 RX ORDER — HYDROCHLOROTHIAZIDE 12.5 MG/1
12.5 TABLET ORAL DAILY
Qty: 30 TABLET | Refills: 0 | Status: SHIPPED | OUTPATIENT
Start: 2025-03-17

## 2025-04-14 DIAGNOSIS — I10 BENIGN ESSENTIAL HYPERTENSION: ICD-10-CM

## 2025-04-16 DIAGNOSIS — E78.5 DYSLIPIDEMIA: ICD-10-CM

## 2025-04-16 DIAGNOSIS — I10 BENIGN ESSENTIAL HYPERTENSION: Primary | ICD-10-CM

## 2025-04-16 RX ORDER — HYDROCHLOROTHIAZIDE 12.5 MG/1
12.5 TABLET ORAL DAILY
Qty: 90 TABLET | Refills: 0 | Status: SHIPPED | OUTPATIENT
Start: 2025-04-16

## 2025-04-16 RX ORDER — BENAZEPRIL HYDROCHLORIDE 20 MG/1
20 TABLET ORAL DAILY
Qty: 90 TABLET | Refills: 0 | Status: SHIPPED | OUTPATIENT
Start: 2025-04-16

## 2025-07-19 DIAGNOSIS — I10 BENIGN ESSENTIAL HYPERTENSION: ICD-10-CM

## 2025-07-21 RX ORDER — BENAZEPRIL HYDROCHLORIDE 20 MG/1
20 TABLET ORAL DAILY
Qty: 90 TABLET | Refills: 0 | Status: SHIPPED | OUTPATIENT
Start: 2025-07-21

## 2025-07-21 RX ORDER — HYDROCHLOROTHIAZIDE 12.5 MG/1
12.5 TABLET ORAL DAILY
Qty: 90 TABLET | Refills: 0 | Status: SHIPPED | OUTPATIENT
Start: 2025-07-21